# Patient Record
Sex: MALE | Race: ASIAN | NOT HISPANIC OR LATINO | URBAN - METROPOLITAN AREA
[De-identification: names, ages, dates, MRNs, and addresses within clinical notes are randomized per-mention and may not be internally consistent; named-entity substitution may affect disease eponyms.]

---

## 2018-03-13 ENCOUNTER — EMERGENCY (EMERGENCY)
Facility: HOSPITAL | Age: 38
LOS: 1 days | Discharge: HOME | End: 2018-03-13
Attending: EMERGENCY MEDICINE | Admitting: EMERGENCY MEDICINE
Payer: MEDICAID

## 2018-03-13 VITALS
SYSTOLIC BLOOD PRESSURE: 115 MMHG | DIASTOLIC BLOOD PRESSURE: 80 MMHG | RESPIRATION RATE: 18 BRPM | OXYGEN SATURATION: 100 % | HEART RATE: 64 BPM

## 2018-03-13 VITALS
DIASTOLIC BLOOD PRESSURE: 80 MMHG | HEART RATE: 68 BPM | TEMPERATURE: 99 F | SYSTOLIC BLOOD PRESSURE: 112 MMHG | OXYGEN SATURATION: 99 % | RESPIRATION RATE: 18 BRPM

## 2018-03-13 LAB
ALBUMIN SERPL ELPH-MCNC: 4.5 G/DL — SIGNIFICANT CHANGE UP (ref 3.3–5)
ALP SERPL-CCNC: 72 U/L — SIGNIFICANT CHANGE UP (ref 40–120)
ALT FLD-CCNC: 64 U/L — HIGH (ref 4–41)
AST SERPL-CCNC: 80 U/L — HIGH (ref 4–40)
BASOPHILS # BLD AUTO: 0.02 K/UL — SIGNIFICANT CHANGE UP (ref 0–0.2)
BASOPHILS NFR BLD AUTO: 0.8 % — SIGNIFICANT CHANGE UP (ref 0–2)
BASOPHILS NFR SPEC: 0 % — SIGNIFICANT CHANGE UP (ref 0–2)
BILIRUB SERPL-MCNC: 0.3 MG/DL — SIGNIFICANT CHANGE UP (ref 0.2–1.2)
BUN SERPL-MCNC: 8 MG/DL — SIGNIFICANT CHANGE UP (ref 7–23)
CALCIUM SERPL-MCNC: 8.8 MG/DL — SIGNIFICANT CHANGE UP (ref 8.4–10.5)
CARBAMAZEPINE SERPL-MCNC: 7.3 UG/ML — SIGNIFICANT CHANGE UP (ref 4–12)
CHLORIDE SERPL-SCNC: 100 MMOL/L — SIGNIFICANT CHANGE UP (ref 98–107)
CO2 SERPL-SCNC: 22 MMOL/L — SIGNIFICANT CHANGE UP (ref 22–31)
CREAT SERPL-MCNC: 0.88 MG/DL — SIGNIFICANT CHANGE UP (ref 0.5–1.3)
EOSINOPHIL # BLD AUTO: 0.07 K/UL — SIGNIFICANT CHANGE UP (ref 0–0.5)
EOSINOPHIL NFR BLD AUTO: 2.8 % — SIGNIFICANT CHANGE UP (ref 0–6)
EOSINOPHIL NFR FLD: 2.9 % — SIGNIFICANT CHANGE UP (ref 0–6)
GIANT PLATELETS BLD QL SMEAR: PRESENT — SIGNIFICANT CHANGE UP
GLUCOSE SERPL-MCNC: 89 MG/DL — SIGNIFICANT CHANGE UP (ref 70–99)
HCT VFR BLD CALC: 43.5 % — SIGNIFICANT CHANGE UP (ref 39–50)
HGB BLD-MCNC: 14.8 G/DL — SIGNIFICANT CHANGE UP (ref 13–17)
IMM GRANULOCYTES # BLD AUTO: 0.01 # — SIGNIFICANT CHANGE UP
IMM GRANULOCYTES NFR BLD AUTO: 0.4 % — SIGNIFICANT CHANGE UP (ref 0–1.5)
LYMPHOCYTES # BLD AUTO: 1.38 K/UL — SIGNIFICANT CHANGE UP (ref 1–3.3)
LYMPHOCYTES # BLD AUTO: 55.4 % — HIGH (ref 13–44)
LYMPHOCYTES NFR SPEC AUTO: 41 % — SIGNIFICANT CHANGE UP (ref 13–44)
MACROCYTES BLD QL: SIGNIFICANT CHANGE UP
MCHC RBC-ENTMCNC: 30.8 PG — SIGNIFICANT CHANGE UP (ref 27–34)
MCHC RBC-ENTMCNC: 34 % — SIGNIFICANT CHANGE UP (ref 32–36)
MCV RBC AUTO: 90.6 FL — SIGNIFICANT CHANGE UP (ref 80–100)
MICROCYTES BLD QL: SLIGHT — SIGNIFICANT CHANGE UP
MONOCYTES # BLD AUTO: 0.16 K/UL — SIGNIFICANT CHANGE UP (ref 0–0.9)
MONOCYTES NFR BLD AUTO: 6.4 % — SIGNIFICANT CHANGE UP (ref 2–14)
MONOCYTES NFR BLD: 4.8 % — SIGNIFICANT CHANGE UP (ref 2–9)
MYELOCYTES NFR BLD: 0.9 % — HIGH (ref 0–0)
NEUTROPHIL AB SER-ACNC: 29.5 % — LOW (ref 43–77)
NEUTROPHILS # BLD AUTO: 0.85 K/UL — LOW (ref 1.8–7.4)
NEUTROPHILS NFR BLD AUTO: 34.2 % — LOW (ref 43–77)
NRBC # FLD: 0 — SIGNIFICANT CHANGE UP
OVALOCYTES BLD QL SMEAR: SLIGHT — SIGNIFICANT CHANGE UP
PLATELET # BLD AUTO: 161 K/UL — SIGNIFICANT CHANGE UP (ref 150–400)
PLATELET COUNT - ESTIMATE: NORMAL — SIGNIFICANT CHANGE UP
PMV BLD: 11 FL — SIGNIFICANT CHANGE UP (ref 7–13)
POTASSIUM SERPL-MCNC: 3.8 MMOL/L — SIGNIFICANT CHANGE UP (ref 3.5–5.3)
POTASSIUM SERPL-SCNC: 3.8 MMOL/L — SIGNIFICANT CHANGE UP (ref 3.5–5.3)
PROT SERPL-MCNC: 8.6 G/DL — HIGH (ref 6–8.3)
RBC # BLD: 4.8 M/UL — SIGNIFICANT CHANGE UP (ref 4.2–5.8)
RBC # FLD: 11.7 % — SIGNIFICANT CHANGE UP (ref 10.3–14.5)
SODIUM SERPL-SCNC: 137 MMOL/L — SIGNIFICANT CHANGE UP (ref 135–145)
VARIANT LYMPHS # BLD: 20.9 % — SIGNIFICANT CHANGE UP
WBC # BLD: 2.49 K/UL — LOW (ref 3.8–10.5)
WBC # FLD AUTO: 2.49 K/UL — LOW (ref 3.8–10.5)

## 2018-03-13 PROCEDURE — 99285 EMERGENCY DEPT VISIT HI MDM: CPT

## 2018-03-13 PROCEDURE — 71046 X-RAY EXAM CHEST 2 VIEWS: CPT | Mod: 26

## 2018-03-13 RX ORDER — SODIUM CHLORIDE 9 MG/ML
1000 INJECTION INTRAMUSCULAR; INTRAVENOUS; SUBCUTANEOUS ONCE
Qty: 0 | Refills: 0 | Status: COMPLETED | OUTPATIENT
Start: 2018-03-13 | End: 2018-03-13

## 2018-03-13 RX ADMIN — SODIUM CHLORIDE 1000 MILLILITER(S): 9 INJECTION INTRAMUSCULAR; INTRAVENOUS; SUBCUTANEOUS at 14:04

## 2018-03-13 NOTE — PROVIDER CONTACT NOTE (OTHER) - ASSESSMENT
pt stable to return home.  [facility] called for transportation authorization.  auth#[id number].  mother at home to open the door.

## 2018-03-13 NOTE — ED PROVIDER NOTE - MEDICAL DECISION MAKING DETAILS
38 y/o M with history of seizures presents with 2 x seizure this AM with improving mental status. Awake and alert. Labs, monitor for further episodes, IVF, reassess 38 y/o M with history of seizures presents with 2 x seizure this AM with improving mental status. Awake and alert. neuro exam at baseline. Patient compliant with medications and seizure was typical of baseline seizures which he has every few months. Likely will not require admission. Labs, monitor for further episodes, IVF, reassess

## 2018-03-13 NOTE — ED PROVIDER NOTE - PROGRESS NOTE DETAILS
Franciscoh: Patient alert and awake. Following commands. Conversive. Back at baseline mental status. To be discharged. Jaki: Patient alert and awake. Following commands. Conversive. Back at baseline mental status. Mother now at home and requesting ambulance to take patient home. Advised to stop taking levaquin. To be discharged.

## 2018-03-13 NOTE — ED PROVIDER NOTE - ATTENDING CONTRIBUTION TO CARE
DR. GARCIA, ATTENDING MD-  I performed a face to face bedside interview with patient regarding history of present illness, review of symptoms and past medical history. I completed an independent physical exam.  I have discussed patient's plan of care with the resident.   Documentation as above in the note.    38 y/o male with h/o tbi, epilepsy on topiramate, carbamazepine bib mother after seizures x2 today.  Confused from baseline on waking.  Seizures broke without intervention per mother.  Recently started on flouroquinolone for cough by pcp this week.  Typically seizures q3 months.  Afebrile, vs wnl, nad, alert and oriented to person only, poor insp effort but otherwise ctabil, s1s2 rrr no m/r/g, abd soft non ttp no r/g, no cva tenderness b/l, no leg swelling b/l, no rash, right side of body chronically contracted.  Per mother, rapidly improving ms, not quite at baseline.  Seizure may be secondary to apparent recent resp infection vs new abx.  Obtain cbc, bmp, cxr, continue to monitor for return to baseline, antic dc with pcp f/u.

## 2018-03-13 NOTE — ED PROVIDER NOTE - PHYSICAL EXAMINATION
Neuro: Patient has paralysis of RUE and RLE from previous TBI. 5/5 strength on L side and following commands.
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
## 2018-03-13 NOTE — ED PROVIDER NOTE - OBJECTIVE STATEMENT
38 y/o M with history of TBI, seizures on topiramate 200 mg BID and carbamazepine 200 mg BID BIBEMS for seizures x 2 with last episode one hour ago. Patient's mental status improved but not completely back at baseline. Seizures were typical of previous episodes. Last seizure 5 months ago and patient usually has a seizure every 3-4 months. Mother reports compliance with medications. Had recent URI symptoms and was started on levaquin by his PMD which he has been taking.   History obtained using Kazakh  #[id number] with mother.  PMD Dr. Marito Ramos

## 2018-03-13 NOTE — ED ADULT NURSE NOTE - OBJECTIVE STATEMENT
Patient received to [place], mother at bedside, patient responsive to name, as per mom patient's mental status improved but not back to baseline, baseline bilateral extremity weakness ambulates with cane. Patient experienced seizures x 2 today. Compliant with medications topiramate and carbamazepine. Currently being treated for + flu as per mom. Hx TBI, seizures. Last fall 1 month ago, no head injuries or LOC. Left hand 22g IV placed. Patient received to Tr-KRISTY, mother at bedside, patient responsive to name, as per mom patient's mental status improved but not back to baseline, baseline bilateral extremity weakness ambulates with cane, skin intact. Patient experienced seizures x 2 today. Compliant with medications topiramate and carbamazepine. Currently being treated for + flu as per mom. Hx TBI, seizures. Last fall 1 month ago, no head injuries or LOC. Left hand 22g IV placed.

## 2018-03-15 LAB — TOPIRAMATE SERPL-MCNC: 11 MCG/ML — SIGNIFICANT CHANGE UP

## 2018-04-20 ENCOUNTER — EMERGENCY (EMERGENCY)
Facility: HOSPITAL | Age: 38
LOS: 1 days | Discharge: HOME | End: 2018-04-20
Attending: EMERGENCY MEDICINE | Admitting: EMERGENCY MEDICINE
Payer: MEDICAID

## 2018-04-20 VITALS
DIASTOLIC BLOOD PRESSURE: 79 MMHG | SYSTOLIC BLOOD PRESSURE: 109 MMHG | RESPIRATION RATE: 16 BRPM | OXYGEN SATURATION: 100 % | TEMPERATURE: 98 F | HEART RATE: 73 BPM

## 2018-04-20 VITALS
SYSTOLIC BLOOD PRESSURE: 118 MMHG | HEART RATE: 64 BPM | RESPIRATION RATE: 18 BRPM | DIASTOLIC BLOOD PRESSURE: 81 MMHG | OXYGEN SATURATION: 99 %

## 2018-04-20 LAB
ALBUMIN SERPL ELPH-MCNC: 4.3 G/DL — SIGNIFICANT CHANGE UP (ref 3.3–5)
ALP SERPL-CCNC: 68 U/L — SIGNIFICANT CHANGE UP (ref 40–120)
ALT FLD-CCNC: 36 U/L — SIGNIFICANT CHANGE UP (ref 4–41)
APPEARANCE UR: SIGNIFICANT CHANGE UP
AST SERPL-CCNC: 39 U/L — SIGNIFICANT CHANGE UP (ref 4–40)
BASOPHILS # BLD AUTO: 0.03 K/UL — SIGNIFICANT CHANGE UP (ref 0–0.2)
BASOPHILS NFR BLD AUTO: 0.7 % — SIGNIFICANT CHANGE UP (ref 0–2)
BILIRUB SERPL-MCNC: 0.2 MG/DL — SIGNIFICANT CHANGE UP (ref 0.2–1.2)
BILIRUB UR-MCNC: NEGATIVE — SIGNIFICANT CHANGE UP
BLOOD UR QL VISUAL: NEGATIVE — SIGNIFICANT CHANGE UP
BUN SERPL-MCNC: 8 MG/DL — SIGNIFICANT CHANGE UP (ref 7–23)
CALCIUM SERPL-MCNC: 8.7 MG/DL — SIGNIFICANT CHANGE UP (ref 8.4–10.5)
CHLORIDE SERPL-SCNC: 98 MMOL/L — SIGNIFICANT CHANGE UP (ref 98–107)
CK MB BLD-MCNC: 14.11 NG/ML — HIGH (ref 1–6.6)
CK MB BLD-MCNC: 7.5 — HIGH (ref 0–2.5)
CK SERPL-CCNC: 187 U/L — SIGNIFICANT CHANGE UP (ref 30–200)
CO2 SERPL-SCNC: 22 MMOL/L — SIGNIFICANT CHANGE UP (ref 22–31)
COLOR SPEC: SIGNIFICANT CHANGE UP
CREAT SERPL-MCNC: 0.76 MG/DL — SIGNIFICANT CHANGE UP (ref 0.5–1.3)
EOSINOPHIL # BLD AUTO: 0.05 K/UL — SIGNIFICANT CHANGE UP (ref 0–0.5)
EOSINOPHIL NFR BLD AUTO: 1.2 % — SIGNIFICANT CHANGE UP (ref 0–6)
GLUCOSE SERPL-MCNC: 103 MG/DL — HIGH (ref 70–99)
GLUCOSE UR-MCNC: NEGATIVE — SIGNIFICANT CHANGE UP
HCT VFR BLD CALC: 37.5 % — LOW (ref 39–50)
HGB BLD-MCNC: 12.9 G/DL — LOW (ref 13–17)
IMM GRANULOCYTES # BLD AUTO: 0.01 # — SIGNIFICANT CHANGE UP
IMM GRANULOCYTES NFR BLD AUTO: 0.2 % — SIGNIFICANT CHANGE UP (ref 0–1.5)
KETONES UR-MCNC: NEGATIVE — SIGNIFICANT CHANGE UP
LEUKOCYTE ESTERASE UR-ACNC: NEGATIVE — SIGNIFICANT CHANGE UP
LYMPHOCYTES # BLD AUTO: 2.04 K/UL — SIGNIFICANT CHANGE UP (ref 1–3.3)
LYMPHOCYTES # BLD AUTO: 48.5 % — HIGH (ref 13–44)
MCHC RBC-ENTMCNC: 31.9 PG — SIGNIFICANT CHANGE UP (ref 27–34)
MCHC RBC-ENTMCNC: 34.4 % — SIGNIFICANT CHANGE UP (ref 32–36)
MCV RBC AUTO: 92.6 FL — SIGNIFICANT CHANGE UP (ref 80–100)
MONOCYTES # BLD AUTO: 0.28 K/UL — SIGNIFICANT CHANGE UP (ref 0–0.9)
MONOCYTES NFR BLD AUTO: 6.7 % — SIGNIFICANT CHANGE UP (ref 2–14)
MUCOUS THREADS # UR AUTO: SIGNIFICANT CHANGE UP
NEUTROPHILS # BLD AUTO: 1.8 K/UL — SIGNIFICANT CHANGE UP (ref 1.8–7.4)
NEUTROPHILS NFR BLD AUTO: 42.7 % — LOW (ref 43–77)
NITRITE UR-MCNC: NEGATIVE — SIGNIFICANT CHANGE UP
NRBC # FLD: 0 — SIGNIFICANT CHANGE UP
PH UR: 7.5 — SIGNIFICANT CHANGE UP (ref 4.6–8)
PLATELET # BLD AUTO: 202 K/UL — SIGNIFICANT CHANGE UP (ref 150–400)
PMV BLD: 11.1 FL — SIGNIFICANT CHANGE UP (ref 7–13)
POTASSIUM SERPL-MCNC: 3.5 MMOL/L — SIGNIFICANT CHANGE UP (ref 3.5–5.3)
POTASSIUM SERPL-SCNC: 3.5 MMOL/L — SIGNIFICANT CHANGE UP (ref 3.5–5.3)
PROT SERPL-MCNC: 7.7 G/DL — SIGNIFICANT CHANGE UP (ref 6–8.3)
PROT UR-MCNC: NEGATIVE MG/DL — SIGNIFICANT CHANGE UP
RBC # BLD: 4.05 M/UL — LOW (ref 4.2–5.8)
RBC # FLD: 11.8 % — SIGNIFICANT CHANGE UP (ref 10.3–14.5)
SODIUM SERPL-SCNC: 134 MMOL/L — LOW (ref 135–145)
SP GR SPEC: 1.01 — SIGNIFICANT CHANGE UP (ref 1–1.04)
TROPONIN T SERPL-MCNC: < 0.06 NG/ML — SIGNIFICANT CHANGE UP (ref 0–0.06)
TROPONIN T SERPL-MCNC: < 0.06 NG/ML — SIGNIFICANT CHANGE UP (ref 0–0.06)
UROBILINOGEN FLD QL: NORMAL MG/DL — SIGNIFICANT CHANGE UP
WBC # BLD: 4.21 K/UL — SIGNIFICANT CHANGE UP (ref 3.8–10.5)
WBC # FLD AUTO: 4.21 K/UL — SIGNIFICANT CHANGE UP (ref 3.8–10.5)
WBC UR QL: SIGNIFICANT CHANGE UP (ref 0–?)

## 2018-04-20 PROCEDURE — 99285 EMERGENCY DEPT VISIT HI MDM: CPT

## 2018-04-20 PROCEDURE — 71045 X-RAY EXAM CHEST 1 VIEW: CPT | Mod: 26

## 2018-04-20 NOTE — ED ADULT NURSE REASSESSMENT NOTE - NS ED NURSE REASSESS COMMENT FT1
ER pt coming by EMS from home for left side CP with Hx. stroke with right side hemiparesis, skin intact, lungs clear, 98-99% RA on CM NSR resp. equal 18-20b/min  IV to left AC 20g angio cath. pt uses urinal UA sent. lab sent pending dispo.      Connie Seymour RN

## 2018-04-20 NOTE — ED PROVIDER NOTE - PLAN OF CARE
Take medications as prescribed. Follow up with your healthcare provider about this issue (these issues): chest pain. May benefit from stress test. Return if chest pain returns, especially if associated with fever, dizziness, sweating, nausea, vomiting, or shortness of breath.

## 2018-04-20 NOTE — ED PROVIDER NOTE - ATTENDING CONTRIBUTION TO CARE
I performed a face-to-face evaluation of the patient and performed a history and physical examination. I agree with the history and physical examination.    Isabell: 37 M, h/o TBI and sz., p/w CP and L arm pain. No infectious Sx. No VTE risks (walks w/ cane). Appears well. NAD. Afebrile. Vital signs unremarkable. Strong pulse. Respirations unlabored. No LE edema. HEART score low. PERC neg. Check EKG, trop, CXR. Likely Discharge home.

## 2018-04-20 NOTE — ED ADULT TRIAGE NOTE - CHIEF COMPLAINT QUOTE
Pt BIBA primarily Estonian speaking c/o Lt sided chest pain radiating to Lt arm starting yesterday, describes pain as squeezing, denies sob breathing even and unlabored. PMH: TBI(rt arm contracted), HTN Pt BIBA primarily Italian speaking coming from home, c/o Lt sided chest pain radiating to Lt arm starting yesterday, describes pain as squeezing, denies sob breathing even and unlabored. PMH: TBI(rt arm contracted), HTN

## 2018-04-20 NOTE — ED PROVIDER NOTE - OBJECTIVE STATEMENT
Chace  [id number]: 37 M h/o seizures, p/w L sided chest pain to L arm, Chace  [id number]: 37 M h/o seizures, TBI with contractures, p/w L sided chest pain to L arm, that started last night while sleeping. No associated SOB/diaphoresis/nausea/vomiting. No pleuritic pain. No cough. Receviecd 162 asa from ems. Pain improving. No recent falls. Non smoker. No h/o clots. Ambulates with walker.

## 2018-04-20 NOTE — ED ADULT NURSE NOTE - CHIEF COMPLAINT QUOTE
Pt BIBA primarily Telugu speaking c/o Lt sided chest pain radiating to Lt arm starting yesterday, describes pain as squeezing, denies sob breathing even and unlabored. PMH: TBI(rt arm contracted), HTN

## 2018-04-20 NOTE — ED PROVIDER NOTE - MEDICAL DECISION MAKING DETAILS
Isabell: 37 M, h/o TBI and sz., p/w CP and L arm pain. No infectious Sx. No VTE risks (walks w/ cane). Appears well. NAD. Afebrile. Vital signs unremarkable. Strong pulse. Respirations unlabored. No LE edema. HEART score low. PERC neg. Check EKG, trop, CXR. Likely Discharge home.

## 2018-04-20 NOTE — ED PROVIDER NOTE - CARE PLAN
Principal Discharge DX:	Chest pain Principal Discharge DX:	Chest pain  Assessment and plan of treatment:	Take medications as prescribed. Follow up with your healthcare provider about this issue (these issues): chest pain. May benefit from stress test. Return if chest pain returns, especially if associated with fever, dizziness, sweating, nausea, vomiting, or shortness of breath.

## 2018-11-01 ENCOUNTER — OUTPATIENT (OUTPATIENT)
Dept: OUTPATIENT SERVICES | Facility: HOSPITAL | Age: 38
LOS: 1 days | End: 2018-11-01
Payer: MEDICAID

## 2018-11-03 ENCOUNTER — INPATIENT (INPATIENT)
Facility: HOSPITAL | Age: 38
LOS: 4 days | Discharge: HOME HEALTH | End: 2018-11-08
Attending: HOSPITALIST | Admitting: HOSPITALIST
Payer: MEDICAID

## 2018-11-03 VITALS
RESPIRATION RATE: 18 BRPM | HEART RATE: 84 BPM | DIASTOLIC BLOOD PRESSURE: 61 MMHG | SYSTOLIC BLOOD PRESSURE: 145 MMHG | TEMPERATURE: 99 F | OXYGEN SATURATION: 100 %
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
## 2018-11-03 DIAGNOSIS — E87.1 HYPO-OSMOLALITY AND HYPONATREMIA: ICD-10-CM

## 2018-11-03 LAB
ALBUMIN SERPL ELPH-MCNC: 4.3 G/DL — SIGNIFICANT CHANGE UP (ref 3.3–5)
ALP SERPL-CCNC: 72 U/L — SIGNIFICANT CHANGE UP (ref 40–120)
ALT FLD-CCNC: 19 U/L — SIGNIFICANT CHANGE UP (ref 4–41)
APPEARANCE UR: SIGNIFICANT CHANGE UP
AST SERPL-CCNC: 37 U/L — SIGNIFICANT CHANGE UP (ref 4–40)
BACTERIA # UR AUTO: NEGATIVE — SIGNIFICANT CHANGE UP
BASOPHILS # BLD AUTO: 0.03 K/UL — SIGNIFICANT CHANGE UP (ref 0–0.2)
BASOPHILS NFR BLD AUTO: 0.5 % — SIGNIFICANT CHANGE UP (ref 0–2)
BILIRUB SERPL-MCNC: 0.4 MG/DL — SIGNIFICANT CHANGE UP (ref 0.2–1.2)
BILIRUB UR-MCNC: NEGATIVE — SIGNIFICANT CHANGE UP
BLOOD UR QL VISUAL: NEGATIVE — SIGNIFICANT CHANGE UP
BUN SERPL-MCNC: 7 MG/DL — SIGNIFICANT CHANGE UP (ref 7–23)
BUN SERPL-MCNC: 7 MG/DL — SIGNIFICANT CHANGE UP (ref 7–23)
BUN SERPL-MCNC: 9 MG/DL — SIGNIFICANT CHANGE UP (ref 7–23)
CALCIUM SERPL-MCNC: 8.1 MG/DL — LOW (ref 8.4–10.5)
CALCIUM SERPL-MCNC: 8.4 MG/DL — SIGNIFICANT CHANGE UP (ref 8.4–10.5)
CALCIUM SERPL-MCNC: 8.8 MG/DL — SIGNIFICANT CHANGE UP (ref 8.4–10.5)
CARBAMAZEPINE SERPL-MCNC: 8.8 UG/ML — SIGNIFICANT CHANGE UP (ref 4–12)
CHLORIDE SERPL-SCNC: 102 MMOL/L — SIGNIFICANT CHANGE UP (ref 98–107)
CHLORIDE SERPL-SCNC: 103 MMOL/L — SIGNIFICANT CHANGE UP (ref 98–107)
CHLORIDE SERPL-SCNC: 94 MMOL/L — LOW (ref 98–107)
CO2 SERPL-SCNC: 16 MMOL/L — LOW (ref 22–31)
CO2 SERPL-SCNC: 16 MMOL/L — LOW (ref 22–31)
CO2 SERPL-SCNC: 19 MMOL/L — LOW (ref 22–31)
COLOR SPEC: YELLOW — SIGNIFICANT CHANGE UP
CREAT SERPL-MCNC: 0.68 MG/DL — SIGNIFICANT CHANGE UP (ref 0.5–1.3)
CREAT SERPL-MCNC: 0.69 MG/DL — SIGNIFICANT CHANGE UP (ref 0.5–1.3)
CREAT SERPL-MCNC: 0.79 MG/DL — SIGNIFICANT CHANGE UP (ref 0.5–1.3)
EOSINOPHIL # BLD AUTO: 0.02 K/UL — SIGNIFICANT CHANGE UP (ref 0–0.5)
EOSINOPHIL NFR BLD AUTO: 0.3 % — SIGNIFICANT CHANGE UP (ref 0–6)
GLUCOSE SERPL-MCNC: 101 MG/DL — HIGH (ref 70–99)
GLUCOSE SERPL-MCNC: 118 MG/DL — HIGH (ref 70–99)
GLUCOSE SERPL-MCNC: 93 MG/DL — SIGNIFICANT CHANGE UP (ref 70–99)
GLUCOSE UR-MCNC: NEGATIVE — SIGNIFICANT CHANGE UP
HCT VFR BLD CALC: 38.1 % — LOW (ref 39–50)
HGB BLD-MCNC: 13.5 G/DL — SIGNIFICANT CHANGE UP (ref 13–17)
HYALINE CASTS # UR AUTO: NEGATIVE — SIGNIFICANT CHANGE UP
IMM GRANULOCYTES # BLD AUTO: 0.06 # — SIGNIFICANT CHANGE UP
IMM GRANULOCYTES NFR BLD AUTO: 1 % — SIGNIFICANT CHANGE UP (ref 0–1.5)
KETONES UR-MCNC: NEGATIVE — SIGNIFICANT CHANGE UP
LEUKOCYTE ESTERASE UR-ACNC: NEGATIVE — SIGNIFICANT CHANGE UP
LYMPHOCYTES # BLD AUTO: 1.28 K/UL — SIGNIFICANT CHANGE UP (ref 1–3.3)
LYMPHOCYTES # BLD AUTO: 22.4 % — SIGNIFICANT CHANGE UP (ref 13–44)
MCHC RBC-ENTMCNC: 32.5 PG — SIGNIFICANT CHANGE UP (ref 27–34)
MCHC RBC-ENTMCNC: 35.4 % — SIGNIFICANT CHANGE UP (ref 32–36)
MCV RBC AUTO: 91.6 FL — SIGNIFICANT CHANGE UP (ref 80–100)
MONOCYTES # BLD AUTO: 0.35 K/UL — SIGNIFICANT CHANGE UP (ref 0–0.9)
MONOCYTES NFR BLD AUTO: 6.1 % — SIGNIFICANT CHANGE UP (ref 2–14)
NEUTROPHILS # BLD AUTO: 3.98 K/UL — SIGNIFICANT CHANGE UP (ref 1.8–7.4)
NEUTROPHILS NFR BLD AUTO: 69.7 % — SIGNIFICANT CHANGE UP (ref 43–77)
NITRITE UR-MCNC: NEGATIVE — SIGNIFICANT CHANGE UP
NRBC # FLD: 0 — SIGNIFICANT CHANGE UP
PH UR: 7.5 — SIGNIFICANT CHANGE UP (ref 5–8)
PLATELET # BLD AUTO: 186 K/UL — SIGNIFICANT CHANGE UP (ref 150–400)
PMV BLD: 10.8 FL — SIGNIFICANT CHANGE UP (ref 7–13)
POTASSIUM SERPL-MCNC: 3 MMOL/L — LOW (ref 3.5–5.3)
POTASSIUM SERPL-MCNC: 3.5 MMOL/L — SIGNIFICANT CHANGE UP (ref 3.5–5.3)
POTASSIUM SERPL-MCNC: 4.8 MMOL/L — SIGNIFICANT CHANGE UP (ref 3.5–5.3)
POTASSIUM SERPL-SCNC: 3 MMOL/L — LOW (ref 3.5–5.3)
POTASSIUM SERPL-SCNC: 3.5 MMOL/L — SIGNIFICANT CHANGE UP (ref 3.5–5.3)
POTASSIUM SERPL-SCNC: 4.8 MMOL/L — SIGNIFICANT CHANGE UP (ref 3.5–5.3)
PROT SERPL-MCNC: 8.3 G/DL — SIGNIFICANT CHANGE UP (ref 6–8.3)
PROT UR-MCNC: SIGNIFICANT CHANGE UP
RBC # BLD: 4.16 M/UL — LOW (ref 4.2–5.8)
RBC # FLD: 11.2 % — SIGNIFICANT CHANGE UP (ref 10.3–14.5)
RBC CASTS # UR COMP ASSIST: SIGNIFICANT CHANGE UP (ref 0–?)
SODIUM SERPL-SCNC: 126 MMOL/L — LOW (ref 135–145)
SODIUM SERPL-SCNC: 129 MMOL/L — LOW (ref 135–145)
SODIUM SERPL-SCNC: 130 MMOL/L — LOW (ref 135–145)
SP GR SPEC: 1.02 — SIGNIFICANT CHANGE UP (ref 1–1.04)
SQUAMOUS # UR AUTO: SIGNIFICANT CHANGE UP
UROBILINOGEN FLD QL: NORMAL — SIGNIFICANT CHANGE UP
WBC # BLD: 5.72 K/UL — SIGNIFICANT CHANGE UP (ref 3.8–10.5)
WBC # FLD AUTO: 5.72 K/UL — SIGNIFICANT CHANGE UP (ref 3.8–10.5)
WBC UR QL: SIGNIFICANT CHANGE UP (ref 0–?)

## 2018-11-03 PROCEDURE — 70450 CT HEAD/BRAIN W/O DYE: CPT | Mod: 26

## 2018-11-03 PROCEDURE — 71046 X-RAY EXAM CHEST 2 VIEWS: CPT | Mod: 26

## 2018-11-03 RX ORDER — TETANUS TOXOID, REDUCED DIPHTHERIA TOXOID AND ACELLULAR PERTUSSIS VACCINE, ADSORBED 5; 2.5; 8; 8; 2.5 [IU]/.5ML; [IU]/.5ML; UG/.5ML; UG/.5ML; UG/.5ML
0.5 SUSPENSION INTRAMUSCULAR ONCE
Qty: 0 | Refills: 0 | Status: COMPLETED | OUTPATIENT
Start: 2018-11-03 | End: 2018-11-03

## 2018-11-03 RX ORDER — CARBAMAZEPINE 200 MG
400 TABLET ORAL DAILY
Qty: 0 | Refills: 0 | Status: DISCONTINUED | OUTPATIENT
Start: 2018-11-03 | End: 2018-11-04

## 2018-11-03 RX ORDER — CARBAMAZEPINE 200 MG
200 TABLET ORAL AT BEDTIME
Qty: 0 | Refills: 0 | Status: DISCONTINUED | OUTPATIENT
Start: 2018-11-03 | End: 2018-11-04

## 2018-11-03 RX ORDER — TOPIRAMATE 25 MG
200 TABLET ORAL EVERY 12 HOURS
Qty: 0 | Refills: 0 | Status: DISCONTINUED | OUTPATIENT
Start: 2018-11-03 | End: 2018-11-08

## 2018-11-03 RX ORDER — POTASSIUM CHLORIDE 20 MEQ
20 PACKET (EA) ORAL
Qty: 0 | Refills: 0 | Status: COMPLETED | OUTPATIENT
Start: 2018-11-03 | End: 2018-11-04

## 2018-11-03 RX ORDER — ENOXAPARIN SODIUM 100 MG/ML
40 INJECTION SUBCUTANEOUS EVERY 24 HOURS
Qty: 0 | Refills: 0 | Status: DISCONTINUED | OUTPATIENT
Start: 2018-11-03 | End: 2018-11-08

## 2018-11-03 RX ORDER — SODIUM CHLORIDE 9 MG/ML
1000 INJECTION INTRAMUSCULAR; INTRAVENOUS; SUBCUTANEOUS ONCE
Qty: 0 | Refills: 0 | Status: COMPLETED | OUTPATIENT
Start: 2018-11-03 | End: 2018-11-03

## 2018-11-03 RX ORDER — ATORVASTATIN CALCIUM 80 MG/1
20 TABLET, FILM COATED ORAL AT BEDTIME
Qty: 0 | Refills: 0 | Status: DISCONTINUED | OUTPATIENT
Start: 2018-11-03 | End: 2018-11-08

## 2018-11-03 RX ADMIN — TETANUS TOXOID, REDUCED DIPHTHERIA TOXOID AND ACELLULAR PERTUSSIS VACCINE, ADSORBED 0.5 MILLILITER(S): 5; 2.5; 8; 8; 2.5 SUSPENSION INTRAMUSCULAR at 14:21

## 2018-11-03 RX ADMIN — SODIUM CHLORIDE 1000 MILLILITER(S): 9 INJECTION INTRAMUSCULAR; INTRAVENOUS; SUBCUTANEOUS at 14:19

## 2018-11-03 RX ADMIN — ENOXAPARIN SODIUM 40 MILLIGRAM(S): 100 INJECTION SUBCUTANEOUS at 23:30

## 2018-11-03 NOTE — CONSULT NOTE ADULT - ATTENDING COMMENTS
Patient was seen and examined.  He offers little insight deferring to his mother.  The patient has distinct metabolic abnormalities hyponatremia and acidemia which could be attributed to carbamazepine and topiramate respectively. Patient was seen and examined.  He offers little insight deferring to his mother.  The patient has distinct metabolic abnormalities hyponatremia and acidemia which could be attributed to carbamazepine and topiramate respectively.  This being said when reviewing charts back to March of 2018 these metabolic derangements were not present on the same medications.    -Medical work up and treatment for hyponatremia/acidemia  - If no resolution/explaination in 24-48 hours consider cause to be iatrogenic and we will need to consider AED change (which we can consider performing on VEEG)

## 2018-11-03 NOTE — ED PROVIDER NOTE - OBJECTIVE STATEMENT
36 yo male c pmhx TBI as a child with right sided weakness, seizures BIBA for seizures at home.  Pt nonverbal, called mother to obtain history.  As per mother states pt is "not verbal much" at baseline however  work up this AM looking "unwell" and pt was c/o dizziness.  At around 8-9am mother states patient fell in the "washroom" hitting the right side of his head.  No LOC was reported.  Pt then had a seizure at around 10 am as per mother for about "5-6 min" and then a second seizure at 12 pm.   EMS was then called.   No fever, vomiting reported.
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
## 2018-11-03 NOTE — ED PROVIDER NOTE - MEDICAL DECISION MAKING DETAILS
36 yo male c pmhx TBI as a child with right sided weakness, seizures BIBA for seizures at home.   Pt nonverbal, as per mother pt felt dizzy-->fell with head injury-->2 episodes of seizures; will check labs, ua, ekg, cxr, ct head

## 2018-11-03 NOTE — H&P ADULT - PROBLEM SELECTOR PLAN 2
- Unclear etiology given lack of clinical history. Previous record with fall at home from seizure activities. (similar to this admission).  - Continue to monitor while inpatient.  - Fall risk ordered.   - CTH negative. If worsening neuro status, will repeat imaging.

## 2018-11-03 NOTE — H&P ADULT - NSHPLABSRESULTS_GEN_ALL_CORE
( @ 12:50)                      13.5  5.72 )-----------( 186                 38.1    Neutrophils = 3.98 (69.7%)  Lymphocytes = 1.28 (22.4%)  Eosinophils = 0.02 (0.3%)  Basophils = 0.03 (0.5%)  Monocytes = 0.35 (6.1%)  Bands = --%        129<L>  |  102  |  7   ----------------------------<  93  3.5   |  16<L>  |  0.69    Ca    8.1<L>      2018 17:00    TPro  8.3  /  Alb  4.3  /  TBili  0.4  /  DBili  x   /  AST  37  /  ALT  19  /  AlkPhos  72  11    Urinalysis Basic - ( 2018 13:37 )    Color: YELLOW / Appearance: HAZY / S.018 / pH: 7.5  Gluc: NEGATIVE / Ketone: NEGATIVE  / Bili: NEGATIVE / Urobili: NORMAL   Blood: NEGATIVE / Protein: TRACE / Nitrite: NEGATIVE   Leuk Esterase: NEGATIVE / RBC: 0-2 / WBC 0-2   Sq Epi: OCC / Non Sq Epi: x / Bacteria: NEGATIVE    CTH: negative for acute findings.

## 2018-11-03 NOTE — ED PROVIDER NOTE - ATTENDING CONTRIBUTION TO CARE
Dr. Abebe: I performed a face to face bedside interview with patient regarding history of present illness, review of symptoms and past medical history. I completed an independent physical exam.  I have discussed patient's plan of care with PA.   I agree with note as stated above, having amended the EMR as needed to reflect my findings.   This includes HISTORY OF PRESENT ILLNESS, HIV, PAST MEDICAL/SURGICAL/FAMILY/SOCIAL HISTORY, ALLERGIES AND HOME MEDICATIONS, REVIEW OF SYSTEMS, PHYSICAL EXAM, and any PROGRESS NOTES during the time I functioned as the attending physician for this patient.    Pt with PMH of seizures, on topiramate and carbamazepine, TBI, chronic R arm/leg paralysis BIBA with likely seizure and head  injury. Pt postictal currently and can't provide much hx. No family at beside to provide hx.    On exam with small abrasion to R forehead  PERRLA  EOMI  no midline C spine tenderness, neck supple  A&Ox1, following some commands  R arm paralysis    likely post-ictal state. will r/o ICH, underlying infection, electrolyte abnormality, subtherapeutic meds. Plan for labs, UA, CXR, CT head, FS, re-eval. Dr. Abebe: I performed a face to face bedside interview with patient regarding history of present illness, review of symptoms and past medical history. I completed an independent physical exam.  I have discussed patient's plan of care with PA.   I agree with note as stated above, having amended the EMR as needed to reflect my findings.   This includes HISTORY OF PRESENT ILLNESS, HIV, PAST MEDICAL/SURGICAL/FAMILY/SOCIAL HISTORY, ALLERGIES AND HOME MEDICATIONS, REVIEW OF SYSTEMS, PHYSICAL EXAM, and any PROGRESS NOTES during the time I functioned as the attending physician for this patient.    Pt with PMH of seizures, on topiramate and carbamazepine, TBI, chronic R arm/leg paralysis BIBA with likely seizure and head  injury. Pt postictal currently and can't provide much hx. No family at beside to provide hx.    On exam with small abrasion to R forehead  PERRLA  EOMI  no midline C spine tenderness, neck supple  A&Ox1, following some commands  R arm paralysis, R leg unable to move. 5/5 strength in L extremities.    likely post-ictal state. will r/o ICH, underlying infection, electrolyte abnormality, subtherapeutic meds. Plan for labs, UA, CXR, CT head, FS, re-eval.

## 2018-11-03 NOTE — ED PROVIDER NOTE - PROGRESS NOTE DETAILS
Mis: Pt is now A&Ox2, appears more awake. Will attempt to provide urine sample. Mother reports that is waiting for a ride to get to ED.  Also with hyponatremia, likely due to dehydration, will give IVFs. Mis: pt to get straight cathed for urine, will repeat BMP to check NA.  Awaiting arrival of family to confirm that mental status is at baseline for pt.  Pending CT read still. Mis: Mother reports that unable to get to ED for at least next 2 hours. Will get neuro involved in evaluation for possible med adjustment. Pt signed out to  pending CT head read, UA, repeat BMP, neuro c/s.

## 2018-11-03 NOTE — H&P ADULT - PROBLEM SELECTOR PLAN 1
Likely due to recent mild head trauma due to a fall;   -CT head unchanged  -Therapeutic Carbamazepine level   -Neuro consulted from ED. Will f/u recs.  -Frequent neuro status check

## 2018-11-03 NOTE — ED ADULT TRIAGE NOTE - CHIEF COMPLAINT QUOTE
BIBEMS from home for seizure 3 hours ago. Hx: TBI, seizures. Family gave pt his seizure meds while EMS was there. Family poor historians, told EMS that pt is selectively verbal. Pt states name and c/o dizziness.

## 2018-11-03 NOTE — ED PROVIDER NOTE - SKIN WOUND TYPE
right sided abrasion noted to right temporal region, no active bleeding, no swelling or hematoma noted.

## 2018-11-03 NOTE — CONSULT NOTE ADULT - SUBJECTIVE AND OBJECTIVE BOX
Neurology  Consult Note  11-03-18    Name:  TASIA WARE; 37y (1980)    Reason for Admission:   Hyponatremia, hypo-osmolarity, or hypo-osmolar hyponatremia    Chief Complaint:  Seizure activity.     HPI:  38 y/o male, total care dependent, with h/o TBI at the age of 4, epilepsy at the age of 13, multiple hospitalizations 2/2 seizures presenting with seizure and fall at home today. Patient baseline minimally verbal. History limited as patient is poorly verbal and no family present at bedside in ED. Per ED notes and previous record, at baseline the pt has 1 seizure every 3-4 months. Patient woke up this AM looking "unwell" and pt was c/o dizziness.  At around 8-9am patient fell in the "washroom" hitting the right side of his head. No LOC was noticed. Pt then had a seizure at around 10 am as per mother for about "5-6 min" and then a second seizure at 12 pm. EMS was called subsequently. Otherwise, patient does not have fever, chills, cp, sob at home.    ED course: VSSAF 98.3, HR 68, 124/84, 100%RA. Patient received IVF 1L and tetanus shot in the ED. CTH shows NAD with major chronic changes.     Review of Systems:  Unable to assess.    Allergies:  penicillin (Unknown)    PMHx:   Arthritis  High cholesterol  Head injury  Cerebral Seizure    PFHx:   Family history unknown    PSuHx:   No significant past surgical history    Medications:  MEDICATIONS  (STANDING):  enoxaparin Injectable 40 milliGRAM(s) SubCutaneous every 24 hours    MEDICATIONS  (PRN):    Vitals:  T(C): 36.8 (11-03-18 @ 20:28), Max: 37.1 (11-03-18 @ 12:04)  HR: 68 (11-03-18 @ 20:28) (66 - 84)  BP: 124/84 (11-03-18 @ 20:28) (122/80 - 145/61)  RR: 18 (11-03-18 @ 20:28) (16 - 18)  SpO2: 100% (11-03-18 @ 20:28) (100% - 100%)    Labs:                        13.5   5.72  )-----------( 186      ( 03 Nov 2018 12:50 )             38.1     11-03    129<L>  |  102  |  7   ----------------------------<  93  3.5   |  16<L>  |  0.69    Ca    8.1<L>      03 Nov 2018 17:00    TPro  8.3  /  Alb  4.3  /  TBili  0.4  /  DBili  x   /  AST  37  /  ALT  19  /  AlkPhos  72  11-03    CAPILLARY BLOOD GLUCOSE  LIVER FUNCTIONS - ( 03 Nov 2018 12:50 )  Alb: 4.3 g/dL / Pro: 8.3 g/dL / ALK PHOS: 72 u/L / ALT: 19 u/L / AST: 37 u/L / GGT: x           PHYSICAL EXAMINATION:  General: Well-developed, well nourished, in no acute distress.  Eyes: Conjunctiva and sclera clear.  Neurologic:  - Mental Status:  Oriented to person only. Unable to communicate in full sentences. Poor fund of knowledge.   - Cranial Nerves II-XII:    II:  Visual fields are full to confrontation; Pupils are equal, round, and reactive to light;   III, IV, VI:  Extraocular movements are intact without nystagmus.  V: Unable to assess.  VII:  Face is symmetric with normal eye closure and smile  VIII:  Unable to assess.  IX, X:  Unable to assess.  XI:  Unable to assess.  XII:  Tongue protudes in the midline.  - Motor:  Unable to assess d/t poor participation. Normal muscle bulk and tone throughout.  - Reflexes:  2+ and symmetric at the biceps, triceps, brachioradialis, knees, and ankles.  Plantar responses flexor.  - Sensory:  Unable to assess.  - Coordination:  Unable to assess.  - Gait:   Unable to assess.    Radiology:  < from: CT Head No Cont (11.03.18 @ 15:17) >  IMPRESSION:    No significant change as compared to prior CT dated May 26, 2014.    No acute intracranial hemorrhage, mass effect or CT evidence of an acute   territorial infarct.    RADHA RYAN M.D., RADIOLOGY RESIDENT  This document has been electronically signed.  ANEL OROURKE M.D., ATTENDING RADIOLOGIST  This document has been electronically signed. Nov  3 2018  4:49PM  < end of copied text >
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
## 2018-11-03 NOTE — H&P ADULT - PROBLEM SELECTOR PLAN 4
Unable to confirm full home medication list or perform medications reconciliation (unable to reach family, called father and mother's number listed in the chart, called pharmacy [place], nobody answered.)   -Primary team to reach out to PMD to verify home meds in AM
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
## 2018-11-03 NOTE — H&P ADULT - PROBLEM SELECTOR PLAN 3
- Mild, likely 2/2 SIADH vs. dehydration.  - Recheck lytes  - Improving with IVF. Will encourage po intake.

## 2018-11-03 NOTE — ED ADULT NURSE NOTE - NSIMPLEMENTINTERV_GEN_ALL_ED
Implemented All Fall with Harm Risk Interventions:  Louise to call system. Call bell, personal items and telephone within reach. Instruct patient to call for assistance. Room bathroom lighting operational. Non-slip footwear when patient is off stretcher. Physically safe environment: no spills, clutter or unnecessary equipment. Stretcher in lowest position, wheels locked, appropriate side rails in place. Provide visual cue, wrist band, yellow gown, etc. Monitor gait and stability. Monitor for mental status changes and reorient to person, place, and time. Review medications for side effects contributing to fall risk. Reinforce activity limits and safety measures with patient and family. Provide visual clues: red socks.

## 2018-11-03 NOTE — ED ADULT NURSE NOTE - OBJECTIVE STATEMENT
Pt to bed 13. Awake, alert x 2. Pt had witnessed seizure. Pt stated that he fell. Abrasion to top of right eye. Pt with right sided weakness. Right arm contracted. IVL placed. Bloods drawn. Seizure precautions in place.

## 2018-11-04 LAB
ALBUMIN SERPL ELPH-MCNC: 3.6 G/DL — SIGNIFICANT CHANGE UP (ref 3.3–5)
ALP SERPL-CCNC: 67 U/L — SIGNIFICANT CHANGE UP (ref 40–120)
ALT FLD-CCNC: 15 U/L — SIGNIFICANT CHANGE UP (ref 4–41)
AST SERPL-CCNC: 20 U/L — SIGNIFICANT CHANGE UP (ref 4–40)
BASOPHILS # BLD AUTO: 0.02 K/UL — SIGNIFICANT CHANGE UP (ref 0–0.2)
BASOPHILS NFR BLD AUTO: 0.4 % — SIGNIFICANT CHANGE UP (ref 0–2)
BILIRUB SERPL-MCNC: 0.5 MG/DL — SIGNIFICANT CHANGE UP (ref 0.2–1.2)
BUN SERPL-MCNC: 7 MG/DL — SIGNIFICANT CHANGE UP (ref 7–23)
CALCIUM SERPL-MCNC: 8.6 MG/DL — SIGNIFICANT CHANGE UP (ref 8.4–10.5)
CHLORIDE SERPL-SCNC: 98 MMOL/L — SIGNIFICANT CHANGE UP (ref 98–107)
CO2 SERPL-SCNC: 17 MMOL/L — LOW (ref 22–31)
CREAT ?TM UR-MCNC: 71 MG/DL — SIGNIFICANT CHANGE UP
CREAT SERPL-MCNC: 0.7 MG/DL — SIGNIFICANT CHANGE UP (ref 0.5–1.3)
EOSINOPHIL # BLD AUTO: 0.05 K/UL — SIGNIFICANT CHANGE UP (ref 0–0.5)
EOSINOPHIL NFR BLD AUTO: 1 % — SIGNIFICANT CHANGE UP (ref 0–6)
GLUCOSE SERPL-MCNC: 90 MG/DL — SIGNIFICANT CHANGE UP (ref 70–99)
HCT VFR BLD CALC: 33 % — LOW (ref 39–50)
HGB BLD-MCNC: 11.5 G/DL — LOW (ref 13–17)
IMM GRANULOCYTES # BLD AUTO: 0.01 # — SIGNIFICANT CHANGE UP
IMM GRANULOCYTES NFR BLD AUTO: 0.2 % — SIGNIFICANT CHANGE UP (ref 0–1.5)
LYMPHOCYTES # BLD AUTO: 1.71 K/UL — SIGNIFICANT CHANGE UP (ref 1–3.3)
LYMPHOCYTES # BLD AUTO: 34.3 % — SIGNIFICANT CHANGE UP (ref 13–44)
MAGNESIUM SERPL-MCNC: 1.8 MG/DL — SIGNIFICANT CHANGE UP (ref 1.6–2.6)
MCHC RBC-ENTMCNC: 31.4 PG — SIGNIFICANT CHANGE UP (ref 27–34)
MCHC RBC-ENTMCNC: 34.8 % — SIGNIFICANT CHANGE UP (ref 32–36)
MCV RBC AUTO: 90.2 FL — SIGNIFICANT CHANGE UP (ref 80–100)
MONOCYTES # BLD AUTO: 0.38 K/UL — SIGNIFICANT CHANGE UP (ref 0–0.9)
MONOCYTES NFR BLD AUTO: 7.6 % — SIGNIFICANT CHANGE UP (ref 2–14)
NEUTROPHILS # BLD AUTO: 2.82 K/UL — SIGNIFICANT CHANGE UP (ref 1.8–7.4)
NEUTROPHILS NFR BLD AUTO: 56.5 % — SIGNIFICANT CHANGE UP (ref 43–77)
NRBC # FLD: 0 — SIGNIFICANT CHANGE UP
OSMOLALITY SERPL: 265 MOSMO/KG — LOW (ref 275–295)
OSMOLALITY UR: 544 MOSMO/KG — SIGNIFICANT CHANGE UP (ref 50–1200)
PHOSPHATE SERPL-MCNC: 3.6 MG/DL — SIGNIFICANT CHANGE UP (ref 2.5–4.5)
PLATELET # BLD AUTO: 161 K/UL — SIGNIFICANT CHANGE UP (ref 150–400)
PMV BLD: 11.2 FL — SIGNIFICANT CHANGE UP (ref 7–13)
POTASSIUM SERPL-MCNC: 3.7 MMOL/L — SIGNIFICANT CHANGE UP (ref 3.5–5.3)
POTASSIUM SERPL-SCNC: 3.7 MMOL/L — SIGNIFICANT CHANGE UP (ref 3.5–5.3)
PROT SERPL-MCNC: 7 G/DL — SIGNIFICANT CHANGE UP (ref 6–8.3)
RBC # BLD: 3.66 M/UL — LOW (ref 4.2–5.8)
RBC # FLD: 11.1 % — SIGNIFICANT CHANGE UP (ref 10.3–14.5)
SODIUM SERPL-SCNC: 128 MMOL/L — LOW (ref 135–145)
SODIUM UR-SCNC: 119 MMOL/L — SIGNIFICANT CHANGE UP
WBC # BLD: 4.99 K/UL — SIGNIFICANT CHANGE UP (ref 3.8–10.5)
WBC # FLD AUTO: 4.99 K/UL — SIGNIFICANT CHANGE UP (ref 3.8–10.5)

## 2018-11-04 PROCEDURE — 99223 1ST HOSP IP/OBS HIGH 75: CPT | Mod: GC

## 2018-11-04 RX ORDER — ACETAMINOPHEN 500 MG
650 TABLET ORAL ONCE
Qty: 0 | Refills: 0 | Status: COMPLETED | OUTPATIENT
Start: 2018-11-04 | End: 2018-11-04

## 2018-11-04 RX ORDER — PANTOPRAZOLE SODIUM 20 MG/1
40 TABLET, DELAYED RELEASE ORAL
Qty: 0 | Refills: 0 | Status: DISCONTINUED | OUTPATIENT
Start: 2018-11-04 | End: 2018-11-08

## 2018-11-04 RX ORDER — CARBAMAZEPINE 200 MG
200 TABLET ORAL ONCE
Qty: 0 | Refills: 0 | Status: COMPLETED | OUTPATIENT
Start: 2018-11-04 | End: 2018-11-04

## 2018-11-04 RX ORDER — BACLOFEN 100 %
10 POWDER (GRAM) MISCELLANEOUS
Qty: 0 | Refills: 0 | Status: DISCONTINUED | OUTPATIENT
Start: 2018-11-04 | End: 2018-11-08

## 2018-11-04 RX ORDER — CARBAMAZEPINE 200 MG
200 TABLET ORAL
Qty: 0 | Refills: 0 | Status: DISCONTINUED | OUTPATIENT
Start: 2018-11-04 | End: 2018-11-06

## 2018-11-04 RX ORDER — ARIPIPRAZOLE 15 MG/1
5 TABLET ORAL DAILY
Qty: 0 | Refills: 0 | Status: DISCONTINUED | OUTPATIENT
Start: 2018-11-04 | End: 2018-11-08

## 2018-11-04 RX ORDER — CARBAMAZEPINE 200 MG
200 TABLET ORAL
Qty: 0 | Refills: 0 | Status: DISCONTINUED | OUTPATIENT
Start: 2018-11-04 | End: 2018-11-04

## 2018-11-04 RX ADMIN — Medication 200 MILLIGRAM(S): at 00:37

## 2018-11-04 RX ADMIN — Medication 200 MILLIGRAM(S): at 17:35

## 2018-11-04 RX ADMIN — Medication 200 MILLIGRAM(S): at 21:43

## 2018-11-04 RX ADMIN — ENOXAPARIN SODIUM 40 MILLIGRAM(S): 100 INJECTION SUBCUTANEOUS at 21:51

## 2018-11-04 RX ADMIN — Medication 20 MILLIEQUIVALENT(S): at 02:07

## 2018-11-04 RX ADMIN — Medication 200 MILLIGRAM(S): at 06:46

## 2018-11-04 RX ADMIN — Medication 20 MILLIEQUIVALENT(S): at 05:38

## 2018-11-04 RX ADMIN — ATORVASTATIN CALCIUM 20 MILLIGRAM(S): 80 TABLET, FILM COATED ORAL at 21:45

## 2018-11-04 RX ADMIN — Medication 20 MILLIEQUIVALENT(S): at 00:59

## 2018-11-04 RX ADMIN — Medication 10 MILLIGRAM(S): at 17:35

## 2018-11-04 RX ADMIN — PANTOPRAZOLE SODIUM 40 MILLIGRAM(S): 20 TABLET, DELAYED RELEASE ORAL at 21:45

## 2018-11-04 RX ADMIN — ARIPIPRAZOLE 5 MILLIGRAM(S): 15 TABLET ORAL at 17:35

## 2018-11-04 NOTE — PROGRESS NOTE ADULT - PROBLEM SELECTOR PLAN 1
Likely due to recent mild head trauma due to a fall;   -CT head unchanged  -Therapeutic Carbamazepine level   -Neuro consulted from ED. vEEG  -Frequent neuro status check

## 2018-11-04 NOTE — PROGRESS NOTE ADULT - PROBLEM SELECTOR PLAN 4
- Unable to confirm full home medication list or perform medications reconciliation (unable to reach family, called father and mother's number listed in the chart, called pharmacy [place], nobody answered.)   - Contacted [facility] pharmacy, able to complete medication reconciliation

## 2018-11-04 NOTE — PROGRESS NOTE ADULT - SUBJECTIVE AND OBJECTIVE BOX
***************************************************************  Dr. Luis M Stratton, PGY3  Internal Medicine   [facility] Pager: [phone]  [facility] Pager: [phone]  ***************************************************************    TASIA WARE  37y  MRN: [id number]    Subjective:    Patient is a 37y old  Male who presents with a chief complaint of Fall & seizure at home (2018 23:27)      HPI: Patient is minimally verbal. Able to endorse self and place.       MEDICATIONS  (STANDING):  acetaminophen   Tablet .. 650 milliGRAM(s) Oral once  atorvastatin 20 milliGRAM(s) Oral at bedtime  carBAMazepine XR Tablet 200 milliGRAM(s) Oral at bedtime  carBAMazepine XR Tablet 400 milliGRAM(s) Oral daily  enoxaparin Injectable 40 milliGRAM(s) SubCutaneous every 24 hours  topiramate 200 milliGRAM(s) Oral every 12 hours    MEDICATIONS  (PRN):        Objective:    Vitals: Vital Signs Last 24 Hrs  T(C): 36.5 (18 @ 05:45), Max: 36.8 (18 @ 16:02)  T(F): 97.7 (18 @ 05:45), Max: 98.3 (18 @ 20:28)  HR: 72 (18 @ 05:45) (66 - 72)  BP: 118/76 (18 @ 05:45) (118/76 - 137/79)  BP(mean): --  RR: 18 (18 @ 05:45) (16 - 18)  SpO2: 100% (18 @ 05:45) (100% - 100%)            I&O's Summary      PHYSICAL EXAM:  GENERAL: NAD  HEAD:  Atraumatic, Normocephalic  EYES: EOMI, PERRLA, conjunctiva and sclera clear  CHEST/LUNG: Clear to percussion bilaterally; No rales, rhonchi, wheezing, or rubs  HEART: Regular rate and rhythm; No murmurs, rubs, or gallops  ABDOMEN: Soft, Nontender, Nondistended; Bowel sounds present  SKIN: No rashes or lesions  NERVOUS SYSTEM:  Alert & Oriented X3, Good concentration; Motor Strength 5/5 B/L upper and lower extremities                                           LABS:      128<L>  |  98  |  7   ----------------------------<  90  3.7   |  17<L>  |  0.70      130<L>  |  103  |  7   ----------------------------<  118<H>  3.0<L>   |  19<L>  |  0.68      129<L>  |  102  |  7   ----------------------------<  93  3.5   |  16<L>  |  0.69    Ca    8.6      2018 05:21  Ca    8.4      2018 22:45  Ca    8.1<L>      2018 17:00  Phos  3.6       Mg     1.8         TPro  7.0  /  Alb  3.6  /  TBili  0.5  /  DBili  x   /  AST  20  /  ALT  15  /  AlkPhos  67    TPro  8.3  /  Alb  4.3  /  TBili  0.4  /  DBili  x   /  AST  37  /  ALT  19  /  AlkPhos  72                      Urinalysis Basic - ( 2018 13:37 )    Color: YELLOW / Appearance: HAZY / S.018 / pH: 7.5  Gluc: NEGATIVE / Ketone: NEGATIVE  / Bili: NEGATIVE / Urobili: NORMAL   Blood: NEGATIVE / Protein: TRACE / Nitrite: NEGATIVE   Leuk Esterase: NEGATIVE / RBC: 0-2 / WBC 0-2   Sq Epi: OCC / Non Sq Epi: x / Bacteria: NEGATIVE                              11.5   4.99  )-----------( 161      ( 2018 05:21 )             33.0                         13.5   5.72  )-----------( 186      ( 2018 12:50 )             38.1     CAPILLARY BLOOD GLUCOSE          RADIOLOGY & ADDITIONAL TESTS:    Imaging Personally Reviewed:  [ ] YES  [ ] NO      Consultants involved in case:   Consultant(s) Notes Reviewed:  [ ] YES  [ ] NO:   Care Discussed with Consultants/Other Providers [ ] YES  [ ] NO

## 2018-11-04 NOTE — CHART NOTE - NSCHARTNOTEFT_GEN_A_CORE
Spoke with the patient's mother Thuy Bryan over the phone. States the patient suffered a fall and a seizure later on in the day. Has not seen a neurologist in 10 years, and his AED has been managed by his PCP Dr. Ramos ([phone]) since then. The patient is able to tolerate a regular diet and has added ensure to his diet as well. No constitutional symptoms per the mother recently. The patient uses a wheelchair for mobility.     Luis M Stratton MD PGY3  [facility] Pager #: [phone]  [facility] Pager #: [phone]

## 2018-11-04 NOTE — PROGRESS NOTE ADULT - PROBLEM SELECTOR PLAN 3
- Mild, likely 2/2 SIADH vs. dehydration.  - patient has chronic hyponatremia   - Improving with IVF. Will encourage po intake.
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
## 2018-11-05 LAB
BACTERIA UR CULT: SIGNIFICANT CHANGE UP
BASOPHILS # BLD AUTO: 0.03 K/UL — SIGNIFICANT CHANGE UP (ref 0–0.2)
BASOPHILS NFR BLD AUTO: 0.5 % — SIGNIFICANT CHANGE UP (ref 0–2)
BUN SERPL-MCNC: 8 MG/DL — SIGNIFICANT CHANGE UP (ref 7–23)
CALCIUM SERPL-MCNC: 8.9 MG/DL — SIGNIFICANT CHANGE UP (ref 8.4–10.5)
CHLORIDE SERPL-SCNC: 96 MMOL/L — LOW (ref 98–107)
CO2 SERPL-SCNC: 16 MMOL/L — LOW (ref 22–31)
CREAT SERPL-MCNC: 0.68 MG/DL — SIGNIFICANT CHANGE UP (ref 0.5–1.3)
EOSINOPHIL # BLD AUTO: 0.03 K/UL — SIGNIFICANT CHANGE UP (ref 0–0.5)
EOSINOPHIL NFR BLD AUTO: 0.5 % — SIGNIFICANT CHANGE UP (ref 0–6)
GLUCOSE SERPL-MCNC: 82 MG/DL — SIGNIFICANT CHANGE UP (ref 70–99)
HCT VFR BLD CALC: 36.7 % — LOW (ref 39–50)
HGB BLD-MCNC: 12.9 G/DL — LOW (ref 13–17)
IMM GRANULOCYTES # BLD AUTO: 0.01 # — SIGNIFICANT CHANGE UP
IMM GRANULOCYTES NFR BLD AUTO: 0.2 % — SIGNIFICANT CHANGE UP (ref 0–1.5)
LYMPHOCYTES # BLD AUTO: 0.98 K/UL — LOW (ref 1–3.3)
LYMPHOCYTES # BLD AUTO: 16.7 % — SIGNIFICANT CHANGE UP (ref 13–44)
MAGNESIUM SERPL-MCNC: 1.9 MG/DL — SIGNIFICANT CHANGE UP (ref 1.6–2.6)
MCHC RBC-ENTMCNC: 32.4 PG — SIGNIFICANT CHANGE UP (ref 27–34)
MCHC RBC-ENTMCNC: 35.1 % — SIGNIFICANT CHANGE UP (ref 32–36)
MCV RBC AUTO: 92.2 FL — SIGNIFICANT CHANGE UP (ref 80–100)
MONOCYTES # BLD AUTO: 0.38 K/UL — SIGNIFICANT CHANGE UP (ref 0–0.9)
MONOCYTES NFR BLD AUTO: 6.5 % — SIGNIFICANT CHANGE UP (ref 2–14)
NEUTROPHILS # BLD AUTO: 4.44 K/UL — SIGNIFICANT CHANGE UP (ref 1.8–7.4)
NEUTROPHILS NFR BLD AUTO: 75.6 % — SIGNIFICANT CHANGE UP (ref 43–77)
NRBC # FLD: 0 — SIGNIFICANT CHANGE UP
PHOSPHATE SERPL-MCNC: 3.8 MG/DL — SIGNIFICANT CHANGE UP (ref 2.5–4.5)
PLATELET # BLD AUTO: 171 K/UL — SIGNIFICANT CHANGE UP (ref 150–400)
PMV BLD: 11 FL — SIGNIFICANT CHANGE UP (ref 7–13)
POTASSIUM SERPL-MCNC: 3.6 MMOL/L — SIGNIFICANT CHANGE UP (ref 3.5–5.3)
POTASSIUM SERPL-SCNC: 3.6 MMOL/L — SIGNIFICANT CHANGE UP (ref 3.5–5.3)
RBC # BLD: 3.98 M/UL — LOW (ref 4.2–5.8)
RBC # FLD: 10.9 % — SIGNIFICANT CHANGE UP (ref 10.3–14.5)
SODIUM SERPL-SCNC: 129 MMOL/L — LOW (ref 135–145)
SPECIMEN SOURCE: SIGNIFICANT CHANGE UP
WBC # BLD: 5.87 K/UL — SIGNIFICANT CHANGE UP (ref 3.8–10.5)
WBC # FLD AUTO: 5.87 K/UL — SIGNIFICANT CHANGE UP (ref 3.8–10.5)

## 2018-11-05 PROCEDURE — 99233 SBSQ HOSP IP/OBS HIGH 50: CPT | Mod: GC

## 2018-11-05 PROCEDURE — 95819 EEG AWAKE AND ASLEEP: CPT | Mod: 26

## 2018-11-05 RX ADMIN — ENOXAPARIN SODIUM 40 MILLIGRAM(S): 100 INJECTION SUBCUTANEOUS at 22:00

## 2018-11-05 RX ADMIN — Medication 10 MILLIGRAM(S): at 18:49

## 2018-11-05 RX ADMIN — ARIPIPRAZOLE 5 MILLIGRAM(S): 15 TABLET ORAL at 11:11

## 2018-11-05 RX ADMIN — Medication 10 MILLIGRAM(S): at 06:20

## 2018-11-05 RX ADMIN — Medication 200 MILLIGRAM(S): at 18:49

## 2018-11-05 RX ADMIN — ATORVASTATIN CALCIUM 20 MILLIGRAM(S): 80 TABLET, FILM COATED ORAL at 22:00

## 2018-11-05 RX ADMIN — Medication 200 MILLIGRAM(S): at 06:20

## 2018-11-05 RX ADMIN — Medication 200 MILLIGRAM(S): at 22:00

## 2018-11-05 RX ADMIN — PANTOPRAZOLE SODIUM 40 MILLIGRAM(S): 20 TABLET, DELAYED RELEASE ORAL at 06:20

## 2018-11-05 RX ADMIN — Medication 200 MILLIGRAM(S): at 14:29
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
## 2018-11-05 NOTE — PROGRESS NOTE ADULT - PROBLEM SELECTOR PLAN 4
- Unable to confirm full home medication list or perform medications reconciliation (unable to reach family, called father and mother's number listed in the chart, called pharmacy in [place], nobody answered.)   - Contacted [facility] pharmacy, able to complete medication reconciliation Contacted patient's pharmacy, [facility], able to complete medication reconciliation  - anti-epileptic medications managed by patient's PCP per family report, as pt has not been seeing a Neurologist

## 2018-11-05 NOTE — PROGRESS NOTE ADULT - SUBJECTIVE AND OBJECTIVE BOX
Dr. Lidia Rutledge, PGY1  [phone]    TASIA WARE  37y  MRN: [id number]    Subjective:  Patient is a 37y old  Male who presents with a chief complaint of Fall & seizure at home (2018 12:23)      Overnight:   No acute events. This morning appears to be in no acute distress, resting comfortably.      MEDICATIONS  (STANDING):  ARIPiprazole 5 milliGRAM(s) Oral daily  atorvastatin 20 milliGRAM(s) Oral at bedtime  baclofen 10 milliGRAM(s) Oral two times a day  carBAMazepine XR Tablet 200 milliGRAM(s) Oral <User Schedule>  enoxaparin Injectable 40 milliGRAM(s) SubCutaneous every 24 hours  pantoprazole    Tablet 40 milliGRAM(s) Oral before breakfast  topiramate 200 milliGRAM(s) Oral every 12 hours    MEDICATIONS  (PRN):        Objective:  Vitals: Vital Signs Last 24 Hrs  T(C): 37.1 (18 @ 06:10), Max: 37.1 (18 @ 06:10)  T(F): 98.8 (18 @ 06:10), Max: 98.8 (-18 @ 06:10)  HR: 77 (18 @ 06:10) (63 - 79)  BP: 108/67 (18 @ 06:10) (106/73 - 139/66)  BP(mean): --  RR: 18 (18 @ 06:10) (18 - 18)  SpO2: 100% (18 @ 06:10) (100% - 100%)                I&O's Summary    2018 07:01  -  2018 07:00  --------------------------------------------------------  IN: 280 mL / OUT: 1200 mL / NET: -920 mL          PHYSICAL EXAM:  GENERAL: NAD, well-groomed  HEAD:  Atraumatic, Normocephalic  EYES: EOMI, PERRLA, conjunctiva and sclera clear  ENMT: No tonsillar erythema, exudates, or enlargement; Moist mucous membranes, Good dentition, No lesions  NECK: Supple, No JVD, Normal thyroid, full ROM  CHEST/LUNG: Clear to auscultation bilaterally; No rales, rhonchi, wheezing, or rubs  HEART: Regular rate and rhythm; No murmurs, rubs, or gallops  ABDOMEN: Soft, Nontender, Nondistended; Bowel sounds present  EXTREMITIES:  2+ Peripheral Pulses, No clubbing, cyanosis, or edema  LYMPH: No lymphadenopathy noted  SKIN: No rashes or lesions  NERVOUS SYSTEM:  Alert; b/l upper extremity contractures                                             LABS:      129<L>  |  96<L>  |  8   ----------------------------<  82  3.6   |  16<L>  |  0.68      128<L>  |  98  |  7   ----------------------------<  90  3.7   |  17<L>  |  0.70      130<L>  |  103  |  7   ----------------------------<  118<H>  3.0<L>   |  19<L>  |  0.68    Ca    8.9      2018 05:30  Ca    8.6      2018 05:21  Ca    8.4      2018 22:45  Phos  3.8       Mg     1.9         TPro  7.0  /  Alb  3.6  /  TBili  0.5  /  DBili  x   /  AST  20  /  ALT  15  /  AlkPhos  67    TPro  8.3  /  Alb  4.3  /  TBili  0.4  /  DBili  x   /  AST  37  /  ALT  19  /  AlkPhos  72                      Urinalysis Basic - ( 2018 13:37 )    Color: YELLOW / Appearance: HAZY / S.018 / pH: 7.5  Gluc: NEGATIVE / Ketone: NEGATIVE  / Bili: NEGATIVE / Urobili: NORMAL   Blood: NEGATIVE / Protein: TRACE / Nitrite: NEGATIVE   Leuk Esterase: NEGATIVE / RBC: 0-2 / WBC 0-2   Sq Epi: OCC / Non Sq Epi: x / Bacteria: NEGATIVE                              12.9   5.87  )-----------( 171      ( 2018 05:30 )             36.7                         11.5   4.99  )-----------( 161      ( 2018 05:21 )             33.0                         13.5   5.72  )-----------( 186      ( 2018 12:50 )             38.1     CAPILLARY BLOOD GLUCOSE            RADIOLOGY & ADDITIONAL TESTS:    Imaging Personally Reviewed:  [ ] YES  [ ] NO      Consultants involved in case:     Consultant(s) Notes Reviewed:  [ ] YES  [ ] NO:   Care Discussed with Consultants/Other Providers [ ] YES  [ ] Dr. Lidia Rutledge, PGY1  Pager [phone]    TASIA WARE  37y  MRN: [id number]    Subjective:  Patient is a 37y old  Male who presents with a chief complaint of Fall & seizure at home (2018 12:23)      Overnight:   No acute events. This morning appears to be in no acute distress, resting comfortably.      MEDICATIONS  (STANDING):  ARIPiprazole 5 milliGRAM(s) Oral daily  atorvastatin 20 milliGRAM(s) Oral at bedtime  baclofen 10 milliGRAM(s) Oral two times a day  carBAMazepine XR Tablet 200 milliGRAM(s) Oral <User Schedule>  enoxaparin Injectable 40 milliGRAM(s) SubCutaneous every 24 hours  pantoprazole    Tablet 40 milliGRAM(s) Oral before breakfast  topiramate 200 milliGRAM(s) Oral every 12 hours    MEDICATIONS  (PRN):        Objective:  Vitals: Vital Signs Last 24 Hrs  T(C): 37.1 (18 @ 06:10), Max: 37.1 (18 @ 06:10)  T(F): 98.8 (18 @ 06:10), Max: 98.8 (18 @ 06:10)  HR: 77 (18 @ 06:10) (63 - 79)  BP: 108/67 (18 @ 06:10) (106/73 - 139/66)  BP(mean): --  RR: 18 (18 @ 06:10) (18 - 18)  SpO2: 100% (18 @ 06:10) (100% - 100%)                I&O's Summary    2018 07:01  -  2018 07:00  --------------------------------------------------------  IN: 280 mL / OUT: 1200 mL / NET: -920 mL          PHYSICAL EXAM:  GENERAL: NAD, well-groomed  HEAD:  Atraumatic, Normocephalic  EYES: EOMI, PERRLA, conjunctiva and sclera clear  ENMT: No tonsillar erythema, exudates, or enlargement; Moist mucous membranes, Good dentition, No lesions  NECK: Supple, No JVD, Normal thyroid, full ROM  CHEST/LUNG: Clear to auscultation bilaterally; No rales, rhonchi, wheezing, or rubs  HEART: Regular rate and rhythm; No murmurs, rubs, or gallops  ABDOMEN: Soft, Nontender, Nondistended; Bowel sounds present  EXTREMITIES:  2+ Peripheral Pulses, No clubbing, cyanosis, or edema  LYMPH: No lymphadenopathy noted  SKIN: No rashes or lesions  NERVOUS SYSTEM:  Alert; b/l upper extremity contractures                                             LABS:      129<L>  |  96<L>  |  8   ----------------------------<  82  3.6   |  16<L>  |  0.68      128<L>  |  98  |  7   ----------------------------<  90  3.7   |  17<L>  |  0.70      130<L>  |  103  |  7   ----------------------------<  118<H>  3.0<L>   |  19<L>  |  0.68    Ca    8.9      2018 05:30  Ca    8.6      2018 05:21  Ca    8.4      2018 22:45  Phos  3.8       Mg     1.9         TPro  7.0  /  Alb  3.6  /  TBili  0.5  /  DBili  x   /  AST  20  /  ALT  15  /  AlkPhos  67    TPro  8.3  /  Alb  4.3  /  TBili  0.4  /  DBili  x   /  AST  37  /  ALT  19  /  AlkPhos  72                      Urinalysis Basic - ( 2018 13:37 )    Color: YELLOW / Appearance: HAZY / S.018 / pH: 7.5  Gluc: NEGATIVE / Ketone: NEGATIVE  / Bili: NEGATIVE / Urobili: NORMAL   Blood: NEGATIVE / Protein: TRACE / Nitrite: NEGATIVE   Leuk Esterase: NEGATIVE / RBC: 0-2 / WBC 0-2   Sq Epi: OCC / Non Sq Epi: x / Bacteria: NEGATIVE                              12.9   5.87  )-----------( 171      ( 2018 05:30 )             36.7                         11.5   4.99  )-----------( 161      ( 2018 05:21 )             33.0                         13.5   5.72  )-----------( 186      ( 2018 12:50 )             38.1         RADIOLOGY & ADDITIONAL TESTS:  CT Head No Cont (18 @ 15:17)  IMPRESSION:  No significant change as compared to prior CT dated May 26, 2014.  No acute intracranial hemorrhage, mass effect or CT evidence of an acute   territorial infarct.    Imaging Personally Reviewed:  [ ] YES  [ ] NO      Consultants involved in case:   Neurology:  1. Toxic Metabolic w/u  2. vEEG 24hr.   3. Will follow.     Consultant(s) Notes Reviewed:  [ ] YES  [ ] NO:   Care Discussed with Consultants/Other Providers [ ] YES  [ ]

## 2018-11-05 NOTE — PROGRESS NOTE ADULT - PROBLEM SELECTOR PLAN 3
- Mild, likely 2/2 SIADH vs. dehydration.  - patient has chronic hyponatremia   - Improving with IVF. Will encourage po intake. - Mild, likely 2/2 SIADH vs. dehydration.  - patient has chronic hyponatremia   - fluid restriction to 1000mL/day due to SIADH (Uosm 544, Hue 119)

## 2018-11-05 NOTE — PROGRESS NOTE ADULT - PROBLEM SELECTOR PLAN 2
- Unclear etiology given lack of clinical history. Previous record with fall at home from seizure activities. (similar to this admission).  - Continue to monitor while inpatient.  - Fall risk ordered.   - CTH negative. If worsening neuro status, will repeat imaging. - Unclear etiology given lack of clinical history. Previous record with fall at home from seizure activities (similar to this admission)  - Continue to monitor while inpatient.  - Fall risk ordered.   - CTH negative. If worsening neuro status, will repeat imaging. - Unclear etiology given lack of clinical history. Previous record with fall at home from seizure activities (similar to this admission)  - Continue to monitor while inpatient.  - Fall risk ordered.   - CTH negative. If worsening neuro status, will repeat imaging.  - f/u PT consult s/p fall

## 2018-11-05 NOTE — PROGRESS NOTE ADULT - PROBLEM SELECTOR PLAN 1
Likely due to recent mild head trauma due to a fall;   -CT head unchanged  -Therapeutic Carbamazepine level   -Neuro consulted from ED. vEEG  -Frequent neuro status check Likely due to recent mild head trauma due to a fall;   - CT head unchanged  - c/w Carbamazepine 200mg TID, Therapeutic Carbamazepine level 8.8  - c/w Topiramate 200mg PO Q12h  - Neuro consulted from ED, to do 24hr video EEG  - Frequent neuro status checks Likely due to recent mild head trauma due to a fall;   - CT head unchanged  - c/w Carbamazepine 200mg TID, Therapeutic Carbamazepine level 8.8  - c/w Topiramate 200mg PO Q12h  - Neuro consulted from ED, plan for 24hr video EEG scheduled 11/5  - Frequent neuro status checks

## 2018-11-06 LAB
AMMONIA BLD-MCNC: 59 UMOL/L — HIGH (ref 11–55)
BUN SERPL-MCNC: 13 MG/DL — SIGNIFICANT CHANGE UP (ref 7–23)
CALCIUM SERPL-MCNC: 9 MG/DL — SIGNIFICANT CHANGE UP (ref 8.4–10.5)
CHLORIDE SERPL-SCNC: 96 MMOL/L — LOW (ref 98–107)
CO2 SERPL-SCNC: 17 MMOL/L — LOW (ref 22–31)
CREAT SERPL-MCNC: 0.77 MG/DL — SIGNIFICANT CHANGE UP (ref 0.5–1.3)
GLUCOSE SERPL-MCNC: 85 MG/DL — SIGNIFICANT CHANGE UP (ref 70–99)
LACTATE SERPL-SCNC: 0.7 MMOL/L — SIGNIFICANT CHANGE UP (ref 0.5–2)
LACTATE SERPL-SCNC: 0.9 MMOL/L — SIGNIFICANT CHANGE UP (ref 0.5–2)
MAGNESIUM SERPL-MCNC: 1.9 MG/DL — SIGNIFICANT CHANGE UP (ref 1.6–2.6)
PHOSPHATE SERPL-MCNC: 3.9 MG/DL — SIGNIFICANT CHANGE UP (ref 2.5–4.5)
POTASSIUM SERPL-MCNC: 3.3 MMOL/L — LOW (ref 3.5–5.3)
POTASSIUM SERPL-SCNC: 3.3 MMOL/L — LOW (ref 3.5–5.3)
SODIUM SERPL-SCNC: 131 MMOL/L — LOW (ref 135–145)
TSH SERPL-MCNC: 0.62 UIU/ML — SIGNIFICANT CHANGE UP (ref 0.27–4.2)

## 2018-11-06 PROCEDURE — 99233 SBSQ HOSP IP/OBS HIGH 50: CPT

## 2018-11-06 PROCEDURE — 95951: CPT | Mod: 26

## 2018-11-06 PROCEDURE — 99232 SBSQ HOSP IP/OBS MODERATE 35: CPT

## 2018-11-06 RX ORDER — LACOSAMIDE 50 MG/1
200 TABLET ORAL ONCE
Qty: 0 | Refills: 0 | Status: DISCONTINUED | OUTPATIENT
Start: 2018-11-06 | End: 2018-11-06

## 2018-11-06 RX ORDER — POTASSIUM CHLORIDE 20 MEQ
20 PACKET (EA) ORAL ONCE
Qty: 0 | Refills: 0 | Status: COMPLETED | OUTPATIENT
Start: 2018-11-06 | End: 2018-11-06

## 2018-11-06 RX ORDER — LACOSAMIDE 50 MG/1
150 TABLET ORAL
Qty: 0 | Refills: 0 | Status: DISCONTINUED | OUTPATIENT
Start: 2018-11-06 | End: 2018-11-08

## 2018-11-06 RX ADMIN — PANTOPRAZOLE SODIUM 40 MILLIGRAM(S): 20 TABLET, DELAYED RELEASE ORAL at 06:00

## 2018-11-06 RX ADMIN — ARIPIPRAZOLE 5 MILLIGRAM(S): 15 TABLET ORAL at 11:38

## 2018-11-06 RX ADMIN — Medication 20 MILLIEQUIVALENT(S): at 19:10

## 2018-11-06 RX ADMIN — Medication 200 MILLIGRAM(S): at 05:59

## 2018-11-06 RX ADMIN — ENOXAPARIN SODIUM 40 MILLIGRAM(S): 100 INJECTION SUBCUTANEOUS at 21:58

## 2018-11-06 RX ADMIN — LACOSAMIDE 150 MILLIGRAM(S): 50 TABLET ORAL at 22:01

## 2018-11-06 RX ADMIN — Medication 10 MILLIGRAM(S): at 19:10

## 2018-11-06 RX ADMIN — Medication 10 MILLIGRAM(S): at 06:00

## 2018-11-06 RX ADMIN — ATORVASTATIN CALCIUM 20 MILLIGRAM(S): 80 TABLET, FILM COATED ORAL at 21:58

## 2018-11-06 RX ADMIN — LACOSAMIDE 140 MILLIGRAM(S): 50 TABLET ORAL at 11:36

## 2018-11-06 RX ADMIN — Medication 200 MILLIGRAM(S): at 19:10

## 2018-11-06 NOTE — DIETITIAN INITIAL EVALUATION ADULT. - NS AS NUTRI INTERV MEALS SNACK
1. Continue Regular diet. Fluid Restriction per medical team discretion. 2. Continue Ensure Enlive 240mls 2x daily (700kcal, 40g protein). 3. Consider multivitamin daily for micronutrient coverage. 4. Please Encourage po intake, assist with meals and menu selections, provide alternatives PRN. 5. Monitor weights, labs, BM's, skin integrity, p.o. intake.

## 2018-11-06 NOTE — DIETITIAN INITIAL EVALUATION ADULT. - PERTINENT MEDS FT
ARIPiprazole  atorvastatin  baclofen  enoxaparin Injectable  lacosamide  pantoprazole    Tablet  potassium chloride   Powder  topiramate

## 2018-11-06 NOTE — PHYSICAL THERAPY INITIAL EVALUATION ADULT - CRITERIA FOR SKILLED THERAPEUTIC INTERVENTIONS
impairments found/therapy frequency/rehab potential/anticipated discharge recommendation/predicted duration of therapy intervention

## 2018-11-06 NOTE — DIETITIAN INITIAL EVALUATION ADULT. - PHYSICAL APPEARANCE
Upon nutrition focused physical exam moderate triceps fat loss and moderate muscle mass depletion (shoulder, clavicle, quadriceps) evident.
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
## 2018-11-06 NOTE — PHYSICAL THERAPY INITIAL EVALUATION ADULT - ADDITIONAL COMMENTS
Pt. reports family takes care of pt. needs.    Patient left supine in bed as received, NAD, call bell in reach, all lines/devices intact, RN aware.

## 2018-11-06 NOTE — PROGRESS NOTE ADULT - PROBLEM SELECTOR PLAN 4
Contacted patient's pharmacy, [facility], able to complete medication reconciliation  - anti-epileptic medications managed by patient's PCP per family report, as pt has not been seeing a Neurologist

## 2018-11-06 NOTE — PROGRESS NOTE ADULT - PROBLEM SELECTOR PLAN 2
- Unclear etiology given lack of clinical history. Previous record with fall at home from seizure activities (similar to this admission)  - Continue to monitor while inpatient.  - Fall risk ordered.   - CTH negative. If worsening neuro status, will repeat imaging.  - f/u PT consult s/p fall - Unclear etiology given lack of clinical history. Previous record with fall at home from seizure activities (similar to this admission)  - Continue to monitor while inpatient.  - Fall risk ordered.   - CTH negative. If worsening neuro status, will repeat imaging.  - f/u PT recs

## 2018-11-06 NOTE — PHYSICAL THERAPY INITIAL EVALUATION ADULT - GENERAL OBSERVATIONS, REHAB EVAL
Consult received, chart reviewed. Patient received supine in bed, NAD, +EEG active. Patient agreed to EVALUATION from Physical Therapist.

## 2018-11-06 NOTE — DIETITIAN INITIAL EVALUATION ADULT. - ENERGY NEEDS
Weight: 54.8kg  Height: 167.64cm   BMI: 19.5kg/m^2  IBW: 64.5kg+/-10%  No pressure ulcers/DTI noted per flowsheets. No edema present.
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
## 2018-11-06 NOTE — PROGRESS NOTE ADULT - PROBLEM SELECTOR PLAN 3
- Mild, likely 2/2 SIADH vs. dehydration.  - patient has chronic hyponatremia   - fluid restriction to 1000mL/day due to SIADH (Uosm 544, Hue 119)  - discontinue Carbamazepine - Mild, likely 2/2 SIADH vs. dehydration.  - patient has chronic hyponatremia   - fluid restriction to 1000mL/day due to SIADH (Uosm 544, Hue 119)  - discontinue Carbamazepine    Also w/ stable decrease in HCO3. Unclear etiology at this time. No current evidence of renal dysfunction or GI losses. Possible SE of topiramate. ?RTA
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
## 2018-11-06 NOTE — DIETITIAN INITIAL EVALUATION ADULT. - PERTINENT LABORATORY DATA
11-06 Na 131 mmol/L<L> Glu 85 mg/dL K+ 3.3 mmol/L<L> Cr 0.77 mg/dL BUN 13 mg/dL Phos 3.9 mg/dL Alb n/a   PAB n/a   Hgb n/a   Hct n/a   HgA1C n/a    Glucose, Serum: 85 mg/dL

## 2018-11-06 NOTE — DIETITIAN INITIAL EVALUATION ADULT. - SOURCE
Patient noted to be non-verbal. No family/caregiver at bedside. Information obtained from extensive chart review. Spoke to nursing student at bedside./other (specify)

## 2018-11-06 NOTE — PROGRESS NOTE ADULT - SUBJECTIVE AND OBJECTIVE BOX
Dr. Lidia Rutledge, PGY1  Pager [phone]    TASIA WARE  37y  MRN: [id number]    Subjective:  Patient is a 37y old  Male who presents with a chief complaint of Fall & seizure at home (06 Nov 2018 09:06)      Overnight:       MEDICATIONS  (STANDING):  ARIPiprazole 5 milliGRAM(s) Oral daily  atorvastatin 20 milliGRAM(s) Oral at bedtime  baclofen 10 milliGRAM(s) Oral two times a day  carBAMazepine XR Tablet 200 milliGRAM(s) Oral <User Schedule>  enoxaparin Injectable 40 milliGRAM(s) SubCutaneous every 24 hours  pantoprazole    Tablet 40 milliGRAM(s) Oral before breakfast  topiramate 200 milliGRAM(s) Oral every 12 hours    MEDICATIONS  (PRN):        Objective:  Vitals: Vital Signs Last 24 Hrs  T(C): 37.2 (11-06-18 @ 06:20), Max: 37.6 (11-05-18 @ 21:30)  T(F): 98.9 (11-06-18 @ 06:20), Max: 99.6 (11-05-18 @ 21:30)  HR: 84 (11-06-18 @ 06:20) (84 - 87)  BP: 114/72 (11-06-18 @ 06:20) (109/71 - 120/78)  BP(mean): --  RR: 18 (11-06-18 @ 06:20) (17 - 18)  SpO2: 97% (11-06-18 @ 06:20) (96% - 100%)                I&O's Summary    05 Nov 2018 07:01  -  06 Nov 2018 07:00  --------------------------------------------------------  IN: 120 mL / OUT: 300 mL / NET: -180 mL          PHYSICAL EXAM:  GENERAL: NAD, well-groomed, well-developed  HEAD:  Atraumatic, Normocephalic  EYES: EOMI, PERRLA, conjunctiva and sclera clear  ENMT: No tonsillar erythema, exudates, or enlargement; Moist mucous membranes, Good dentition, No lesions  NECK: Supple, No JVD, Normal thyroid, full ROM  CHEST/LUNG: Clear to auscultation bilaterally; No rales, rhonchi, wheezing, or rubs  HEART: Regular rate and rhythm; No murmurs, rubs, or gallops  ABDOMEN: Soft, Nontender, Nondistended; Bowel sounds present  EXTREMITIES:  2+ Peripheral Pulses, No clubbing, cyanosis, or edema  LYMPH: No lymphadenopathy noted  SKIN: No rashes or lesions  NERVOUS SYSTEM:  Alert & Oriented X3, Good concentration; Motor Strength 5/5 B/L upper and lower extremities; DTRs 2+ intact and symmetric                                             LABS:  11-06    131<L>  |  96<L>  |  13  ----------------------------<  85  3.3<L>   |  17<L>  |  0.77  11-05    129<L>  |  96<L>  |  8   ----------------------------<  82  3.6   |  16<L>  |  0.68  11-04    128<L>  |  98  |  7   ----------------------------<  90  3.7   |  17<L>  |  0.70    Ca    9.0      06 Nov 2018 05:10  Ca    8.9      05 Nov 2018 05:30  Ca    8.6      04 Nov 2018 05:21  Phos  3.9     11-06  Mg     1.9     11-06    TPro  7.0  /  Alb  3.6  /  TBili  0.5  /  DBili  x   /  AST  20  /  ALT  15  /  AlkPhos  67  11-04  TPro  8.3  /  Alb  4.3  /  TBili  0.4  /  DBili  x   /  AST  37  /  ALT  19  /  AlkPhos  72  11-03                                          12.9   5.87  )-----------( 171      ( 05 Nov 2018 05:30 )             36.7                         11.5   4.99  )-----------( 161      ( 04 Nov 2018 05:21 )             33.0                         13.5   5.72  )-----------( 186      ( 03 Nov 2018 12:50 )             38.1           RADIOLOGY & ADDITIONAL TESTS:  EEG Classification / Summary:  Abnormal EEG Study, awake   -Left frontotemporal slowing  -Moderate diffuse slowing  Clinical Impression:  Moderate multifocal cerebral dysfunction, persistently worse in the left fronotemporal region.  There were no epileptiform abnormalities recorded.    f/u results of Video EEG      Imaging Personally Reviewed:  [ ] YES  [ ] NO      Consultants involved in case:   NEUROLOGY:  Suggest  1. start Vimpat - give 200mg IV x 1 then 150 mg q12 po; DC carbamazepine. Continue topiramate.   2. continue vEEG during AED transition.  3. OK for discharge if stable on Vimpat x 24h       Consultant(s) Notes Reviewed:  [ ] YES  [ ] NO:   Care Discussed with Consultants/Other Providers [ ] YES  [ ] Dr. Lidia Rutledge, PGY1  Pager [phone]    TASIA WARE  37y  MRN: [id number]    Subjective:  Patient is a 37y old  Male who presents with a chief complaint of Fall & seizure at home (06 Nov 2018 09:06)      Overnight:   No acute events. Patient non-verbal at baseline but appears to be resting comfortably. No acute signs of distress.      MEDICATIONS  (STANDING):  ARIPiprazole 5 milliGRAM(s) Oral daily  atorvastatin 20 milliGRAM(s) Oral at bedtime  baclofen 10 milliGRAM(s) Oral two times a day  carBAMazepine XR Tablet 200 milliGRAM(s) Oral <User Schedule>  enoxaparin Injectable 40 milliGRAM(s) SubCutaneous every 24 hours  pantoprazole    Tablet 40 milliGRAM(s) Oral before breakfast  topiramate 200 milliGRAM(s) Oral every 12 hours    MEDICATIONS  (PRN):        Objective:  Vitals: Vital Signs Last 24 Hrs  T(C): 37.2 (11-06-18 @ 06:20), Max: 37.6 (11-05-18 @ 21:30)  T(F): 98.9 (11-06-18 @ 06:20), Max: 99.6 (11-05-18 @ 21:30)  HR: 84 (11-06-18 @ 06:20) (84 - 87)  BP: 114/72 (11-06-18 @ 06:20) (109/71 - 120/78)  BP(mean): --  RR: 18 (11-06-18 @ 06:20) (17 - 18)  SpO2: 97% (11-06-18 @ 06:20) (96% - 100%)                I&O's Summary    05 Nov 2018 07:01  -  06 Nov 2018 07:00  --------------------------------------------------------  IN: 120 mL / OUT: 300 mL / NET: -180 mL        PHYSICAL EXAM:  GENERAL: NAD, fairly groomed  HEAD:  Atraumatic, Normocephalic  EYES: EOMI, PERRLA, conjunctiva and sclera clear  ENMT: No tonsillar erythema, exudates, or enlargement; Moist mucous membranes, Good dentition, No lesions  NECK: Supple, No JVD, Normal thyroid, full ROM  CHEST/LUNG: Clear to auscultation bilaterally; No rales, rhonchi, wheezing, or rubs  HEART: Regular rate and rhythm; No murmurs, rubs, or gallops  ABDOMEN: Soft, Nontender, Nondistended; Bowel sounds present  EXTREMITIES:  2+ Peripheral Pulses, No clubbing, cyanosis, or edema  LYMPH: No lymphadenopathy noted  SKIN: No rashes or lesions  NERVOUS SYSTEM:  Alert; b/l upper extremity contractures                                               LABS:  11-06    131<L>  |  96<L>  |  13  ----------------------------<  85  3.3<L>   |  17<L>  |  0.77  11-05    129<L>  |  96<L>  |  8   ----------------------------<  82  3.6   |  16<L>  |  0.68  11-04    128<L>  |  98  |  7   ----------------------------<  90  3.7   |  17<L>  |  0.70    Ca    9.0      06 Nov 2018 05:10  Ca    8.9      05 Nov 2018 05:30  Ca    8.6      04 Nov 2018 05:21  Phos  3.9     11-06  Mg     1.9     11-06    TPro  7.0  /  Alb  3.6  /  TBili  0.5  /  DBili  x   /  AST  20  /  ALT  15  /  AlkPhos  67  11-04  TPro  8.3  /  Alb  4.3  /  TBili  0.4  /  DBili  x   /  AST  37  /  ALT  19  /  AlkPhos  72  11-03                                          12.9   5.87  )-----------( 171      ( 05 Nov 2018 05:30 )             36.7                         11.5   4.99  )-----------( 161      ( 04 Nov 2018 05:21 )             33.0                         13.5   5.72  )-----------( 186      ( 03 Nov 2018 12:50 )             38.1           RADIOLOGY & ADDITIONAL TESTS:  EEG Classification / Summary:  Abnormal EEG Study, awake   -Left frontotemporal slowing  -Moderate diffuse slowing  Clinical Impression:  Moderate multifocal cerebral dysfunction, persistently worse in the left fronotemporal region.  There were no epileptiform abnormalities recorded.    f/u results of Video EEG      Imaging Personally Reviewed:  [ ] YES  [ ] NO      Consultants involved in case:   NEUROLOGY:  Suggest  1. start Vimpat - give 200mg IV x 1 then 150 mg q12 po; DC carbamazepine. Continue topiramate.   2. continue vEEG during AED transition.  3. OK for discharge if stable on Vimpat x 24h       Consultant(s) Notes Reviewed:  [x] YES  [ ] NO:   Care Discussed with Consultants/Other Providers [ ] YES  [ ]

## 2018-11-07 ENCOUNTER — TRANSCRIPTION ENCOUNTER (OUTPATIENT)
Age: 38
End: 2018-11-07

## 2018-11-07 DIAGNOSIS — E87.8 OTHER DISORDERS OF ELECTROLYTE AND FLUID BALANCE, NOT ELSEWHERE CLASSIFIED: ICD-10-CM

## 2018-11-07 LAB
BUN SERPL-MCNC: 15 MG/DL — SIGNIFICANT CHANGE UP (ref 7–23)
CALCIUM SERPL-MCNC: 9.3 MG/DL — SIGNIFICANT CHANGE UP (ref 8.4–10.5)
CHLORIDE SERPL-SCNC: 98 MMOL/L — SIGNIFICANT CHANGE UP (ref 98–107)
CO2 SERPL-SCNC: 18 MMOL/L — LOW (ref 22–31)
CREAT SERPL-MCNC: 0.78 MG/DL — SIGNIFICANT CHANGE UP (ref 0.5–1.3)
GLUCOSE SERPL-MCNC: 85 MG/DL — SIGNIFICANT CHANGE UP (ref 70–99)
MAGNESIUM SERPL-MCNC: 2 MG/DL — SIGNIFICANT CHANGE UP (ref 1.6–2.6)
PHOSPHATE SERPL-MCNC: 4 MG/DL — SIGNIFICANT CHANGE UP (ref 2.5–4.5)
POTASSIUM SERPL-MCNC: 3.3 MMOL/L — LOW (ref 3.5–5.3)
POTASSIUM SERPL-SCNC: 3.3 MMOL/L — LOW (ref 3.5–5.3)
SODIUM SERPL-SCNC: 134 MMOL/L — LOW (ref 135–145)

## 2018-11-07 PROCEDURE — 99232 SBSQ HOSP IP/OBS MODERATE 35: CPT | Mod: GC

## 2018-11-07 PROCEDURE — 95951: CPT | Mod: 26

## 2018-11-07 RX ORDER — CARBAMAZEPINE 200 MG
1 TABLET ORAL
Qty: 0 | Refills: 0 | COMMUNITY

## 2018-11-07 RX ORDER — POTASSIUM CHLORIDE 20 MEQ
40 PACKET (EA) ORAL ONCE
Qty: 0 | Refills: 0 | Status: COMPLETED | OUTPATIENT
Start: 2018-11-07 | End: 2018-11-07

## 2018-11-07 RX ORDER — LACOSAMIDE 50 MG/1
1 TABLET ORAL
Qty: 60 | Refills: 0 | OUTPATIENT
Start: 2018-11-07 | End: 2018-12-06

## 2018-11-07 RX ADMIN — Medication 40 MILLIEQUIVALENT(S): at 07:49

## 2018-11-07 RX ADMIN — LACOSAMIDE 150 MILLIGRAM(S): 50 TABLET ORAL at 17:32

## 2018-11-07 RX ADMIN — Medication 200 MILLIGRAM(S): at 05:53

## 2018-11-07 RX ADMIN — Medication 10 MILLIGRAM(S): at 17:32

## 2018-11-07 RX ADMIN — ENOXAPARIN SODIUM 40 MILLIGRAM(S): 100 INJECTION SUBCUTANEOUS at 23:52

## 2018-11-07 RX ADMIN — Medication 10 MILLIGRAM(S): at 05:52

## 2018-11-07 RX ADMIN — PANTOPRAZOLE SODIUM 40 MILLIGRAM(S): 20 TABLET, DELAYED RELEASE ORAL at 05:53

## 2018-11-07 RX ADMIN — ARIPIPRAZOLE 5 MILLIGRAM(S): 15 TABLET ORAL at 12:48

## 2018-11-07 RX ADMIN — ATORVASTATIN CALCIUM 20 MILLIGRAM(S): 80 TABLET, FILM COATED ORAL at 21:38

## 2018-11-07 RX ADMIN — LACOSAMIDE 150 MILLIGRAM(S): 50 TABLET ORAL at 05:53

## 2018-11-07 RX ADMIN — Medication 200 MILLIGRAM(S): at 17:32

## 2018-11-07 NOTE — DISCHARGE NOTE ADULT - ADDITIONAL INSTRUCTIONS
Please follow up with your primary care physician within 1-2 weeks of discharge. It is absolutely essential that you begin to see a neurologist to manage your anti-epileptic medications, and you may follow up with Dr. Uribe Please follow up with your primary care physician within 1-2 weeks of discharge.   It is absolutely essential that you begin to see a neurologist to manage your anti-epileptic medications. Please follow up with neurologist Dr. Walsh within 2 weeks of discharge.

## 2018-11-07 NOTE — DISCHARGE NOTE ADULT - MEDICATION SUMMARY - MEDICATIONS TO STOP TAKING
I will STOP taking the medications listed below when I get home from the hospital:    carBAMazepine 200 mg oral capsule, extended release  -- 1 cap(s) by mouth 3 times a day

## 2018-11-07 NOTE — PROGRESS NOTE ADULT - PROBLEM SELECTOR PLAN 4
Contacted patient's pharmacy, [facility], able to complete medication reconciliation  - anti-epileptic medications managed by patient's PCP per family report, as pt has not been seeing a Neurologist Stable decrease in HCO3 (CO2 18, 17, 16) with Unclear etiology at this time.   - No current evidence of renal dysfunction or GI losses.   - Possible SE of topiramate.   - possible ?RTA

## 2018-11-07 NOTE — DISCHARGE NOTE ADULT - CARE PLAN
Principal Discharge DX:	Fall at home  Goal:	Prevention of further falls  Assessment and plan of treatment:	You were admitted to the hospital after falling at home.  Secondary Diagnosis:	Seizure  Secondary Diagnosis:	Hyponatremia Principal Discharge DX:	Fall at home  Goal:	Prevention of further falls  Assessment and plan of treatment:	You were admitted to the hospital after falling at home. A CT scan of your head showed no acute problems. Physical therapy evaluated you and recommended that you continue with home physical therapy. If you fall again please seek urgent medical attention.  Secondary Diagnosis:	Seizure  Goal:	Return to baseline  Secondary Diagnosis:	Hyponatremia  Goal:	Normalization of ions/nutrients Principal Discharge DX:	Fall at home  Goal:	Prevention of further falls  Assessment and plan of treatment:	You were admitted to the hospital after falling at home. A CT scan of your head showed no acute problems. Physical therapy evaluated you and recommended that you continue with home physical therapy. If you fall again please seek urgent medical attention. Please follow up with your primary care physician within 1-2 weeks of discharge to discuss your recent hospitalization.  Secondary Diagnosis:	Seizure  Goal:	Return to baseline  Assessment and plan of treatment:	You have a history of seizures for which an examination called a video EEG was performed. You did not have any seizures while in the hospital. The hospital neurology team decided to continue your home medication Topiramate, but changed your other medication from Carbamazepine to Vimpat (Lacosamide). It is essential that you see a neurologist to manage your anti-epileptic medications in the future. You may follow up with neurologist Dr. Uribe who was seeing you in the hospital; please make an appointment with Dr. Uribe within 2 weeks of discharge.  Secondary Diagnosis:	Hyponatremia  Goal:	Normalization of ions/nutrients  Assessment and plan of treatment:	When you were admitted to the hospital, we found that your sodium levels were low. While in the hospital your fluid intake was restricted which caused your sodium level to normalize. This may have been due to your previous medication Carbamazepine which has been discontinued while you were here. Please follow up with your primary care physician within 1-2 weeks of discharge to discuss your recent hospitalization. If you experience high fevers, vomiting that will not stop, severe pain, chest pain, or sudden shortness of breath please seek urgent medical attention.

## 2018-11-07 NOTE — DISCHARGE NOTE ADULT - CARE PROVIDER_API CALL
Joe Walsh (MD), Clinical Neurophysiology; EEGEpilepsy; Neurology  [address] [address]  Phone: [phone]  Fax: [phone]

## 2018-11-07 NOTE — DISCHARGE NOTE ADULT - OTHER SIGNIFICANT FINDINGS
11/7/18 video EEG:  EEG Classification / Summary:  Abnormal EEG Study, awake   -Left frontotemporal slowing  -Moderate diffuse slowing, Intermittent 1.5hz high amplitude frontal delta   -Left breach effect  -----------------------------------------------------------------------------------------------------  Clinical Impression:  Moderate multifocal cerebral dysfunction, persistently worse in the left frontoemporal region.  Left frontal skull defect  There were no epileptiform abnormalities recorded, though persistent F7 artifacts

## 2018-11-07 NOTE — PROGRESS NOTE ADULT - PROBLEM SELECTOR PLAN 2
- Unclear etiology given lack of clinical history. Previous record with fall at home from seizure activities (similar to this admission)  - Continue to monitor while inpatient.  - Fall risk ordered.   - CTH negative. If worsening neuro status, will repeat imaging.  - f/u PT recs - Unclear etiology given lack of clinical history. Previous record with fall at home from seizure activities (similar to this admission)  - Continue to monitor while inpatient.  - Fall risk ordered.   - CTH negative. If worsening neuro status, will repeat imaging.  - PT saw pt but unable to participate due to vEEG and neuro status - Unclear etiology given lack of clinical history. Previous record with fall at home from seizure activities (similar to this admission)  - Continue to monitor while inpatient.  - Fall risk ordered.   - CTH negative. If worsening neuro status, will repeat imaging.  - PT saw pt and recommending home PT as patient is wheelchair bound at baseline - Unclear etiology given lack of clinical history. Previous record with fall at home from seizure activities (similar to this admission)  - Continue to monitor while inpatient.  - Fall risk ordered.   - CTH negative. If worsening neuro status, will repeat imaging.  - PT following. Will f/u official recs. However, pt wheelchair bound at baseline

## 2018-11-07 NOTE — DISCHARGE NOTE ADULT - PLAN OF CARE
Prevention of further falls You were admitted to the hospital after falling at home. You were admitted to the hospital after falling at home. A CT scan of your head showed no acute problems. Physical therapy evaluated you and recommended that you continue with home physical therapy. If you fall again please seek urgent medical attention. Return to baseline Normalization of ions/nutrients You were admitted to the hospital after falling at home. A CT scan of your head showed no acute problems. Physical therapy evaluated you and recommended that you continue with home physical therapy. If you fall again please seek urgent medical attention. Please follow up with your primary care physician within 1-2 weeks of discharge to discuss your recent hospitalization. You have a history of seizures for which an examination called a video EEG was performed. You did not have any seizures while in the hospital. The hospital neurology team decided to continue your home medication Topiramate, but changed your other medication from Carbamazepine to Vimpat (Lacosamide). It is essential that you see a neurologist to manage your anti-epileptic medications in the future. You may follow up with neurologist Dr. Uribe who was seeing you in the hospital; please make an appointment with Dr. Uribe within 2 weeks of discharge. When you were admitted to the hospital, we found that your sodium levels were low. While in the hospital your fluid intake was restricted which caused your sodium level to normalize. This may have been due to your previous medication Carbamazepine which has been discontinued while you were here. Please follow up with your primary care physician within 1-2 weeks of discharge to discuss your recent hospitalization. If you experience high fevers, vomiting that will not stop, severe pain, chest pain, or sudden shortness of breath please seek urgent medical attention.

## 2018-11-07 NOTE — DISCHARGE NOTE ADULT - HOSPITAL COURSE
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 Patient baseline minimally verbal. Tried to call primary care his mother and nobody answered. Per ED notes and previous record, at baseline the pt has 1 seizure every 3-4 months. Patient woke up this AM looking "unwell" and pt was c/o dizziness.  At around 8-9am patient fell in the "washroom" hitting the right side of his head. No LOC was noticed. Pt then had a seizure at around 10 am as per mother for about "5-6 min" and then a second seizure at 12 pm. EMS was called subsequently. Otherwise, patient does not have fever, chills, cp, sob at home.    While in the hospital patient had no episodes of seizures. Neurology was following and changed patient's AED medications from Carbamazepine to Lacosamide (Vimpat), observing a video EEG for the first 24hr on the new AED regimen. Patient had not been seeing a neurologist but rather his PCP was managing his AED medications, so was advised strongly to see a neurologist after discharge.     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 The hyponatremia resolved almost entirely throughout admission from Na 126 at admission to 134 at discharge. 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 Patient baseline minimally verbal. Tried to call primary care his mother and nobody answered. Per ED notes and previous record, at baseline the pt has 1 seizure every 3-4 months. Patient woke up this AM looking "unwell" and pt was c/o dizziness.  At around 8-9am patient fell in the "washroom" hitting the right side of his head. No LOC was noticed. Pt then had a seizure at around 10 am as per mother for about "5-6 min" and then a second seizure at 12 pm. EMS was called subsequently. Otherwise, patient does not have fever, chills, cp, sob at home.    While in the hospital patient had no episodes of seizures. Neurology was following and changed patient's AED medications from Carbamazepine to Lacosamide (Vimpat), observing a video EEG for the first 24hr on the new AED regimen. Patient had not been seeing a neurologist but rather his PCP was managing his AED medications, so was advised strongly to see a neurologist after discharge.     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 The hyponatremia resolved almost entirely throughout admission from Na 126 at admission to 134 at discharge. 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 Patient baseline minimally verbal. Tried to call primary care his mother and nobody answered. Per ED notes and previous record, at baseline the pt has 1 seizure every 3-4 months. Patient woke up this AM looking "unwell" and pt was c/o dizziness.  At around 8-9am patient fell in the "washroom" hitting the right side of his head. No LOC was noticed. Pt then had a seizure at around 10 am as per mother for about "5-6 min" and then a second seizure at 12 pm. EMS was called subsequently. Otherwise, patient does not have fever, chills, cp, sob at home.    While in the hospital patient had no episodes of seizures. Neurology was following and changed patient's AED medications from Carbamazepine to Lacosamide (Vimpat), observing a video EEG for the first 24hr on the new AED regimen. Patient had not been seeing a neurologist but rather his PCP was managing his AED medications, so was advised strongly to see a neurologist after discharge.     Patient given Vimpat at discharge which is a controlled substance, iSTOP reference #[id number]  On admission patient was found to be hyponatremic, likely due to SIADH in the context of Carbamazepine use, and was started on fluid restriction to 1000mL/day. The hyponatremia resolved almost entirely throughout admission from Na 126 at admission to 134 at discharge with stopping the carbamazepine.

## 2018-11-07 NOTE — PROGRESS NOTE ADULT - PROBLEM SELECTOR PLAN 5
- Lovenox SQ dvt ppx  - Regular diet ordered. Contacted patient's pharmacy, [facility], able to complete medication reconciliation  - anti-epileptic medications managed by patient's PCP per family report, as pt has not been seeing a Neurologist

## 2018-11-07 NOTE — DISCHARGE NOTE ADULT - CONDITION (STATED IN TERMS THAT PERMIT A SPECIFIC MEASURABLE COMPARISON WITH CONDITION ON ADMISSION):
On today the day of discharge patient is hemodynamically and medically stable for discharge to home.

## 2018-11-07 NOTE — DISCHARGE NOTE ADULT - MEDICATION SUMMARY - MEDICATIONS TO TAKE
I will START or STAY ON the medications listed below when I get home from the hospital:    topiramate 200 mg oral tablet  -- 1 tab(s) by mouth every 12 hours  -- Indication: For Seizure    lacosamide 150 mg oral tablet  -- 1 tab(s) by mouth 2 times a day MDD:300  -- Indication: For Seizure    atorvastatin 20 mg oral tablet  -- 1 tab(s) by mouth once a day (at bedtime)  -- Indication: For Hyperlipidemia    Abilify 5 mg oral tablet  -- 1 tab(s) by mouth once a day  -- Indication: For Anxiety    baclofen 10 mg oral tablet  -- 1 tab(s) by mouth 2 times a day  -- Indication: For muscle relaxant    omeprazole 20 mg oral delayed release capsule  -- 1 cap(s) by mouth once a day  -- Indication: For reflux

## 2018-11-07 NOTE — DISCHARGE NOTE ADULT - PATIENT PORTAL LINK FT
You can access the [facility], offered by [facility], by registering with the following website: [email or web address]

## 2018-11-07 NOTE — DISCHARGE NOTE ADULT - CONDITIONS AT DISCHARGE
This Patient is stable , improved and discharged ; total assistance for all ADL; Right side paralysis ; Skin is intact, and all Instructions are sent with the patient [ no Family at bedside]

## 2018-11-07 NOTE — EEG REPORT - NS EEG TEXT BOX
TASIA WARE [id number]     Study Date: 		11-05-18    ROUTINE EEG    Technical Information:			  		  Placement and Labeling of Electrodes:  The EEG was performed utilizing 20 channels referential EEG connections (coronal over temporal over parasagittal montage) using all standard 10-20 electrode placements with EKG.  Recording was at a sampling rate of 256 samples per second per channel.  Time synchronized digital video recording was done simultaneously with EEG recording.  A low light infrared camera was used for low light recording.  Josr and seizure detection algorithms were utilized.    CSA Technical Component:  Quantitative EEG analysis using a separate Compressed Spectral Array (CSA) software package was conducted in real-time and run at bedside after set up by the technician, digitally displaying the power of electrographic frequencies included in the 1-30Hz band using a graded color map.  This data was reviewed and interpreted independently, and is reported in a separate section below.    --------------------------------------------------------------------------------------------------  History:  CC/ FRANKLIN Patient is a 37y old  Male who presents with a chief complaint of Fall & seizure at home (05 Nov 2018 09:34)    MEDICATIONS  (STANDING):  ARIPiprazole 5 milliGRAM(s) Oral daily  atorvastatin 20 milliGRAM(s) Oral at bedtime  baclofen 10 milliGRAM(s) Oral two times a day  carBAMazepine XR Tablet 200 milliGRAM(s) Oral <User Schedule>  enoxaparin Injectable 40 milliGRAM(s) SubCutaneous every 24 hours  pantoprazole    Tablet 40 milliGRAM(s) Oral before breakfast  topiramate 200 milliGRAM(s) Oral every 12 hours    --------------------------------------------------------------------------------------------------  Study Interpretation:    FINDINGS:  The background was continuous, spontaneously variable and reactive.  During wakefulness, the posteriorly dominant rhythm consisted of symmetric, 7.5Hz activity, with an amplitude to 30 uV, that attenuated to eye opening.      Background Slowing:  Generalized slowing: irregular theta was present.  Focal slowing: left frontotemporal irregular delta was present.    Sleep Background:  Stage II sleep transients were not recorded.    Epileptiform Activity:   No epileptiform discharges were present.    Events:  No clinical events were recorded.  No seizures were recorded.    Activation Procedures:   -Hyperventilation was not performed.    -Photic stimulation was not performed.    Artifacts:  Intermittent myogenic and movement artifacts were noted.    ECG:  The heart rate on single channel ECG was predominantly between 70-80BPM.  -----------------------------------------------------------------------------------------------------    EEG Classification / Summary:  Abnormal EEG Study, awake   -Left frontotemporal slowing  -Moderate diffuse slowing  -----------------------------------------------------------------------------------------------------    Clinical Impression:  Moderate multifocal cerebral dysfunction, persistently worse in the left fronotemporal region.  There were no epileptiform abnormalities recorded.      -------------------------------------------------------------------------------------------------------  Jaspreet Valadez M.D.   of Neurology, [facility]ile[facility] [place]
TASIA WARE [id number]     VEEG Study Date: 		11-06 to 11-7-18        --------------------------------------------------------------------------------------------------  History:  CC/ HPI Patient is a 37y old  Male who presents with a chief complaint of Fall & seizure at home (05 Nov 2018 09:34)    MEDICATIONS  (STANDING):  ARIPiprazole 5 milliGRAM(s) Oral daily  atorvastatin 20 milliGRAM(s) Oral at bedtime  baclofen 10 milliGRAM(s) Oral two times a day  carBAMazepine XR Tablet 200 milliGRAM(s) Oral <User Schedule>  enoxaparin Injectable 40 milliGRAM(s) SubCutaneous every 24 hours  pantoprazole    Tablet 40 milliGRAM(s) Oral before breakfast  topiramate 200 milliGRAM(s) Oral every 12 hours    --------------------------------------------------------------------------------------------------  Study Interpretation:    FINDINGS:  The background was continuous, spontaneously variable and reactive.  During wakefulness, the posteriorly dominant rhythm consisted of symmetric, 7.5Hz activity, with an amplitude to 30 uV, that attenuated to eye opening.  Accentuation of fast activity at C3    Background Slowing:  Generalized slowing: irregular theta was present.  Focal slowing: left frontotemporal irregular delta was present.  Intermittent 1.5hz high amplitude frontal delta     Sleep Background:  Stage II sleep transients were recorded better formed over the right.    Epileptiform Activity:   No epileptiform discharges were present.    Events:  No clinical events were recorded.  No seizures were recorded.    Activation Procedures:   -Hyperventilation was not performed.    -Photic stimulation was not performed.    Artifacts:  Intermittent myogenic and movement artifacts were noted.  Persistent F7 artifacts    ECG:  The heart rate on single channel ECG was predominantly between 70-80BPM.  -----------------------------------------------------------------------------------------------------    EEG Classification / Summary:  Abnormal EEG Study, awake   -Left frontotemporal slowing  -Moderate diffuse slowing, Intermittent 1.5hz high amplitude frontal delta   -Left breach effect  -----------------------------------------------------------------------------------------------------    Clinical Impression:  Moderate multifocal cerebral dysfunction, persistently worse in the left frontoemporal region.  Left frontal skull defect  There were no epileptiform abnormalities recorded, though persistent F7 artifacts    -------------------------------------------------------------------------------------------------------  Jaspreet Valadez M.D.   of Neurology, [facility] Epilepsy [place]
TASIA WARE [id number]     VEEG Study Date: 		11-05 to 11-6-18        --------------------------------------------------------------------------------------------------  History:  CC/ HPI Patient is a 37y old  Male who presents with a chief complaint of Fall & seizure at home (05 Nov 2018 09:34)    MEDICATIONS  (STANDING):  ARIPiprazole 5 milliGRAM(s) Oral daily  atorvastatin 20 milliGRAM(s) Oral at bedtime  baclofen 10 milliGRAM(s) Oral two times a day  carBAMazepine XR Tablet 200 milliGRAM(s) Oral <User Schedule>  enoxaparin Injectable 40 milliGRAM(s) SubCutaneous every 24 hours  pantoprazole    Tablet 40 milliGRAM(s) Oral before breakfast  topiramate 200 milliGRAM(s) Oral every 12 hours    --------------------------------------------------------------------------------------------------  Study Interpretation:    FINDINGS:  The background was continuous, spontaneously variable and reactive.  During wakefulness, the posteriorly dominant rhythm consisted of symmetric, 7.5Hz activity, with an amplitude to 30 uV, that attenuated to eye opening.  Accentuation of fast activity at C3    Background Slowing:  Generalized slowing: irregular theta was present.  Focal slowing: left frontotemporal irregular delta was present.    Sleep Background:  Stage II sleep transients were recorded better formed over the right.    Epileptiform Activity:   No epileptiform discharges were present.    Events:  No clinical events were recorded.  No seizures were recorded.    Activation Procedures:   -Hyperventilation was not performed.    -Photic stimulation was not performed.    Artifacts:  Intermittent myogenic and movement artifacts were noted.  F7 issues    ECG:  The heart rate on single channel ECG was predominantly between 70-80BPM.  -----------------------------------------------------------------------------------------------------    EEG Classification / Summary:  Abnormal EEG Study, awake   -Left frontotemporal slowing  -Moderate diffuse slowing  -Left breach effect  -----------------------------------------------------------------------------------------------------    Clinical Impression:  Moderate multifocal cerebral dysfunction, persistently worse in the left frontoemporal region.  Left frontal skull defect  There were no epileptiform abnormalities recorded.      -------------------------------------------------------------------------------------------------------  Jaspreet Valadez M.D.   of Neurology, [facility]ilepsy [place]

## 2018-11-07 NOTE — PROGRESS NOTE ADULT - ASSESSMENT
36 y/o male, total care dependent, with h/o TBI at the age of 4, epilepsy at the age of 13, multiple hospitalizations 2/2 seizures presenting with seizure and fall at home, with CTH unchanged from previous study, now changing anti-seizure medications and following results of video EEG

## 2018-11-07 NOTE — PROGRESS NOTE ADULT - SUBJECTIVE AND OBJECTIVE BOX
Dr. Lidia Rutledge, PGY1  Pager [phone]    TASIA WARE  37y  MRN: [id number]    Subjective:  Patient is a 37y old  Male who presents with a chief complaint of Fall & seizure at home (06 Nov 2018 09:13)      Overnight:       MEDICATIONS  (STANDING):  ARIPiprazole 5 milliGRAM(s) Oral daily  atorvastatin 20 milliGRAM(s) Oral at bedtime  baclofen 10 milliGRAM(s) Oral two times a day  enoxaparin Injectable 40 milliGRAM(s) SubCutaneous every 24 hours  lacosamide 150 milliGRAM(s) Oral two times a day  pantoprazole    Tablet 40 milliGRAM(s) Oral before breakfast  topiramate 200 milliGRAM(s) Oral every 12 hours    MEDICATIONS  (PRN):        Objective:  Vitals: Vital Signs Last 24 Hrs  T(C): 36.7 (11-07-18 @ 05:58), Max: 37.4 (11-06-18 @ 21:22)  T(F): 98 (11-07-18 @ 05:58), Max: 99.3 (11-06-18 @ 21:22)  HR: 76 (11-07-18 @ 05:58) (76 - 85)  BP: 118/80 (11-07-18 @ 05:58) (107/76 - 122/78)  BP(mean): --  RR: 18 (11-07-18 @ 05:58) (18 - 26)  SpO2: 100% (11-07-18 @ 05:58) (97% - 100%)                I&O's Summary      PHYSICAL EXAM:  GENERAL: NAD, fairly groomed  HEAD:  Atraumatic, Normocephalic  EYES: EOMI, PERRLA, conjunctiva and sclera clear  ENMT: No tonsillar erythema, exudates, or enlargement; Moist mucous membranes, Good dentition, No lesions  NECK: Supple, No JVD, Normal thyroid, full ROM  CHEST/LUNG: Clear to auscultation bilaterally; No rales, rhonchi, wheezing, or rubs  HEART: Regular rate and rhythm; No murmurs, rubs, or gallops  ABDOMEN: Soft, Nontender, Nondistended; Bowel sounds present  EXTREMITIES:  2+ Peripheral Pulses, No clubbing, cyanosis, or edema  LYMPH: No lymphadenopathy noted  SKIN: No rashes or lesions  NERVOUS SYSTEM:  Alert; b/l upper extremity contractures                                              LABS:  11-07    134<L>  |  98  |  15  ----------------------------<  85  3.3<L>   |  18<L>  |  0.78  11-06    131<L>  |  96<L>  |  13  ----------------------------<  85  3.3<L>   |  17<L>  |  0.77  11-05    129<L>  |  96<L>  |  8   ----------------------------<  82  3.6   |  16<L>  |  0.68    Ca    9.3      07 Nov 2018 05:20  Ca    9.0      06 Nov 2018 05:10  Ca    8.9      05 Nov 2018 05:30  Phos  4.0     11-07  Mg     2.0     11-07                                        12.9   5.87  )-----------( 171      ( 05 Nov 2018 05:30 )             36.7       RADIOLOGY & ADDITIONAL TESTS:  EEG Classification / Summary:  Abnormal EEG Study, awake   -Left frontotemporal slowing  -Moderate diffuse slowing  Clinical Impression:  Moderate multifocal cerebral dysfunction, persistently worse in the left fronotemporal region.  There were no epileptiform abnormalities recorded.    f/u results of Video EEG    Imaging Personally Reviewed:  [ ] YES  [ ] NO      Consultants involved in case:   PHYSICAL THERAPY:  Dispo recs TBD pending completion of mobility assessment    NUTRITION:  1. Continue Regular diet. Fluid Restriction per medical team discretion. 2. Continue Ensure Enlive 240mls 2x daily (700kcal, 40g protein). 3. Consider multivitamin daily for micronutrient coverage. 4. Please Encourage po intake, assist with meals and menu selections, provide alternatives PRN. 5. Monitor weights, labs, BM's, skin integrity, p.o. intake.    NEUROLOGY:  1. start Vimpat - give 200mg IV x 1 then 150 mg q12 po; DC carbamazepine. Continue topiramate.   2. continue vEEG during AED transition.  3. OK for discharge if stable on Vimpat x 24h     Consultant(s) Notes Reviewed:  [ ] YES  [ ] NO:   Care Discussed with Consultants/Other Providers [ ] YES  [ ] Dr. Lidia Rutledge, PGY1  Pager [phone]    TASIA WARE  37y  MRN: [id number]    Subjective:  Patient is a 37y old  Male who presents with a chief complaint of Fall & seizure at home (06 Nov 2018 09:13)      Overnight:   No acute events. This morning patient resting comfortably in bed.      MEDICATIONS  (STANDING):  ARIPiprazole 5 milliGRAM(s) Oral daily  atorvastatin 20 milliGRAM(s) Oral at bedtime  baclofen 10 milliGRAM(s) Oral two times a day  enoxaparin Injectable 40 milliGRAM(s) SubCutaneous every 24 hours  lacosamide 150 milliGRAM(s) Oral two times a day  pantoprazole    Tablet 40 milliGRAM(s) Oral before breakfast  topiramate 200 milliGRAM(s) Oral every 12 hours    MEDICATIONS  (PRN):        Objective:  Vitals: Vital Signs Last 24 Hrs  T(C): 36.7 (11-07-18 @ 05:58), Max: 37.4 (11-06-18 @ 21:22)  T(F): 98 (11-07-18 @ 05:58), Max: 99.3 (11-06-18 @ 21:22)  HR: 76 (11-07-18 @ 05:58) (76 - 85)  BP: 118/80 (11-07-18 @ 05:58) (107/76 - 122/78)  BP(mean): --  RR: 18 (11-07-18 @ 05:58) (18 - 26)  SpO2: 100% (11-07-18 @ 05:58) (97% - 100%)                I&O's Summary      PHYSICAL EXAM:  GENERAL: NAD, fairly groomed  HEAD:  Atraumatic, Normocephalic  EYES: EOMI, PERRLA, conjunctiva and sclera clear  ENMT: No tonsillar erythema, exudates, or enlargement; Moist mucous membranes, Good dentition, No lesions  NECK: Supple, No JVD, Normal thyroid, full ROM  CHEST/LUNG: Clear to auscultation bilaterally; No rales, rhonchi, wheezing, or rubs  HEART: Regular rate and rhythm; No murmurs, rubs, or gallops  ABDOMEN: Soft, Nontender, Nondistended; Bowel sounds present  EXTREMITIES:  2+ Peripheral Pulses, No clubbing, cyanosis, or edema  LYMPH: No lymphadenopathy noted  SKIN: No rashes or lesions  NERVOUS SYSTEM:  Alert; b/l upper extremity contractures                                              LABS:  11-07    134<L>  |  98  |  15  ----------------------------<  85  3.3<L>   |  18<L>  |  0.78  11-06    131<L>  |  96<L>  |  13  ----------------------------<  85  3.3<L>   |  17<L>  |  0.77  11-05    129<L>  |  96<L>  |  8   ----------------------------<  82  3.6   |  16<L>  |  0.68    Ca    9.3      07 Nov 2018 05:20  Ca    9.0      06 Nov 2018 05:10  Ca    8.9      05 Nov 2018 05:30  Phos  4.0     11-07  Mg     2.0     11-07                                        12.9   5.87  )-----------( 171      ( 05 Nov 2018 05:30 )             36.7       RADIOLOGY & ADDITIONAL TESTS:  EEG Classification / Summary:  Abnormal EEG Study, awake   -Left frontotemporal slowing  -Moderate diffuse slowing  Clinical Impression:  Moderate multifocal cerebral dysfunction, persistently worse in the left fronotemporal region.  There were no epileptiform abnormalities recorded.    f/u results of Video EEG    Imaging Personally Reviewed:  [ ] YES  [ ] NO      Consultants involved in case:   PHYSICAL THERAPY:  Dispo recs TBD pending completion of mobility assessment    NUTRITION:  1. Continue Regular diet. Fluid Restriction per medical team discretion. 2. Continue Ensure Enlive 240mls 2x daily (700kcal, 40g protein). 3. Consider multivitamin daily for micronutrient coverage. 4. Please Encourage po intake, assist with meals and menu selections, provide alternatives PRN. 5. Monitor weights, labs, BM's, skin integrity, p.o. intake.    NEUROLOGY:  1. start Vimpat - give 200mg IV x 1 then 150 mg q12 po; DC carbamazepine. Continue topiramate.   2. continue vEEG during AED transition.  3. OK for discharge if stable on Vimpat x 24h     Consultant(s) Notes Reviewed:  [ ] YES  [ ] NO:   Care Discussed with Consultants/Other Providers [ ] YES  [ ]

## 2018-11-07 NOTE — PROGRESS NOTE ADULT - PROBLEM SELECTOR PLAN 1
Likely due to recent mild head trauma due to a fall;   - CT head unchanged  - d/c Carbamazepine 200mg TID  - c/w Topiramate 200mg PO Q12h  - Neuro consulted, recommend start Vimpat 200mg IV x 1 then 150 mg q12 po; DC carbamazepine. Continue topiramate, continue vEEG during AED transition  - 24hr video EEG started afternoon 11/5, f/u results  - Frequent neuro status checks Likely due to recent mild head trauma due to a fall;   - CT head unchanged  - s/p Carbamazepine 200mg TID  - c/w Topiramate 200mg PO Q12h  - per Neuro recs, c/w Vimpat 150 mg q12 po, continue vEEG during AED transition  - 24hr video EEG started afternoon 11/5, f/u results  - Frequent neuro status checks Likely due to recent mild head trauma due to a fall. Hyponatremia may have also decreased his threshold   - CT head unchanged  - s/p Carbamazepine 200mg TID, now dcd on 11/6  - c/w Topiramate 200mg PO Q12h  - per Neuro recs, c/w Vimpat 150 mg q12 po, continue vEEG during AED transition  - 24hr video EEG started afternoon 11/5, f/u results  - Frequent neuro status checks

## 2018-11-07 NOTE — PROGRESS NOTE ADULT - PROBLEM SELECTOR PLAN 3
- Mild, likely 2/2 SIADH vs. dehydration.  - patient has chronic hyponatremia   - fluid restriction to 1000mL/day due to SIADH (Uosm 544, Hue 119)  - discontinue Carbamazepine    Also w/ stable decrease in HCO3. Unclear etiology at this time. No current evidence of renal dysfunction or GI losses. Possible SE of topiramate. ?RTA - Mild, likely 2/2 SIADH vs. dehydration.  - patient has chronic hyponatremia   - fluid restriction to 1000mL/day due to SIADH (Uosm 544, Hue 119)  - discontinue Carbamazepine - Mild, likely 2/2 SIADH (may be 2/2 Carbamazepime SE) vs. dehydration.  - patient has chronic hyponatremia   - fluid restriction to 1000mL/day due to SIADH (Uosm 544, Hue 119)  - discontinued Carbamazepine - Mild, likely 2/2 SIADH (may be 2/2 Carbamazepime SE) vs. dehydration.  - fluid restriction to 1000mL/day due to SIADH (Uosm 544, Hue 119)  - discontinued Carbamazepine

## 2018-11-08 VITALS
DIASTOLIC BLOOD PRESSURE: 83 MMHG | HEART RATE: 79 BPM | RESPIRATION RATE: 18 BRPM | TEMPERATURE: 99 F | OXYGEN SATURATION: 99 % | SYSTOLIC BLOOD PRESSURE: 118 MMHG

## 2018-11-08 PROCEDURE — 99239 HOSP IP/OBS DSCHRG MGMT >30: CPT

## 2018-11-08 RX ADMIN — Medication 10 MILLIGRAM(S): at 06:18

## 2018-11-08 RX ADMIN — Medication 200 MILLIGRAM(S): at 06:18

## 2018-11-08 RX ADMIN — LACOSAMIDE 150 MILLIGRAM(S): 50 TABLET ORAL at 06:18

## 2018-11-08 RX ADMIN — ARIPIPRAZOLE 5 MILLIGRAM(S): 15 TABLET ORAL at 12:24

## 2018-11-08 RX ADMIN — PANTOPRAZOLE SODIUM 40 MILLIGRAM(S): 20 TABLET, DELAYED RELEASE ORAL at 06:18

## 2018-11-08 NOTE — PROGRESS NOTE ADULT - PROBLEM SELECTOR PLAN 2
- Unclear etiology given lack of clinical history. Previous record with fall at home from seizure activities (similar to this admission)  - Continue to monitor while inpatient.  - Fall risk ordered.   - CTH negative. If worsening neuro status, will repeat imaging.  - PT following. Will f/u official recs. However, pt wheelchair bound at baseline - Unclear etiology given lack of clinical history. Previous record with fall at home from seizure activities (similar to this admission)  - Continue to monitor while inpatient.  - Fall risk ordered.   - CTH negative. If worsening neuro status, will repeat imaging.  - PT saw pt. However, pt wheelchair bound at baseline

## 2018-11-08 NOTE — PROGRESS NOTE ADULT - ASSESSMENT
38 y/o male, total care dependent, with h/o TBI at the age of 4, epilepsy at the age of 13, multiple hospitalizations 2/2 seizures presenting with seizure and fall at home, with CTH unchanged from previous study. Changed anti-seizure medication from Carbamazepine to Vimpat (Lacosamide) with no seizure activity after 24hr on video EEG. Awaiting prior authorization for Vimpat from patient's insurance prior to discharge.

## 2018-11-08 NOTE — PROGRESS NOTE ADULT - SUBJECTIVE AND OBJECTIVE BOX
Dr. Lidia Rutledge, PGY1  Pager [phone]    TASIA WARE  37y  MRN: [id number]    Subjective:  Patient is a 37y old  Male who presents with a chief complaint of Fall & seizure at home (07 Nov 2018 11:38)      Overnight:   No acute events. Patient in no acute distress this morning, non-verbal at baseline.      MEDICATIONS  (STANDING):  ARIPiprazole 5 milliGRAM(s) Oral daily  atorvastatin 20 milliGRAM(s) Oral at bedtime  baclofen 10 milliGRAM(s) Oral two times a day  enoxaparin Injectable 40 milliGRAM(s) SubCutaneous every 24 hours  lacosamide 150 milliGRAM(s) Oral two times a day  pantoprazole    Tablet 40 milliGRAM(s) Oral before breakfast  topiramate 200 milliGRAM(s) Oral every 12 hours    MEDICATIONS  (PRN):        Objective:  Vitals: Vital Signs Last 24 Hrs  T(C): 36.9 (11-08-18 @ 06:21), Max: 36.9 (11-07-18 @ 13:58)  T(F): 98.5 (11-08-18 @ 06:21), Max: 98.5 (11-07-18 @ 13:58)  HR: 79 (11-08-18 @ 06:21) (79 - 88)  BP: 119/78 (11-08-18 @ 06:21) (116/77 - 122/90)  BP(mean): --  RR: 18 (11-08-18 @ 06:21) (17 - 18)  SpO2: 99% (11-08-18 @ 06:21) (99% - 100%)                I&O's Summary    08 Nov 2018 07:01  -  08 Nov 2018 10:30  --------------------------------------------------------  IN: 0 mL / OUT: 1 mL / NET: -1 mL      PHYSICAL EXAM:  GENERAL: NAD, fairly groomed  HEAD:  Atraumatic, Normocephalic  EYES: EOMI, PERRLA, conjunctiva and sclera clear  ENMT: No tonsillar erythema, exudates, or enlargement; Moist mucous membranes, Good dentition, No lesions  NECK: Supple, No JVD, Normal thyroid, full ROM  CHEST/LUNG: Clear to auscultation bilaterally; No rales, rhonchi, wheezing, or rubs  HEART: Regular rate and rhythm; No murmurs, rubs, or gallops  ABDOMEN: Soft, Nontender, Nondistended; Bowel sounds present  EXTREMITIES:  2+ Peripheral Pulses, No clubbing, cyanosis, or edema  LYMPH: No lymphadenopathy noted  SKIN: No rashes or lesions  NERVOUS SYSTEM:  Alert; b/l upper extremity contractures      LABS:  11-07    134<L>  |  98  |  15  ----------------------------<  85  3.3<L>   |  18<L>  |  0.78  11-06    131<L>  |  96<L>  |  13  ----------------------------<  85  3.3<L>   |  17<L>  |  0.77    Ca    9.3      07 Nov 2018 05:20  Ca    9.0      06 Nov 2018 05:10  Phos  4.0     11-07  Mg     2.0     11-07        RADIOLOGY & ADDITIONAL TESTS:  EEG 11/7:  EEG Classification / Summary:  Abnormal EEG Study, awake   -Left frontotemporal slowing  -Moderate diffuse slowing, Intermittent 1.5hz high amplitude frontal delta   -Left breach effect  -----------------------------------------------------------------------------------------------------  Clinical Impression:  Moderate multifocal cerebral dysfunction, persistently worse in the left frontoemporal region.  Left frontal skull defect  There were no epileptiform abnormalities recorded, though persistent F7 artifacts    Imaging Personally Reviewed:  [ ] YES  [ ] NO      Consultants involved in case:   Neurology:  1. start Vimpat - give 200mg IV x 1 then 150 mg q12 po; DC carbamazepine. Continue topiramate.   2. continue vEEG during AED transition.  3. OK for discharge if stable on Vimpat x 24h    Consultant(s) Notes Reviewed:  [x] YES  [ ] NO:   Care Discussed with Consultants/Other Providers [ ] YES  [ ]

## 2018-11-08 NOTE — PROGRESS NOTE ADULT - PROBLEM SELECTOR PLAN 5
Contacted patient's pharmacy, [facility], to complete medication reconciliation  - anti-epileptic medications managed by patient's PCP per family report, as pt has not been seeing a Neurologist  - will refer to neurology at discharge for management of AED's

## 2018-11-08 NOTE — PROGRESS NOTE ADULT - REASON FOR ADMISSION
Fall & seizure at home

## 2018-11-08 NOTE — PROGRESS NOTE ADULT - ATTENDING COMMENTS
- continue to monitor neuro exam   - VEEG report today personally reviewed and showing moderate multifocal cerebral dysfunction, persistently worse in the left frontoemporal region, w/o  epileptiform abnormalities recorded.    -spoke w/ Dr. Walsh from neuro at bedside this am. Current plan is to continue w/ current dose of topiramate. However, will  dc carbamazepine and start Vimpat as stated above. C/w 24 hr vEEG per neuro during this transition.   -Chronic hyponatremia c/w SIADH based on urine studies. Also possible SE of carbamazepine. Continue w/ fluid restriction for now. Carbamazepine switched today to vimpat which may help.   -D/c planning if patient remains stable on this regimen.
- continue to monitor neuro exam   -C/w current dose of topiramate and Vimpat. Vimpat auth now complete.   -D/c planning for today. Further details are outlined in discharge document. Spent 34 min in discharge planning and coordination.
- monitor neuro exam   - VEEG today. Will f/u results  -c/w current antiepileptics   -continue to appreciate neuro recs   -Chronic hyponatremia c/w SIADH based on urine studies. Also possible SE of carbamazepine. Continue w/ fluid restriction for now.
- continue to monitor neuro exam   - VEEG report today personally reviewed and again showing moderate multifocal cerebral dysfunction, persistently worse in the left frontoemporal region with no epileptiform abnormalities recorded.   -C/w current dose of topiramate. Carbamazepine dcd yesterday and Vimpat was started. C/w current dose of vimpat.  -will f/u w/ neuro today for any additional recs prior to discharge   -hyponatremia improving off carbamazepine and w/ fluid restriction.   -D/c planning for today. D/w SW/CM. Plan of care and f/u was d/w pts family. Further details are outlined in discharge document. Spent 34 min in discharge planning and coordination.

## 2018-11-08 NOTE — PROGRESS NOTE ADULT - PROBLEM SELECTOR PLAN 1
Likely due to recent mild head trauma due to a fall. Hyponatremia may have also decreased his threshold for seizure  - CT head unchanged  - s/p Carbamazepine 200mg TID, discontinued on 11/6  - c/w Topiramate 200mg PO Q12h  - per Neuro recs, c/w Vimpat 150 mg q12 po  - 24hr video EEG started 11/5, continued through AED transition with no epileptiform activity  - Frequent neuro status checks

## 2018-11-08 NOTE — PROGRESS NOTE ADULT - PROBLEM SELECTOR PLAN 4
Stable decrease in HCO3 (CO2 18, 17, 16) with unclear etiology at this time.   - No current evidence of renal dysfunction or GI losses.   - Possible SE of topiramate.   - possible ?RTA unclear etiology at this time. Improved on 11/7 labs   - No current evidence of renal dysfunction or GI losses.   - Possible SE of topiramate.   - possible ?RTA

## 2018-11-08 NOTE — PROGRESS NOTE ADULT - PROBLEM SELECTOR PLAN 3
- Mild, likely 2/2 SIADH (may be 2/2 Carbamazepime SE) vs. dehydration.  - fluid restriction to 1000mL/day due to SIADH (Uosm 544, Hue 119)  - discontinued Carbamazepine

## 2018-11-09 ENCOUNTER — INPATIENT (INPATIENT)
Facility: HOSPITAL | Age: 38
LOS: 5 days | Discharge: NURSING, REHAB OR LONG-TERM CARE | End: 2018-11-15
Attending: INTERNAL MEDICINE | Admitting: INTERNAL MEDICINE
Payer: MEDICAID

## 2018-11-09 VITALS
SYSTOLIC BLOOD PRESSURE: 126 MMHG | RESPIRATION RATE: 18 BRPM | DIASTOLIC BLOOD PRESSURE: 88 MMHG | HEART RATE: 102 BPM | OXYGEN SATURATION: 100 % | TEMPERATURE: 99 F
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
## 2018-11-09 DIAGNOSIS — I67.89 OTHER CEREBROVASCULAR DISEASE: ICD-10-CM

## 2018-11-09 DIAGNOSIS — Z29.9 ENCOUNTER FOR PROPHYLACTIC MEASURES, UNSPECIFIED: ICD-10-CM

## 2018-11-09 DIAGNOSIS — E78.00 PURE HYPERCHOLESTEROLEMIA, UNSPECIFIED: ICD-10-CM

## 2018-11-09 DIAGNOSIS — K59.00 CONSTIPATION, UNSPECIFIED: ICD-10-CM

## 2018-11-09 DIAGNOSIS — R42 DIZZINESS AND GIDDINESS: ICD-10-CM

## 2018-11-09 LAB
ALBUMIN SERPL ELPH-MCNC: 4.8 G/DL — SIGNIFICANT CHANGE UP (ref 3.3–5)
ALP SERPL-CCNC: 102 U/L — SIGNIFICANT CHANGE UP (ref 40–120)
ALT FLD-CCNC: 26 U/L — SIGNIFICANT CHANGE UP (ref 4–41)
APPEARANCE UR: CLEAR — SIGNIFICANT CHANGE UP
AST SERPL-CCNC: 34 U/L — SIGNIFICANT CHANGE UP (ref 4–40)
BASOPHILS # BLD AUTO: 0.05 K/UL — SIGNIFICANT CHANGE UP (ref 0–0.2)
BASOPHILS NFR BLD AUTO: 0.8 % — SIGNIFICANT CHANGE UP (ref 0–2)
BILIRUB SERPL-MCNC: 0.4 MG/DL — SIGNIFICANT CHANGE UP (ref 0.2–1.2)
BILIRUB UR-MCNC: SIGNIFICANT CHANGE UP
BLOOD UR QL VISUAL: NEGATIVE — SIGNIFICANT CHANGE UP
BUN SERPL-MCNC: 18 MG/DL — SIGNIFICANT CHANGE UP (ref 7–23)
CALCIUM SERPL-MCNC: 9.9 MG/DL — SIGNIFICANT CHANGE UP (ref 8.4–10.5)
CHLORIDE SERPL-SCNC: 100 MMOL/L — SIGNIFICANT CHANGE UP (ref 98–107)
CO2 SERPL-SCNC: 23 MMOL/L — SIGNIFICANT CHANGE UP (ref 22–31)
COLOR SPEC: SIGNIFICANT CHANGE UP
CREAT SERPL-MCNC: 0.87 MG/DL — SIGNIFICANT CHANGE UP (ref 0.5–1.3)
EOSINOPHIL # BLD AUTO: 0.25 K/UL — SIGNIFICANT CHANGE UP (ref 0–0.5)
EOSINOPHIL NFR BLD AUTO: 3.9 % — SIGNIFICANT CHANGE UP (ref 0–6)
GLUCOSE SERPL-MCNC: 101 MG/DL — HIGH (ref 70–99)
GLUCOSE UR-MCNC: NEGATIVE — SIGNIFICANT CHANGE UP
HCT VFR BLD CALC: 48.4 % — SIGNIFICANT CHANGE UP (ref 39–50)
HGB BLD-MCNC: 16.6 G/DL — SIGNIFICANT CHANGE UP (ref 13–17)
IMM GRANULOCYTES # BLD AUTO: 0.01 # — SIGNIFICANT CHANGE UP
IMM GRANULOCYTES NFR BLD AUTO: 0.2 % — SIGNIFICANT CHANGE UP (ref 0–1.5)
KETONES UR-MCNC: 15 — SIGNIFICANT CHANGE UP
LEUKOCYTE ESTERASE UR-ACNC: NEGATIVE — SIGNIFICANT CHANGE UP
LYMPHOCYTES # BLD AUTO: 1.83 K/UL — SIGNIFICANT CHANGE UP (ref 1–3.3)
LYMPHOCYTES # BLD AUTO: 28.9 % — SIGNIFICANT CHANGE UP (ref 13–44)
MCHC RBC-ENTMCNC: 31.2 PG — SIGNIFICANT CHANGE UP (ref 27–34)
MCHC RBC-ENTMCNC: 34.3 % — SIGNIFICANT CHANGE UP (ref 32–36)
MCV RBC AUTO: 91 FL — SIGNIFICANT CHANGE UP (ref 80–100)
MONOCYTES # BLD AUTO: 0.46 K/UL — SIGNIFICANT CHANGE UP (ref 0–0.9)
MONOCYTES NFR BLD AUTO: 7.3 % — SIGNIFICANT CHANGE UP (ref 2–14)
NEUTROPHILS # BLD AUTO: 3.73 K/UL — SIGNIFICANT CHANGE UP (ref 1.8–7.4)
NEUTROPHILS NFR BLD AUTO: 58.9 % — SIGNIFICANT CHANGE UP (ref 43–77)
NITRITE UR-MCNC: NEGATIVE — SIGNIFICANT CHANGE UP
NRBC # FLD: 0 — SIGNIFICANT CHANGE UP
PH UR: 6 — SIGNIFICANT CHANGE UP (ref 5–8)
PLATELET # BLD AUTO: 261 K/UL — SIGNIFICANT CHANGE UP (ref 150–400)
PMV BLD: 10.7 FL — SIGNIFICANT CHANGE UP (ref 7–13)
POTASSIUM SERPL-MCNC: 3.3 MMOL/L — LOW (ref 3.5–5.3)
POTASSIUM SERPL-SCNC: 3.3 MMOL/L — LOW (ref 3.5–5.3)
PROT SERPL-MCNC: 9.7 G/DL — HIGH (ref 6–8.3)
PROT UR-MCNC: 30 — SIGNIFICANT CHANGE UP
RBC # BLD: 5.32 M/UL — SIGNIFICANT CHANGE UP (ref 4.2–5.8)
RBC # FLD: 11.2 % — SIGNIFICANT CHANGE UP (ref 10.3–14.5)
SODIUM SERPL-SCNC: 140 MMOL/L — SIGNIFICANT CHANGE UP (ref 135–145)
SP GR SPEC: > 1.03 — SIGNIFICANT CHANGE UP (ref 1–1.04)
UROBILINOGEN FLD QL: 0.2 — SIGNIFICANT CHANGE UP
WBC # BLD: 6.33 K/UL — SIGNIFICANT CHANGE UP (ref 3.8–10.5)
WBC # FLD AUTO: 6.33 K/UL — SIGNIFICANT CHANGE UP (ref 3.8–10.5)

## 2018-11-09 PROCEDURE — 70450 CT HEAD/BRAIN W/O DYE: CPT | Mod: 26

## 2018-11-09 PROCEDURE — 74018 RADEX ABDOMEN 1 VIEW: CPT | Mod: 26

## 2018-11-09 PROCEDURE — 99223 1ST HOSP IP/OBS HIGH 75: CPT | Mod: GC

## 2018-11-09 RX ORDER — ARIPIPRAZOLE 15 MG/1
5 TABLET ORAL DAILY
Qty: 0 | Refills: 0 | Status: DISCONTINUED | OUTPATIENT
Start: 2018-11-09 | End: 2018-11-15

## 2018-11-09 RX ORDER — LACOSAMIDE 50 MG/1
150 TABLET ORAL
Qty: 0 | Refills: 0 | Status: DISCONTINUED | OUTPATIENT
Start: 2018-11-09 | End: 2018-11-15

## 2018-11-09 RX ORDER — BACLOFEN 100 %
10 POWDER (GRAM) MISCELLANEOUS
Qty: 0 | Refills: 0 | Status: DISCONTINUED | OUTPATIENT
Start: 2018-11-09 | End: 2018-11-15

## 2018-11-09 RX ORDER — POLYETHYLENE GLYCOL 3350 17 G/17G
17 POWDER, FOR SOLUTION ORAL
Qty: 0 | Refills: 0 | Status: DISCONTINUED | OUTPATIENT
Start: 2018-11-09 | End: 2018-11-10

## 2018-11-09 RX ORDER — LACOSAMIDE 50 MG/1
100 TABLET ORAL
Qty: 0 | Refills: 0 | Status: DISCONTINUED | OUTPATIENT
Start: 2018-11-10 | End: 2018-11-15

## 2018-11-09 RX ORDER — TOPIRAMATE 25 MG
200 TABLET ORAL EVERY 12 HOURS
Qty: 0 | Refills: 0 | Status: DISCONTINUED | OUTPATIENT
Start: 2018-11-10 | End: 2018-11-15

## 2018-11-09 RX ORDER — TOPIRAMATE 25 MG
200 TABLET ORAL ONCE
Qty: 0 | Refills: 0 | Status: COMPLETED | OUTPATIENT
Start: 2018-11-09 | End: 2018-11-09

## 2018-11-09 RX ORDER — ACETAMINOPHEN 500 MG
650 TABLET ORAL ONCE
Qty: 0 | Refills: 0 | Status: COMPLETED | OUTPATIENT
Start: 2018-11-09 | End: 2018-11-09

## 2018-11-09 RX ORDER — PANTOPRAZOLE SODIUM 20 MG/1
40 TABLET, DELAYED RELEASE ORAL
Qty: 0 | Refills: 0 | Status: DISCONTINUED | OUTPATIENT
Start: 2018-11-09 | End: 2018-11-15

## 2018-11-09 RX ORDER — ATORVASTATIN CALCIUM 80 MG/1
20 TABLET, FILM COATED ORAL AT BEDTIME
Qty: 0 | Refills: 0 | Status: DISCONTINUED | OUTPATIENT
Start: 2018-11-09 | End: 2018-11-15

## 2018-11-09 RX ORDER — LACOSAMIDE 50 MG/1
150 TABLET ORAL
Qty: 0 | Refills: 0 | Status: DISCONTINUED | OUTPATIENT
Start: 2018-11-09 | End: 2018-11-09

## 2018-11-09 RX ADMIN — LACOSAMIDE 150 MILLIGRAM(S): 50 TABLET ORAL at 22:06

## 2018-11-09 RX ADMIN — Medication 650 MILLIGRAM(S): at 15:06

## 2018-11-09 RX ADMIN — ATORVASTATIN CALCIUM 20 MILLIGRAM(S): 80 TABLET, FILM COATED ORAL at 22:06

## 2018-11-09 RX ADMIN — Medication 10 MILLIGRAM(S): at 22:07

## 2018-11-09 RX ADMIN — LACOSAMIDE 150 MILLIGRAM(S): 50 TABLET ORAL at 13:48

## 2018-11-09 RX ADMIN — ARIPIPRAZOLE 5 MILLIGRAM(S): 15 TABLET ORAL at 22:07

## 2018-11-09 RX ADMIN — Medication 200 MILLIGRAM(S): at 13:48

## 2018-11-09 NOTE — H&P ADULT - PROBLEM SELECTOR PLAN 6
IMPROVE score: 0  DVT ppx: SCDs   Diet: regular halal  PT consulted   Dr.Benziger Short   Internal Medicine   PGY-2   [phone] (long range pager)  [phone] (short range)

## 2018-11-09 NOTE — ED PROVIDER NOTE - ATTENDING CONTRIBUTION TO CARE
Attending Statement: I have personally seen and examined this patient. I have fully participated in the care of this patient. I have reviewed all pertinent clinical information, including history physical exam, plan and the Resident's note and agree except as noted  38yo M hx of TBI, epilepsy BIBEMS co seizure. pt states he woke up, went to brush his teeth, had a seizure, hit his head. He was discharged from [facility] a day ago sp seizure. Pt was discharged on vimpat and tompamax. No family w pt. Limited hx. Neg ROS. no complaints at this time.   Vital signs noted. sitting up. EOMI. mmm. no facial droop no resp distress. soft nt abdomen. contracted extremities, dec rom of lower ext. AO3 able to provide hx.   plan reviewed old chart. ct head, labs, ekg, urine, seizure precautions. neurology. re assess

## 2018-11-09 NOTE — CONSULT NOTE ADULT - SUBJECTIVE AND OBJECTIVE BOX
HPI:     Patient is a 37M with a history of TBI as a child and epilepsy who is total dependent in his ADLs who was recently hospitalized due to seizures and presents to the ED again today due to a recurrent seizure. At baseline, patient is minimally verbal. There is no family present at bedside today. He was recently taken off carbamazepine and switched to vimpat and topamax during his last hospitalization and was discharged two days ago. He had no seizures during his last hospitalization. He reportedly missed his medications today and then had a seizure which resulted in him hitting his head.       Objective:   Vital Signs Last 24 Hrs  T(C): 37.1 (09 Nov 2018 12:23), Max: 37.1 (09 Nov 2018 12:23)  T(F): 98.8 (09 Nov 2018 12:23), Max: 98.8 (09 Nov 2018 12:23)  HR: 93 (09 Nov 2018 14:07) (93 - 102)  BP: 131/62 (09 Nov 2018 14:07) (126/88 - 131/62)  BP(mean): --  RR: 16 (09 Nov 2018 14:07) (16 - 18)  SpO2: 100% (09 Nov 2018 14:07) (100% - 100%)    General: no acute distress  Neurologic:  - Mental Status:  severe expressive aphasia. follows commands  - Cranial Nerves II-XII:    II:  Visual fields are full to confrontation; Pupils are equal, round, and reactive to light;   III, IV, VI:  Extraocular movements are intact without nystagmus.  VII:  Face is symmetric with normal eye closure and smile  XII:  Tongue protudes in the midline.  - Motor:  right flexion contractures  Further neurological exam limited                          16.6   6.33  )-----------( 261      ( 09 Nov 2018 15:00 )             48.4     Radiology    < from: CT Head No Cont (11.09.18 @ 16:01) >  IMPRESSION:    No acute intracranial pathology is noted. Left frontal lobe   encephalomalacia is again visualized. If symptoms persist, consider short   interval follow-up head CT or brain MRI, if there are no MRI   contraindications.      < end of copied text > HPI:     Patient is a 37M with a history of TBI as a child and epilepsy who is total dependent in his ADLs who was recently hospitalized due to seizures and presents to the ED again today due to a fall. At baseline, patient is minimally verbal. There is no family present at bedside today. He was recently taken off carbamazepine and switched to vimpat and topamax during his last hospitalization and was discharged two days ago. He had no seizures during his last hospitalization. He reportedly missed his medications today and then had a fall which resulted in his hitting his head.      Objective:   Vital Signs Last 24 Hrs  T(C): 37.1 (09 Nov 2018 12:23), Max: 37.1 (09 Nov 2018 12:23)  T(F): 98.8 (09 Nov 2018 12:23), Max: 98.8 (09 Nov 2018 12:23)  HR: 93 (09 Nov 2018 14:07) (93 - 102)  BP: 131/62 (09 Nov 2018 14:07) (126/88 - 131/62)  BP(mean): --  RR: 16 (09 Nov 2018 14:07) (16 - 18)  SpO2: 100% (09 Nov 2018 14:07) (100% - 100%)    General: no acute distress  Neurologic:  - Mental Status:  severe expressive aphasia. follows commands  - Cranial Nerves II-XII:    II:  Visual fields are full to confrontation; Pupils are equal, round, and reactive to light;   III, IV, VI:  Extraocular movements are intact without nystagmus.  VII:  Face is symmetric with normal eye closure and smile  XII:  Tongue protudes in the midline.  - Motor:  right flexion contractures  Further neurological exam limited                          16.6   6.33  )-----------( 261      ( 09 Nov 2018 15:00 )             48.4     Radiology    < from: CT Head No Cont (11.09.18 @ 16:01) >  IMPRESSION:    No acute intracranial pathology is noted. Left frontal lobe   encephalomalacia is again visualized. If symptoms persist, consider short   interval follow-up head CT or brain MRI, if there are no MRI   contraindications.      < end of copied text >

## 2018-11-09 NOTE — ED PROVIDER NOTE - PHYSICAL EXAMINATION
General: well appearing, interactive, well nourished, no apparent distress  HEENT: EOMI, PERRLA, normal mucosa, normal oropharynx, no lesions on the lips or on oral mucosa, normal external ear  head: No septal hematoma, TM x2 clear w/o e/o hemotypanum or CSF rhinorrhea, no rivas sign, raccoon eyes or csf rhinorrhea, no e/o entrapment, no diplopia on EOM, PERRL, EOMI. No facial tenderness over zygoma, mandible or maxilla. TMJ intact. Midface stable. Nose midline without significant deviation. No ML C-spine TTP   Neck: supple, no lymphadenopathy, full range of motion, no nuchal rigidity  CV: RRR, normal S1 and S2 with no murmur, capillary refill less than two seconds  Resp: lungs CTA b/l, good aeration bilaterally, symmetric chest wall   Abd: non-distended, soft, non-tender, normoactive bowel sounds  : no CVA tenderness  MSK: r side UE decerebrate posturing  Neuro: cranial nerves intact  Skin: no rashes, skin intact

## 2018-11-09 NOTE — ED PROVIDER NOTE - NS ED ROS FT
GENERAL: No fever or chills, //             EYES: no change in vision, //             HEENT: no trouble swallowing or speaking, //             CARDIAC: no chest pain, //              PULMONARY: no cough or SOB, //             GI: no abdominal pain, no nausea or no vomiting, no diarrhea or constipation, //             : No changes in urination,  //            SKIN: no rashes,  //            NEURO: headache,  //             MSK: No joint pain otherwise as HPI or negative. ~Forrest Alicia DO PGY1

## 2018-11-09 NOTE — H&P ADULT - PROBLEM SELECTOR PLAN 4
as per father, last BM 2 weeks ago, unclear if true as pt discharged from hospital yesterday.  -obtain abdomen cxr looking for stool burden; start bowel regime if significant   -stool count

## 2018-11-09 NOTE — H&P ADULT - NSHPLABSRESULTS_GEN_ALL_CORE
.  Labs reviewed personally.                          16.6   6.33  )-----------( 261      ( 09 Nov 2018 15:00 )             48.4     Hgb Trend: 16.6<--, 12.9<--, 11.5<--, 13.5<--  11-09    140  |  100  |  18  ----------------------------<  101<H>  3.3<L>   |  23  |  0.87    Ca    9.9      09 Nov 2018 15:00    TPro  9.7<H>  /  Alb  4.8  /  TBili  0.4  /  DBili  x   /  AST  34  /  ALT  26  /  AlkPhos  102  11-09    Creatinine Trend: 0.87<--, 0.78<--, 0.77<--, 0.68<--, 0.70<--, 0.68<--        Imaging reviewed personally.  < from: CT Head No Cont (11.09.18 @ 16:01) >    Description: A noncontrast head CT was performed. Axial images were performed from the skull base to the vertex with coronal/sagittal reconstructions.  Comparison is made to a head CT from 11/03/2018.  Cystic encephalomalacia in the left frontal lobe is again noted, which communicates with the left lateral ventricle, unchanged compared to the prior study.  There is no evidence for acute infarct, calvarial fracture, acute hemorrhage, mass effect, or hydrocephalus. Asymmetric cerebellar greater than cerebral atrophy is noted, unchanged.  Old left craniotomy changes are again noted. The visualized portions of the paranasal sinuses and mastoid air cells are clear.  IMPRESSION:  No acute intracranial pathology is noted. Left frontal lobe encephalomalacia is again visualized. If symptoms persist, consider short interval follow-up head CT or brain MRI, if there are no MRI   contraindications.  < end of copied text >

## 2018-11-09 NOTE — ED PROVIDER NOTE - PROGRESS NOTE DETAILS
neuro consulted.  - Forrest Alicia D.O. PGY1 collateral obtained from family after second phone call placed. per sister the patient has been falling since being DC from hospital and has complained of weakness and lightheadedness. family desires rehab for pt. neuro rec decreasing med dosage. social work contacted, unable to place pt in rehab directly from ED. admit.  - Forrest Alicia D.O. PGY1

## 2018-11-09 NOTE — H&P ADULT - PROBLEM SELECTOR PLAN 2
possible vimpat toxicity as main side effect is dizziness, neurology following and recommended decrease to 100mg in AM and 150mg in PM   -check orthostatics CT head negative for trauma, no signs of trauma, no obvious sings of fractures or MSK pain. Unclear etiology given lack of clinical history. Previous record with fall at home from seizure activities (similar to this admission)  - Continue to monitor while inpatient.  - Fall risk ordered.   - CTH negative. If worsening neuro status, will repeat imaging.  -PT consulted CT head negative for trauma, no signs of trauma, no obvious sings of fractures or MSK pain. Unclear etiology given lack of clinical history. Previous record with fall at home from seizure activities (similar to this admission). Unclear how much pt was walking previously as he has contractures of the upper extremity and in previous notes noted to be wheelchair bound.  - Continue to monitor while inpatient.  - Fall risk ordered.   - CTH negative. If worsening neuro status, will repeat imaging.  -PT consulted

## 2018-11-09 NOTE — ED PROVIDER NOTE - MEDICAL DECISION MAKING DETAILS
35 yo m hx epilepsy, presents after seizure. non-toxic appearing in ED, AAOx3. labs. meds. reassess.

## 2018-11-09 NOTE — CONSULT NOTE ADULT - ASSESSMENT
Patient is a 37M with a history of TBI as a child and epilepsy who is total dependent in his ADLs who was recently hospitalized due to seizures and presents to the ED again today due to a recurrent seizure. At baseline, patient is minimally verbal. There is no family present at bedside today. He was recently taken off carbamazepine and switched to vimpat and topamax during his last hospitalization and was discharged two days ago. He had no seizures during his last hospitalization. He reportedly missed his medications today and then had a seizure which resulted in him hitting his head.     CTH was obtained which showed no acute abnormalities, but did show left frontal lobe encephalomalacia that is chronic. His EEG from last hospitalization showed slowing in the left frontal area consistent with this. Patient is appears to be at neurological baseline currently.     Recommendations:  Increase Vimpat to 200 mg BID (was taking 150 mg BID at home)  Continue topiramate 200 mg BID  Give doses of both his AEDs now in the ED (Vimpat 200 mg and topiramate 200 mg)  Patient can follow-up with Dr. Walsh from epilepsy/neurology as outpatient at [address] [place] [address] (call [phone]) for appointment Patient is a 37M with a history of TBI as a child and epilepsy who is total dependent in his ADLs who was recently hospitalized due to seizures and presents to the ED again today due to a fall. At baseline, patient is minimally verbal. There is no family present at bedside today. He was recently taken off carbamazepine and switched to vimpat and topamax during his last hospitalization and was discharged two days ago. He had no seizures during his last hospitalization. He reportedly missed his medications today and then had a fall which resulted in him hitting his head.     CTH was obtained which showed no acute abnormalities, but did show left frontal lobe encephalomalacia that is chronic. His EEG from last hospitalization showed slowing in the left frontal area consistent with this. Patient is appears to be at neurological baseline currently.     Impression: patient may be having toxicity/dizziness from the recent increase in vimpat; will reduce dose slightly and have patient follow-up with neurology as outpatient    Recommendations:  Reduce Vimpat to 100 mg in the AM and 150 in the PM   Continue topiramate 200 mg BID  Patient can follow-up with Dr. Walsh from epilepsy/neurology as outpatient at [address] [place] [address] (call [phone]) for appointment

## 2018-11-09 NOTE — H&P ADULT - HISTORY OF PRESENT ILLNESS
36 y/o male, total care dependent, with h/o TBI at the age of 4, epilepsy at the age of 13, multiple hospitalizations 2/2 seizures presenting with dizziness and fall.     As per father, patient has been unable to walk for nearly two weeks. He was attempting to get up yesterday from chair and father states he felt dizzy and fell. Father states that patient "walking and talking" at baseline. However chart notes state he is predominantly wheel chair bound. Father also claims patient has not had a BM in two weeks. Denies any fevers, chills or night sweats. As per father, AED was changed recently to vimpat and topamax. 36 y/o male, total care dependent, with h/o TBI at the age of 4, epilepsy at the age of 13, multiple hospitalizations 2/2 seizures presenting with reported dizziness and fall.     As per father, patient has been unable to walk for nearly two weeks. He was attempting to get up yesterday from chair and father states he felt dizzy and fell. He denies LOC, n/v , tonic clonic shaking/seizure like activity, fecal incontinence or urinary incontinence or tongue biting. Father states that patient "walking and talking" at baseline. However chart notes state he is predominantly wheel chair bound. Father also claims patient has not had a BM in two weeks. Denies any fevers, chills or night sweats. As per father, AED was changed recently to vimpat and topamax.

## 2018-11-09 NOTE — ED ADULT NURSE NOTE - OBJECTIVE STATEMENT
Patient presented to ED with EMS for evaluation of seizure activity.  Patient blood work to be drawn by provider as he is a difficult stick.  IV line in place.  Patient evaluated by provider plan is for PO home seizure meds and CT scan pending.  Patient is awake and alert answering questions.  Right arm contracted and scally skin with scabs noted to head.  Will continue to monitor patient closely.  Amarjit MALONEY

## 2018-11-09 NOTE — H&P ADULT - PROBLEM SELECTOR PLAN 3
CT head negative for trauma, no signs of trauma, no obvious sings of fractures or MSK pain. Unclear etiology given lack of clinical history. Previous record with fall at home from seizure activities (similar to this admission)  - Continue to monitor while inpatient.  - Fall risk ordered.   - CTH negative. If worsening neuro status, will repeat imaging.  -PT consulted Unclear if seizure occurred, conflicting information from father. concern for Vimpat toxicity given claim of dizziness and fall to floor w/ head trauma.  neurology following: recommend   Reduce Vimpat to 100 mg in the AM and 150 in the PM   Continue topiramate 200 mg BID  Patient can follow-up with Dr. Walsh from epilepsy/neurology as outpatient at [address] [place] [address] (call [phone]) for appointment Unclear if seizure occurred, conflicting information from father.   neurology following:  Recs appreciated. Recommend   Reduce Vimpat to 100 mg in the AM and 150 in the PM   Continue topiramate 200 mg BID  Patient can follow-up with Dr. Walsh from epilepsy/neurology as outpatient at [address] [place] [address] (call [phone]) for appointment

## 2018-11-09 NOTE — ED PROVIDER NOTE - OBJECTIVE STATEMENT
36 yo m hx TBI at age 3, epilepsy age 13, BIBEMS for seizures. pt states he awoke this morning, went to brush his teeth, and suffered a seizure and hit his head. pt dc from [facility] two days prior for seizures, EEG performed. meds changed to vimpat and topamax. did not take medication today, took them yesterday. reports ha. denies n/v, f/c, cp, sob, other injuries.

## 2018-11-09 NOTE — H&P ADULT - ASSESSMENT
38 y/o male, total care dependent, with h/o TBI at the age of 4, epilepsy at the age of 13, multiple hospitalizations 2/2 seizures presenting with dizziness and fall. 38 y/o male, total care dependent, with h/o TBI at the age of 4, epilepsy at the age of 13, multiple hospitalizations 2/2 seizures presenting with reported dizziness and fall.

## 2018-11-09 NOTE — H&P ADULT - PROBLEM SELECTOR PLAN 1
Unclear if seizure occurred, conflicting information from father. concern for Vimpat toxicity given claim of dizziness and fall to floor w/ head trauma.  neurology following: recommend   Reduce Vimpat to 100 mg in the AM and 150 in the PM   Continue topiramate 200 mg BID  Patient can follow-up with Dr. Walsh from epilepsy/neurology as outpatient at [address] [place] [address] (call [phone]) for appointment possible vimpat toxicity as main side effect is dizziness, neurology following and recommended decrease to 100mg in AM and 150mg in PM   -check orthostatics Unclear hx as patient unable to verbalize true dizziness which was reported by family. Possible recent change to vimpat playing a role.   - neurology following and recommended decrease to 100mg in AM and 150mg in PM   -check orthostatics  -monitor neuro status

## 2018-11-09 NOTE — H&P ADULT - NSHPPHYSICALEXAM_GEN_ALL_CORE
T(C): 36.8 (11-09-18 @ 17:06), Max: 37.1 (11-09-18 @ 12:23)  HR: 79 (11-09-18 @ 17:06) (79 - 102)  BP: 102/70 (11-09-18 @ 17:06) (102/70 - 131/62)  RR: 18 (11-09-18 @ 17:06) (16 - 18)  SpO2: 100% (11-09-18 @ 17:06) (100% - 100%)  Wt(kg): -- T(C): 36.8 (11-09-18 @ 17:06), Max: 37.1 (11-09-18 @ 12:23)  HR: 79 (11-09-18 @ 17:06) (79 - 102)  BP: 102/70 (11-09-18 @ 17:06) (102/70 - 131/62)  RR: 18 (11-09-18 @ 17:06) (16 - 18)  SpO2: 100% (11-09-18 @ 17:06) (100% - 100%)  Wt(kg): --    PHYSICAL EXAM:  GENERAL: NAD,  HEAD:  Atraumatic, Normocephalic  EYES: EOMI, PERRLA, conjunctiva and sclera clear  ENMT: No tonsillar erythema, exudates, or enlargement; Moist mucous membranes, Good dentition, No lesions  NECK: Supple, No JVD, Normal thyroid, full ROM  CHEST/LUNG: Clear to auscultation bilaterally; No rales, rhonchi, wheezing, or rubs  HEART: Regular rate and rhythm; No murmurs, rubs, or gallops  ABDOMEN: Soft, Nontender, Nondistended; Bowel sounds present  EXTREMITIES:  2+ Peripheral Pulses, No clubbing, cyanosis, or edema  LYMPH: No lymphadenopathy noted  SKIN: No rashes or lesions  NERVOUS SYSTEM:  Alert; b/l upper extremity contractures T(C): 36.8 (11-09-18 @ 17:06), Max: 37.1 (11-09-18 @ 12:23)  HR: 79 (11-09-18 @ 17:06) (79 - 102)  BP: 102/70 (11-09-18 @ 17:06) (102/70 - 131/62)  RR: 18 (11-09-18 @ 17:06) (16 - 18)  SpO2: 100% (11-09-18 @ 17:06) (100% - 100%)  Wt(kg): --    PHYSICAL EXAM:  GENERAL: NAD,  HEAD:  Atraumatic, Normocephalic  EYES: EOMI, PERRLA, conjunctiva and sclera clear  ENMT: No tonsillar erythema, exudates, or enlargement; Moist mucous membranes, Good dentition, No lesions  NECK: Supple, No JVD, Normal thyroid, full ROM  CHEST/LUNG: Clear to auscultation bilaterally; No rales, rhonchi, wheezing, or rubs  HEART: Regular rate and rhythm; No murmurs, rubs, or gallops  ABDOMEN: Soft, Nontender, Nondistended; Bowel sounds present  EXTREMITIES:  2+ Peripheral Pulses, No clubbing, cyanosis, or edema  LYMPH: No lymphadenopathy noted  SKIN: No rashes or lesions  NERVOUS SYSTEM:  Alert; b/l upper extremity contractures  MSK: No joint swelling/ erythema

## 2018-11-09 NOTE — ED ADULT TRIAGE NOTE - CHIEF COMPLAINT QUOTE
TBI patient normally non verbal was DC'd yesterday had another reported seizure today was found post ictal by EMS. pt currently at baseline which is altered

## 2018-11-10 LAB
BASOPHILS # BLD AUTO: 0.06 K/UL — SIGNIFICANT CHANGE UP (ref 0–0.2)
BASOPHILS NFR BLD AUTO: 0.9 % — SIGNIFICANT CHANGE UP (ref 0–2)
BUN SERPL-MCNC: 19 MG/DL — SIGNIFICANT CHANGE UP (ref 7–23)
CALCIUM SERPL-MCNC: 9.4 MG/DL — SIGNIFICANT CHANGE UP (ref 8.4–10.5)
CHLORIDE SERPL-SCNC: 100 MMOL/L — SIGNIFICANT CHANGE UP (ref 98–107)
CO2 SERPL-SCNC: 18 MMOL/L — LOW (ref 22–31)
CREAT SERPL-MCNC: 0.71 MG/DL — SIGNIFICANT CHANGE UP (ref 0.5–1.3)
EOSINOPHIL # BLD AUTO: 0.31 K/UL — SIGNIFICANT CHANGE UP (ref 0–0.5)
EOSINOPHIL NFR BLD AUTO: 4.7 % — SIGNIFICANT CHANGE UP (ref 0–6)
GLUCOSE SERPL-MCNC: 111 MG/DL — HIGH (ref 70–99)
HCT VFR BLD CALC: 43.3 % — SIGNIFICANT CHANGE UP (ref 39–50)
HGB BLD-MCNC: 14.8 G/DL — SIGNIFICANT CHANGE UP (ref 13–17)
IMM GRANULOCYTES # BLD AUTO: 0.02 # — SIGNIFICANT CHANGE UP
IMM GRANULOCYTES NFR BLD AUTO: 0.3 % — SIGNIFICANT CHANGE UP (ref 0–1.5)
LYMPHOCYTES # BLD AUTO: 1.69 K/UL — SIGNIFICANT CHANGE UP (ref 1–3.3)
LYMPHOCYTES # BLD AUTO: 25.9 % — SIGNIFICANT CHANGE UP (ref 13–44)
MAGNESIUM SERPL-MCNC: 2.1 MG/DL — SIGNIFICANT CHANGE UP (ref 1.6–2.6)
MCHC RBC-ENTMCNC: 31 PG — SIGNIFICANT CHANGE UP (ref 27–34)
MCHC RBC-ENTMCNC: 34.2 % — SIGNIFICANT CHANGE UP (ref 32–36)
MCV RBC AUTO: 90.8 FL — SIGNIFICANT CHANGE UP (ref 80–100)
MONOCYTES # BLD AUTO: 0.43 K/UL — SIGNIFICANT CHANGE UP (ref 0–0.9)
MONOCYTES NFR BLD AUTO: 6.6 % — SIGNIFICANT CHANGE UP (ref 2–14)
NEUTROPHILS # BLD AUTO: 4.02 K/UL — SIGNIFICANT CHANGE UP (ref 1.8–7.4)
NEUTROPHILS NFR BLD AUTO: 61.6 % — SIGNIFICANT CHANGE UP (ref 43–77)
NRBC # FLD: 0 — SIGNIFICANT CHANGE UP
PHOSPHATE SERPL-MCNC: 4.7 MG/DL — HIGH (ref 2.5–4.5)
PLATELET # BLD AUTO: 240 K/UL — SIGNIFICANT CHANGE UP (ref 150–400)
PMV BLD: 10.9 FL — SIGNIFICANT CHANGE UP (ref 7–13)
POTASSIUM SERPL-MCNC: 3.3 MMOL/L — LOW (ref 3.5–5.3)
POTASSIUM SERPL-SCNC: 3.3 MMOL/L — LOW (ref 3.5–5.3)
RBC # BLD: 4.77 M/UL — SIGNIFICANT CHANGE UP (ref 4.2–5.8)
RBC # FLD: 11.2 % — SIGNIFICANT CHANGE UP (ref 10.3–14.5)
SODIUM SERPL-SCNC: 137 MMOL/L — SIGNIFICANT CHANGE UP (ref 135–145)
WBC # BLD: 6.53 K/UL — SIGNIFICANT CHANGE UP (ref 3.8–10.5)
WBC # FLD AUTO: 6.53 K/UL — SIGNIFICANT CHANGE UP (ref 3.8–10.5)

## 2018-11-10 PROCEDURE — 99232 SBSQ HOSP IP/OBS MODERATE 35: CPT | Mod: GC

## 2018-11-10 RX ORDER — POLYETHYLENE GLYCOL 3350 17 G/17G
17 POWDER, FOR SOLUTION ORAL DAILY
Qty: 0 | Refills: 0 | Status: DISCONTINUED | OUTPATIENT
Start: 2018-11-10 | End: 2018-11-11

## 2018-11-10 RX ORDER — POTASSIUM CHLORIDE 20 MEQ
20 PACKET (EA) ORAL ONCE
Qty: 0 | Refills: 0 | Status: COMPLETED | OUTPATIENT
Start: 2018-11-10 | End: 2018-11-10

## 2018-11-10 RX ORDER — SENNA PLUS 8.6 MG/1
2 TABLET ORAL AT BEDTIME
Qty: 0 | Refills: 0 | Status: DISCONTINUED | OUTPATIENT
Start: 2018-11-10 | End: 2018-11-11

## 2018-11-10 RX ORDER — DOCUSATE SODIUM 100 MG
100 CAPSULE ORAL DAILY
Qty: 0 | Refills: 0 | Status: DISCONTINUED | OUTPATIENT
Start: 2018-11-10 | End: 2018-11-11

## 2018-11-10 RX ORDER — POTASSIUM CHLORIDE 20 MEQ
20 PACKET (EA) ORAL ONCE
Qty: 0 | Refills: 0 | Status: DISCONTINUED | OUTPATIENT
Start: 2018-11-10 | End: 2018-11-10

## 2018-11-10 RX ADMIN — Medication 200 MILLIGRAM(S): at 02:32

## 2018-11-10 RX ADMIN — Medication 20 MILLIEQUIVALENT(S): at 16:38

## 2018-11-10 RX ADMIN — SENNA PLUS 2 TABLET(S): 8.6 TABLET ORAL at 22:13

## 2018-11-10 RX ADMIN — POLYETHYLENE GLYCOL 3350 17 GRAM(S): 17 POWDER, FOR SOLUTION ORAL at 01:42

## 2018-11-10 RX ADMIN — PANTOPRAZOLE SODIUM 40 MILLIGRAM(S): 20 TABLET, DELAYED RELEASE ORAL at 06:31

## 2018-11-10 RX ADMIN — POLYETHYLENE GLYCOL 3350 17 GRAM(S): 17 POWDER, FOR SOLUTION ORAL at 13:26

## 2018-11-10 RX ADMIN — ARIPIPRAZOLE 5 MILLIGRAM(S): 15 TABLET ORAL at 13:20

## 2018-11-10 RX ADMIN — Medication 10 MILLIGRAM(S): at 06:31

## 2018-11-10 RX ADMIN — Medication 100 MILLIGRAM(S): at 13:27

## 2018-11-10 RX ADMIN — LACOSAMIDE 150 MILLIGRAM(S): 50 TABLET ORAL at 22:09

## 2018-11-10 RX ADMIN — ATORVASTATIN CALCIUM 20 MILLIGRAM(S): 80 TABLET, FILM COATED ORAL at 22:05

## 2018-11-10 RX ADMIN — LACOSAMIDE 100 MILLIGRAM(S): 50 TABLET ORAL at 06:31

## 2018-11-10 RX ADMIN — Medication 10 MILLIGRAM(S): at 18:56

## 2018-11-10 RX ADMIN — Medication 200 MILLIGRAM(S): at 13:20

## 2018-11-10 NOTE — PHYSICAL THERAPY INITIAL EVALUATION ADULT - PATIENT PROFILE REVIEW, REHAB EVAL
PT orders received--> . Consult with RN -->pt OK to participate in PT evaluation./yes PT orders received-->no formal activity order. Consult with REECE SIDHU-->pt OK to participate in PT evaluation./yes

## 2018-11-10 NOTE — PHYSICAL THERAPY INITIAL EVALUATION ADULT - ADDITIONAL COMMENTS
Conflicting history, information needs to be verified. Patient minimally verbal during PT evaluation and unable to provide accurate history. When asked if patient walks, he shook his head no. Per H&P: patient is wheelchair bound, however per pt's father, patient "walks and talks". Per chart note from Dr. Lidia Rutledge MD: Per patient's sister, "pt is ambulatory at baseline and walks independently with a cane at home."    Patient was left semi-supine in bed as found, all lines/tubes intact and call bell within reach, RN aware

## 2018-11-10 NOTE — PROGRESS NOTE ADULT - SUBJECTIVE AND OBJECTIVE BOX
Dr. Lidia Rutledge, PGY1  Pager [phone]    TASIA WARE  37y  MRN: [id number]    Subjective:  Patient is a 37y old  Male who presents with a chief complaint of dizziness (09 Nov 2018 18:09)      Overnight:   No acute events      MEDICATIONS  (STANDING):  ARIPiprazole 5 milliGRAM(s) Oral daily  atorvastatin 20 milliGRAM(s) Oral at bedtime  baclofen 10 milliGRAM(s) Oral two times a day  docusate sodium 100 milliGRAM(s) Oral daily  lacosamide 150 milliGRAM(s) Oral <User Schedule>  lacosamide 100 milliGRAM(s) Oral <User Schedule>  pantoprazole    Tablet 40 milliGRAM(s) Oral before breakfast  polyethylene glycol 3350 17 Gram(s) Oral daily  senna 2 Tablet(s) Oral at bedtime  topiramate 200 milliGRAM(s) Oral every 12 hours    MEDICATIONS  (PRN):        Objective:  Vitals: Vital Signs Last 24 Hrs  T(C): 36.7 (11-10-18 @ 06:23), Max: 37.1 (11-09-18 @ 12:23)  T(F): 98 (11-10-18 @ 06:23), Max: 98.8 (11-09-18 @ 12:23)  HR: 81 (11-10-18 @ 06:23) (79 - 102)  BP: 100/76 (11-10-18 @ 06:23) (100/76 - 181/85)  BP(mean): --  RR: 14 (11-10-18 @ 06:23) (14 - 18)  SpO2: 99% (11-10-18 @ 06:23) (99% - 100%)                I&O's Summary      PHYSICAL EXAM:  GENERAL: NAD, poorly groomed  HEAD:  Atraumatic, Normocephalic  EYES: EOMI, PERRLA, conjunctiva and sclera clear  ENMT: No tonsillar erythema, exudates, or enlargement; Moist mucous membranes, Good dentition, No lesions  NECK: Supple, No JVD, Normal thyroid, full ROM  CHEST/LUNG: Clear to auscultation bilaterally; No rales, rhonchi, wheezing, or rubs  HEART: Regular rate and rhythm; No murmurs, rubs, or gallops  ABDOMEN: Soft, Nontender, Nondistended; Bowel sounds present  EXTREMITIES:  2+ Peripheral Pulses, No clubbing, cyanosis, or edema  LYMPH: No lymphadenopathy noted  SKIN: No rashes or lesions  NERVOUS SYSTEM:  Alert; b/l upper extremity contractures                                        LABS:  11-10    137  |  100  |  19  ----------------------------<  111<H>  3.3<L>   |  18<L>  |  0.71  11-09    140  |  100  |  18  ----------------------------<  101<H>  3.3<L>   |  23  |  0.87    Ca    9.4      10 Nov 2018 05:22  Ca    9.9      09 Nov 2018 15:00  Phos  4.7     11-10  Mg     2.1     11-10    TPro  9.7<H>  /  Alb  4.8  /  TBili  0.4  /  DBili  x   /  AST  34  /  ALT  26  /  AlkPhos  102  11-09                 Urinalysis Basic - ( 09 Nov 2018 18:31 )  Color: MARILUZ / Appearance: CLEAR / SG: > 1.030 / pH: 6.0  Gluc: NEGATIVE / Ketone: 15  / Bili: SMALL / Urobili: 0.2   Blood: NEGATIVE / Protein: 30 / Nitrite: NEGATIVE   Leuk Esterase: NEGATIVE / RBC: x / WBC x   Sq Epi: x / Non Sq Epi: x / Bacteria: x                          14.8   6.53  )-----------( 240      ( 10 Nov 2018 05:22 )             43.3                         16.6   6.33  )-----------( 261      ( 09 Nov 2018 15:00 )             48.4     CAPILLARY BLOOD GLUCOSE  POCT Blood Glucose.: 89 mg/dL (09 Nov 2018 12:25)        RADIOLOGY & ADDITIONAL TESTS:  CT Head No Cont (11.09.18 @ 16:01)  IMPRESSION:  No acute intracranial pathology is noted. Left frontal lobe   encephalomalacia is again visualized. If symptoms persist, consider short   interval follow-up head CT or brain MRI, if there are no MRI   contraindications.    Imaging Personally Reviewed:  [x ] YES  [ ] NO      Consultants involved in case:   NEUROLOGY:  patient may be having toxicity/dizziness from the recent increase in vimpat; will reduce dose slightly and have patient follow-up with neurology as outpatient  Reduce Vimpat to 100 mg in the AM and 150 in the PM   Continue topiramate 200 mg BID  Patient can follow-up with Dr. Walsh from epilepsy/neurology as outpatient at [address] [place] [address] (call [phone]) for appointment    Consultant(s) Notes Reviewed:  [ x] YES  [ ] NO:   Care Discussed with Consultants/Other Providers [ ] YES  [ ] Dr. Lidia Rutledge, PGY1  Pager [phone]    TASIA WARE  37y  MRN: [id number]    Subjective:  Patient is a 37y old  Male who presents with a chief complaint of dizziness (09 Nov 2018 18:09)      Overnight:   No acute events. This morning patient resting comfortably, no acute distress.      MEDICATIONS  (STANDING):  ARIPiprazole 5 milliGRAM(s) Oral daily  atorvastatin 20 milliGRAM(s) Oral at bedtime  baclofen 10 milliGRAM(s) Oral two times a day  docusate sodium 100 milliGRAM(s) Oral daily  lacosamide 150 milliGRAM(s) Oral <User Schedule>  lacosamide 100 milliGRAM(s) Oral <User Schedule>  pantoprazole    Tablet 40 milliGRAM(s) Oral before breakfast  polyethylene glycol 3350 17 Gram(s) Oral daily  senna 2 Tablet(s) Oral at bedtime  topiramate 200 milliGRAM(s) Oral every 12 hours    MEDICATIONS  (PRN):        Objective:  Vitals: Vital Signs Last 24 Hrs  T(C): 36.7 (11-10-18 @ 06:23), Max: 37.1 (11-09-18 @ 12:23)  T(F): 98 (11-10-18 @ 06:23), Max: 98.8 (11-09-18 @ 12:23)  HR: 81 (11-10-18 @ 06:23) (79 - 102)  BP: 100/76 (11-10-18 @ 06:23) (100/76 - 181/85)  BP(mean): --  RR: 14 (11-10-18 @ 06:23) (14 - 18)  SpO2: 99% (11-10-18 @ 06:23) (99% - 100%)                I&O's Summary      PHYSICAL EXAM:  GENERAL: NAD, poorly groomed  HEAD:  Atraumatic, Normocephalic  EYES: EOMI, PERRLA, conjunctiva and sclera clear  ENMT: No tonsillar erythema, exudates, or enlargement; Moist mucous membranes, fair dentition, No lesions  NECK: Supple, No JVD, Normal thyroid, full ROM  CHEST/LUNG: Clear to auscultation bilaterally; No rales, rhonchi, wheezing, or rubs  HEART: Regular rate and rhythm; No murmurs, rubs, or gallops  ABDOMEN: Soft, Nontender, Nondistended; Bowel sounds present  EXTREMITIES:  2+ Peripheral Pulses, No clubbing, cyanosis, or edema  LYMPH: No lymphadenopathy noted  SKIN: No rashes or lesions  NERVOUS SYSTEM:  Alert; b/l upper extremity contractures                                        LABS:  11-10    137  |  100  |  19  ----------------------------<  111<H>  3.3<L>   |  18<L>  |  0.71  11-09    140  |  100  |  18  ----------------------------<  101<H>  3.3<L>   |  23  |  0.87    Ca    9.4      10 Nov 2018 05:22  Ca    9.9      09 Nov 2018 15:00  Phos  4.7     11-10  Mg     2.1     11-10    TPro  9.7<H>  /  Alb  4.8  /  TBili  0.4  /  DBili  x   /  AST  34  /  ALT  26  /  AlkPhos  102  11-09                 Urinalysis Basic - ( 09 Nov 2018 18:31 )  Color: MARILUZ / Appearance: CLEAR / SG: > 1.030 / pH: 6.0  Gluc: NEGATIVE / Ketone: 15  / Bili: SMALL / Urobili: 0.2   Blood: NEGATIVE / Protein: 30 / Nitrite: NEGATIVE   Leuk Esterase: NEGATIVE / RBC: x / WBC x   Sq Epi: x / Non Sq Epi: x / Bacteria: x                          14.8   6.53  )-----------( 240      ( 10 Nov 2018 05:22 )             43.3                         16.6   6.33  )-----------( 261      ( 09 Nov 2018 15:00 )             48.4     CAPILLARY BLOOD GLUCOSE  POCT Blood Glucose.: 89 mg/dL (09 Nov 2018 12:25)        RADIOLOGY & ADDITIONAL TESTS:  CT Head No Cont (11.09.18 @ 16:01)  IMPRESSION:  No acute intracranial pathology is noted. Left frontal lobe   encephalomalacia is again visualized. If symptoms persist, consider short   interval follow-up head CT or brain MRI, if there are no MRI   contraindications.    Imaging Personally Reviewed:  [x ] YES  [ ] NO      Consultants involved in case:   NEUROLOGY:  patient may be having toxicity/dizziness from the recent increase in vimpat; will reduce dose slightly and have patient follow-up with neurology as outpatient  Reduce Vimpat to 100 mg in the AM and 150 in the PM   Continue topiramate 200 mg BID  Patient can follow-up with Dr. Walsh from epilepsy/neurology as outpatient at [address] [place] [address] (call [phone]) for appointment    Consultant(s) Notes Reviewed:  [ x] YES  [ ] NO:   Care Discussed with Consultants/Other Providers [ ] YES  [ ]

## 2018-11-10 NOTE — PHYSICAL THERAPY INITIAL EVALUATION ADULT - PERTINENT HX OF CURRENT PROBLEM, REHAB EVAL
Patient is a 37 year old male admitted to [facility] on 11/9 s/p fall at home. 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

## 2018-11-10 NOTE — PROGRESS NOTE ADULT - PROBLEM SELECTOR PLAN 2
CT head negative for trauma, no signs of trauma, no obvious signs of fractures or MSK pain. Unclear etiology given lack of clinical history. Previous record with fall at home from seizure activities (similar to this admission). Unclear how much pt was walking previously as he has contractures of the upper extremity and previously noted to be wheelchair bound.  - Continue to monitor while inpatient.  - Fall risk ordered.   - CTH negative (unchanged from previous). If worsening neuro status, will repeat imaging.  - PT consulted, consider rehab placement CT head negative for trauma, no signs of trauma, no obvious signs of fractures or MSK pain. Unclear etiology given lack of clinical history. Previous record with fall at home from seizure activities (similar to this admission). Unclear how much pt was walking previously as he has contractures of the upper extremity and previously noted to be wheelchair bound.  - Continue to monitor while inpatient.  - Fall risk ordered.   - CTH negative (unchanged from previous). If worsening neuro status, will repeat imaging.  - PT consulted, consider rehab placement  - collateral from patient's sister indicates that patient was ambulatory with a cane at baseline as recently as 10 days ago

## 2018-11-10 NOTE — PROGRESS NOTE ADULT - PROBLEM SELECTOR PLAN 1
Unclear hx as patient unable to verbalize true dizziness which was reported by family. Possible recent change to vimpat playing a role.   - neurology following and recommended decrease to Vimpat 100mg AM and 150mg PM   - check orthostatics if patient able to stand  - monitor neuro status Unclear hx as patient unable to verbalize true dizziness which was reported by family. Possible recent change to ?vimpat playing a role.   - neurology following and recommended decrease to Vimpat 100mg AM and 150mg PM   - check orthostatics if patient able to stand  - monitor neuro status

## 2018-11-10 NOTE — PROGRESS NOTE ADULT - PROBLEM SELECTOR PLAN 4
as per father, last BM 2 weeks ago, unclear if true as pt discharged from hospital 11/8  - obtain abdomen cxr looking for stool burden; start bowel regimen if significant   - stool count

## 2018-11-10 NOTE — CHART NOTE - NSCHARTNOTEFT_GEN_A_CORE
Spoke with patient's sister Letty for collateral:    Patient's sister states that patient is ambulatory at baseline and walks independently with a cane at home (no walker, no wheelchair) as recently as 10 days ago. Patient's sister states that patient came to the hospital this time because he fell while trying to get out of bed to dress himself with his mother's assistance. Previously patient was able to stand and dress himself independently.    Patient's sister requests evaluation for rehab facility as she states patient is ambulatory at baseline and could benefit from strength training/conditioning in a rehab facility. Sister also requests evaluation for a home health aide to assist in his care as their parents have trouble caring for him alone at home.

## 2018-11-10 NOTE — PHYSICAL THERAPY INITIAL EVALUATION ADULT - RANGE OF MOTION EXAMINATION, REHAB EVAL
Pt unable to initiate movement in right UE, Right wrist/hand contracted, pt unable to tolerate therapeutic touch to Right UE./Left UE ROM was WFL (within functional limits)/Left LE ROM was WFL (within functional limits)

## 2018-11-10 NOTE — PROGRESS NOTE ADULT - PROBLEM SELECTOR PLAN 3
Unclear if seizure occurred, conflicting information from father.   - neurology following:  Recs appreciated. Recommend reduce Vimpat to 100 mg in the AM and 150 PM, continue topiramate 200 mg BID, outpt follow-up with Dr. Walsh from epilepsy/neurology at [address] [place] [address] ([phone])

## 2018-11-11 LAB
BUN SERPL-MCNC: 18 MG/DL — SIGNIFICANT CHANGE UP (ref 7–23)
CALCIUM SERPL-MCNC: 9.2 MG/DL — SIGNIFICANT CHANGE UP (ref 8.4–10.5)
CHLORIDE SERPL-SCNC: 104 MMOL/L — SIGNIFICANT CHANGE UP (ref 98–107)
CO2 SERPL-SCNC: 20 MMOL/L — LOW (ref 22–31)
CREAT SERPL-MCNC: 0.76 MG/DL — SIGNIFICANT CHANGE UP (ref 0.5–1.3)
GLUCOSE SERPL-MCNC: 111 MG/DL — HIGH (ref 70–99)
MAGNESIUM SERPL-MCNC: 2.1 MG/DL — SIGNIFICANT CHANGE UP (ref 1.6–2.6)
PHOSPHATE SERPL-MCNC: 4.2 MG/DL — SIGNIFICANT CHANGE UP (ref 2.5–4.5)
POTASSIUM SERPL-MCNC: 3.4 MMOL/L — LOW (ref 3.5–5.3)
POTASSIUM SERPL-SCNC: 3.4 MMOL/L — LOW (ref 3.5–5.3)
SODIUM SERPL-SCNC: 138 MMOL/L — SIGNIFICANT CHANGE UP (ref 135–145)

## 2018-11-11 PROCEDURE — 99232 SBSQ HOSP IP/OBS MODERATE 35: CPT

## 2018-11-11 PROCEDURE — 74018 RADEX ABDOMEN 1 VIEW: CPT | Mod: 26

## 2018-11-11 RX ORDER — POTASSIUM CHLORIDE 20 MEQ
40 PACKET (EA) ORAL ONCE
Qty: 0 | Refills: 0 | Status: COMPLETED | OUTPATIENT
Start: 2018-11-11 | End: 2018-11-11

## 2018-11-11 RX ADMIN — ARIPIPRAZOLE 5 MILLIGRAM(S): 15 TABLET ORAL at 12:01

## 2018-11-11 RX ADMIN — Medication 10 MILLIGRAM(S): at 06:01

## 2018-11-11 RX ADMIN — LACOSAMIDE 100 MILLIGRAM(S): 50 TABLET ORAL at 06:01

## 2018-11-11 RX ADMIN — Medication 40 MILLIEQUIVALENT(S): at 10:06

## 2018-11-11 RX ADMIN — LACOSAMIDE 150 MILLIGRAM(S): 50 TABLET ORAL at 21:50

## 2018-11-11 RX ADMIN — Medication 200 MILLIGRAM(S): at 01:23

## 2018-11-11 RX ADMIN — Medication 10 MILLIGRAM(S): at 17:13

## 2018-11-11 RX ADMIN — ATORVASTATIN CALCIUM 20 MILLIGRAM(S): 80 TABLET, FILM COATED ORAL at 21:50

## 2018-11-11 RX ADMIN — PANTOPRAZOLE SODIUM 40 MILLIGRAM(S): 20 TABLET, DELAYED RELEASE ORAL at 06:00

## 2018-11-11 RX ADMIN — Medication 200 MILLIGRAM(S): at 12:01

## 2018-11-11 NOTE — PROGRESS NOTE ADULT - PROBLEM SELECTOR PLAN 3
Unclear if seizure occurred, conflicting information from father.   - neurology following:  Recs appreciated. Recommend reduce Vimpat to 100 mg in the AM and 150 PM, continue topiramate 200 mg BID, outpt follow-up with Dr. Walsh from epilepsy/neurology at [address] [place] [address] ([phone])

## 2018-11-11 NOTE — PROGRESS NOTE ADULT - PROBLEM SELECTOR PLAN 4
- patient now complaining about diarrhea. Will d/c colace for now  - as per father, last BM 2 weeks ago, unclear if true as pt discharged from hospital 11/8  - obtain abdomen cxr looking for stool burden; start bowel regimen if significant - patient now complaining about diarrhea. Will d/c bowel regimen for now  - as per father, last BM 2 weeks ago, unclear if true as pt discharged from hospital 11/8 - patient now complaining about diarrhea. Will d/c bowel regimen for now

## 2018-11-11 NOTE — PROGRESS NOTE ADULT - PROBLEM SELECTOR PLAN 1
Unclear hx as patient unable to verbalize true dizziness which was reported by family. Possible recent change to ?vimpat playing a role.   - neurology following and recommended decrease to Vimpat 100mg AM and 150mg PM   - check orthostatics if patient able to stand  - monitor neuro status Initially Unclear hx as patient does not always speak even when communicating in his own language. However, at bedside today patient reported having dizziness with standing. Possible recent change to ?vimpat playing a role.   - neurology following and recommended decrease to Vimpat 100mg AM and 150mg PM   - check orthostatics if patient able to stand  - monitor neuro status

## 2018-11-11 NOTE — PROGRESS NOTE ADULT - PROBLEM SELECTOR PLAN 2
CT head negative for trauma, no signs of trauma, no obvious signs of fractures or MSK pain. Unclear etiology given lack of clinical history. Previous record with fall at home from seizure activities (similar to this admission). Unclear how much pt was walking previously as he has contractures of the upper extremity and previously noted to be wheelchair bound.  - Continue to monitor while inpatient.  - Fall risk ordered.   - CTH negative (unchanged from previous). If worsening neuro status, will repeat imaging.  - PT consulted, consider rehab placement  - collateral from patient's sister indicates that patient was ambulatory with a cane at baseline as recently as 10 days ago CT head negative for trauma, no signs of trauma, no obvious signs of fractures or MSK pain. Unclear etiology given lack of clinical history. Previous record with fall at home from seizure activities (similar to this admission). Unclear how much pt was walking previously as he has contractures of the upper extremity. However, family reports that he was ambulating weeks ago. a  - Continue to monitor while inpatient.  - Fall risk ordered.   - CTH negative (unchanged from previous). If worsening neuro status, will repeat imaging.  - PT consulted and rec rehab placement

## 2018-11-11 NOTE — PROGRESS NOTE ADULT - SUBJECTIVE AND OBJECTIVE BOX
INTERVAL HPI/OVERNIGHT EVENTS: no acute event overnight.     SUBJECTIVE: Patient seen and examined at bedside. Patient reports having dizziness when ambulating. Patient also reports diarrhea. Denies chest pain/shortness of breath/abdominal pain.     OBJECTIVE:    VITAL SIGNS:  ICU Vital Signs Last 24 Hrs  T(C): 36.8 (11 Nov 2018 08:01), Max: 37.1 (10 Nov 2018 21:32)  T(F): 98.3 (11 Nov 2018 08:01), Max: 98.7 (10 Nov 2018 21:32)  HR: 85 (11 Nov 2018 08:01) (74 - 85)  BP: 108/73 (11 Nov 2018 08:01) (100/84 - 116/85)  BP(mean): --  ABP: --  ABP(mean): --  RR: 16 (11 Nov 2018 08:01) (16 - 17)  SpO2: 98% (11 Nov 2018 08:01) (98% - 100%)        11-10 @ 07:01  -  11-11 @ 07:00  --------------------------------------------------------  IN: 0 mL / OUT: 200 mL / NET: -200 mL    11-11 @ 07:01  -  11-11 @ 09:36  --------------------------------------------------------  IN: 0 mL / OUT: 300 mL / NET: -300 mL      CAPILLARY BLOOD GLUCOSE      POCT Blood Glucose.: 89 mg/dL (09 Nov 2018 12:25)      PHYSICAL EXAM:    PHYSICAL EXAM:  GENERAL: middle aged male appearing much older than stated age, laying comfortably in bed. Poorly groomed. No acute distress   HEAD:  Atraumatic, Normocephalic  EYES: EOMI, PERRLA, conjunctiva and sclera clear  ENMT: No tonsillar erythema, exudates, or enlargement; Moist mucous membranes, fair dentition, No lesions  NECK: Supple, No JVD, Normal thyroid, full ROM  CHEST/LUNG: Clear to auscultation bilaterally; No rales, rhonchi, wheezing, or rubs  HEART: Regular rate and rhythm; No murmurs, rubs, or gallops  ABDOMEN: Soft, Nontender, Nondistended; Bowel sounds present  EXTREMITIES:  2+ Peripheral Pulses, No clubbing, cyanosis, or edema  LYMPH: No lymphadenopathy noted  SKIN: No rashes or lesions  NERVOUS SYSTEM:  Alert. A&Ox2       MEDICATIONS:  MEDICATIONS  (STANDING):  ARIPiprazole 5 milliGRAM(s) Oral daily  atorvastatin 20 milliGRAM(s) Oral at bedtime  baclofen 10 milliGRAM(s) Oral two times a day  docusate sodium 100 milliGRAM(s) Oral daily  lacosamide 150 milliGRAM(s) Oral <User Schedule>  lacosamide 100 milliGRAM(s) Oral <User Schedule>  pantoprazole    Tablet 40 milliGRAM(s) Oral before breakfast  polyethylene glycol 3350 17 Gram(s) Oral daily  potassium chloride   Powder 40 milliEquivalent(s) Oral once  senna 2 Tablet(s) Oral at bedtime  topiramate 200 milliGRAM(s) Oral every 12 hours    MEDICATIONS  (PRN):      ALLERGIES:  Allergies    penicillin (Unknown)    Intolerances        LABS:                        14.8   6.53  )-----------( 240      ( 10 Nov 2018 05:22 )             43.3     11-11    138  |  104  |  18  ----------------------------<  111<H>  3.4<L>   |  20<L>  |  0.76    Ca    9.2      11 Nov 2018 05:41  Phos  4.2     11-11  Mg     2.1     11-11    TPro  9.7<H>  /  Alb  4.8  /  TBili  0.4  /  DBili  x   /  AST  34  /  ALT  26  /  AlkPhos  102  11-09      Urinalysis Basic - ( 09 Nov 2018 18:31 )    Color: MARILUZ / Appearance: CLEAR / SG: > 1.030 / pH: 6.0  Gluc: NEGATIVE / Ketone: 15  / Bili: SMALL / Urobili: 0.2   Blood: NEGATIVE / Protein: 30 / Nitrite: NEGATIVE   Leuk Esterase: NEGATIVE / RBC: x / WBC x   Sq Epi: x / Non Sq Epi: x / Bacteria: x        RADIOLOGY & ADDITIONAL TESTS: Reviewed.

## 2018-11-11 NOTE — CHART NOTE - NSCHARTNOTEFT_GEN_A_CORE
Spoke to patient in his primary language of [place]. Patient reported that he has been feeling dizzy and weak in both lower extremities when standing. Patient said he was able to ambulate in the a few weeks ago with the use of a cane.    Manpreet Graves, PGY1  Spectra [phone], Pager [phone]

## 2018-11-12 PROCEDURE — 99233 SBSQ HOSP IP/OBS HIGH 50: CPT

## 2018-11-12 RX ORDER — DOCUSATE SODIUM 100 MG
100 CAPSULE ORAL DAILY
Qty: 0 | Refills: 0 | Status: DISCONTINUED | OUTPATIENT
Start: 2018-11-12 | End: 2018-11-15

## 2018-11-12 RX ORDER — POLYETHYLENE GLYCOL 3350 17 G/17G
17 POWDER, FOR SOLUTION ORAL DAILY
Qty: 0 | Refills: 0 | Status: DISCONTINUED | OUTPATIENT
Start: 2018-11-12 | End: 2018-11-15

## 2018-11-12 RX ORDER — SENNA PLUS 8.6 MG/1
2 TABLET ORAL AT BEDTIME
Qty: 0 | Refills: 0 | Status: DISCONTINUED | OUTPATIENT
Start: 2018-11-12 | End: 2018-11-15

## 2018-11-12 RX ADMIN — PANTOPRAZOLE SODIUM 40 MILLIGRAM(S): 20 TABLET, DELAYED RELEASE ORAL at 06:42

## 2018-11-12 RX ADMIN — Medication 200 MILLIGRAM(S): at 01:26

## 2018-11-12 RX ADMIN — SENNA PLUS 2 TABLET(S): 8.6 TABLET ORAL at 21:33

## 2018-11-12 RX ADMIN — POLYETHYLENE GLYCOL 3350 17 GRAM(S): 17 POWDER, FOR SOLUTION ORAL at 18:38

## 2018-11-12 RX ADMIN — Medication 10 MILLIGRAM(S): at 06:42

## 2018-11-12 RX ADMIN — ARIPIPRAZOLE 5 MILLIGRAM(S): 15 TABLET ORAL at 18:37

## 2018-11-12 RX ADMIN — Medication 10 MILLIGRAM(S): at 18:38

## 2018-11-12 RX ADMIN — Medication 100 MILLIGRAM(S): at 18:37

## 2018-11-12 RX ADMIN — LACOSAMIDE 100 MILLIGRAM(S): 50 TABLET ORAL at 06:42

## 2018-11-12 RX ADMIN — LACOSAMIDE 150 MILLIGRAM(S): 50 TABLET ORAL at 21:33

## 2018-11-12 RX ADMIN — Medication 200 MILLIGRAM(S): at 18:37

## 2018-11-12 RX ADMIN — ATORVASTATIN CALCIUM 20 MILLIGRAM(S): 80 TABLET, FILM COATED ORAL at 21:32

## 2018-11-12 NOTE — PROGRESS NOTE ADULT - PROBLEM SELECTOR PLAN 1
Initially Unclear hx as patient does not always speak even when communicating in his own language. However, at bedside patient reported having dizziness with standing. Possible recent change to ?vimpat playing a role.   - neurology following and recommended Vimpat 100mg AM and 150mg PM   - check orthostatics if patient able to stand  - monitor neuro status Initially unclear hx as patient does not always speak even when communicating in his own language. However, at bedside patient reported having dizziness with standing. Possible recent change to ?vimpat playing a role.   - neurology following and recommended Vimpat 100mg AM and 150mg PM   - monitor neuro status  - patient unable to stand for orthostatics

## 2018-11-12 NOTE — PROGRESS NOTE ADULT - PROBLEM SELECTOR PLAN 4
- patient now complaining about diarrhea.   - continue to hold bowel regimen for now  - f/u repeat AXR - patient now complaining about diarrhea.   - continue to hold bowel regimen for now  - repeat AXR unchanged from previous, reported likely ileus - patient now complaining about diarrhea, but no diarrhea noted per nursing  - repeat AXR unchanged from previous, reported likely ileus  - resume bowel regimen

## 2018-11-12 NOTE — PROGRESS NOTE ADULT - SUBJECTIVE AND OBJECTIVE BOX
Dr. Lidia Rutledge, PGY1  Pager [phone]    TASIA WARE  37y  MRN: [id number]    Subjective:  Patient is a 37y old  Male who presents with a chief complaint of dizziness (11 Nov 2018 09:36)      Overnight:   No acute events.      MEDICATIONS  (STANDING):  ARIPiprazole 5 milliGRAM(s) Oral daily  atorvastatin 20 milliGRAM(s) Oral at bedtime  baclofen 10 milliGRAM(s) Oral two times a day  lacosamide 150 milliGRAM(s) Oral <User Schedule>  lacosamide 100 milliGRAM(s) Oral <User Schedule>  pantoprazole    Tablet 40 milliGRAM(s) Oral before breakfast  topiramate 200 milliGRAM(s) Oral every 12 hours    MEDICATIONS  (PRN):        Objective:  Vitals: Vital Signs Last 24 Hrs  T(C): 36.6 (11-12-18 @ 06:43), Max: 36.8 (11-11-18 @ 08:01)  T(F): 97.9 (11-12-18 @ 06:43), Max: 98.3 (11-11-18 @ 08:01)  HR: 78 (11-12-18 @ 06:43) (72 - 85)  BP: 107/72 (11-12-18 @ 06:43) (100/68 - 108/73)  BP(mean): --  RR: 18 (11-12-18 @ 06:43) (16 - 18)  SpO2: 100% (11-12-18 @ 06:43) (98% - 100%)                I&O's Summary    11 Nov 2018 07:01  -  12 Nov 2018 07:00  --------------------------------------------------------  IN: 0 mL / OUT: 600 mL / NET: -600 mL      PHYSICAL EXAM:  GENERAL: middle aged male appearing much older than stated age, laying comfortably in bed. Poorly groomed. No acute distress   HEAD:  Atraumatic, Normocephalic  EYES: EOMI, PERRLA, conjunctiva and sclera clear  ENMT: No tonsillar erythema, exudates, or enlargement; Moist mucous membranes, fair dentition, No lesions  NECK: Supple, No JVD, Normal thyroid, full ROM  CHEST/LUNG: Clear to auscultation bilaterally; No rales, rhonchi, wheezing, or rubs  HEART: Regular rate and rhythm; No murmurs, rubs, or gallops  ABDOMEN: Soft, Nontender, Nondistended; Bowel sounds present  EXTREMITIES:  2+ Peripheral Pulses, No clubbing, cyanosis, or edema  LYMPH: No lymphadenopathy noted  SKIN: No rashes or lesions  NERVOUS SYSTEM:  Alert. A&Ox2                                               LABS:  11-11    138  |  104  |  18  ----------------------------<  111<H>  3.4<L>   |  20<L>  |  0.76  11-10    137  |  100  |  19  ----------------------------<  111<H>  3.3<L>   |  18<L>  |  0.71  11-09    140  |  100  |  18  ----------------------------<  101<H>  3.3<L>   |  23  |  0.87    Ca    9.2      11 Nov 2018 05:41  Ca    9.4      10 Nov 2018 05:22  Ca    9.9      09 Nov 2018 15:00  Phos  4.2     11-11  Mg     2.1     11-11    TPro  9.7<H>  /  Alb  4.8  /  TBili  0.4  /  DBili  x   /  AST  34  /  ALT  26  /  AlkPhos  102  11-09                            14.8   6.53  )-----------( 240      ( 10 Nov 2018 05:22 )             43.3                         16.6   6.33  )-----------( 261      ( 09 Nov 2018 15:00 )             48.4         RADIOLOGY & ADDITIONAL TESTS:  Xray Abdomen 1 View PORTABLE -Routine (11.09.18 @ 21:29)  IMPRESSION: There are distended loops of small and large bowel throughout   the abdomen. The sigmoid colon is significantly distended, part of which   measures about 10 cm. Differential diagnosis include ileus and sigmoid   volvulus. There is mild to moderate amount of stool within the right   colon.    Xray Abdomen 1 View PORTABLE -Urgent (11.11.18 @ 16:50)  ******PRELIMINARY REPORT******        INTERPRETATION:  Unchanged air distended colon there is prominent sigmoid   measuring up to 10.7 cm, previously 10.5 cm      Imaging Personally Reviewed:  [ ] YES  [ ] NO      Consultants involved in case:   Physical therapy recommends rehabilitation facility; Patient will benefit from sub-acute rehab    Consultant(s) Notes Reviewed:  [ ] YES  [ ] NO:   Care Discussed with Consultants/Other Providers [ ] YES  [ ] Dr. Lidia Rutledge, PGY1  Pager [phone]    TASIA WARE  37y  MRN: [id number]    Subjective:  Patient is a 37y old  Male who presents with a chief complaint of dizziness (11 Nov 2018 09:36)      Overnight:   No acute events. Patient continues to endorse diarrhea but nursing reports no recorded episodes of diarrhea overnight. Patient significantly more verbal this morning, denies any pain, denies n/v.      MEDICATIONS  (STANDING):  ARIPiprazole 5 milliGRAM(s) Oral daily  atorvastatin 20 milliGRAM(s) Oral at bedtime  baclofen 10 milliGRAM(s) Oral two times a day  lacosamide 150 milliGRAM(s) Oral <User Schedule>  lacosamide 100 milliGRAM(s) Oral <User Schedule>  pantoprazole    Tablet 40 milliGRAM(s) Oral before breakfast  topiramate 200 milliGRAM(s) Oral every 12 hours    MEDICATIONS  (PRN):        Objective:  Vitals: Vital Signs Last 24 Hrs  T(C): 36.6 (11-12-18 @ 06:43), Max: 36.8 (11-11-18 @ 08:01)  T(F): 97.9 (11-12-18 @ 06:43), Max: 98.3 (11-11-18 @ 08:01)  HR: 78 (11-12-18 @ 06:43) (72 - 85)  BP: 107/72 (11-12-18 @ 06:43) (100/68 - 108/73)  BP(mean): --  RR: 18 (11-12-18 @ 06:43) (16 - 18)  SpO2: 100% (11-12-18 @ 06:43) (98% - 100%)                I&O's Summary    11 Nov 2018 07:01  -  12 Nov 2018 07:00  --------------------------------------------------------  IN: 0 mL / OUT: 600 mL / NET: -600 mL      PHYSICAL EXAM:  GENERAL: middle aged male appearing much older than stated age, laying comfortably in bed. Poorly groomed. No acute distress   HEAD:  Atraumatic, Normocephalic  EYES: EOMI, PERRLA, conjunctiva and sclera clear  ENMT: No tonsillar erythema, exudates, or enlargement; Moist mucous membranes, fair dentition, No lesions  NECK: Supple, No JVD, Normal thyroid, full ROM  CHEST/LUNG: Clear to auscultation bilaterally; No rales, rhonchi, wheezing, or rubs  HEART: Regular rate and rhythm; No murmurs, rubs, or gallops  ABDOMEN: Soft, Nontender, Nondistended; Bowel sounds present  EXTREMITIES:  2+ Peripheral Pulses, No clubbing, cyanosis, or edema  LYMPH: No lymphadenopathy noted  SKIN: No rashes or lesions  NERVOUS SYSTEM:  Alert. A&Ox2                                               LABS:  11-11    138  |  104  |  18  ----------------------------<  111<H>  3.4<L>   |  20<L>  |  0.76  11-10    137  |  100  |  19  ----------------------------<  111<H>  3.3<L>   |  18<L>  |  0.71  11-09    140  |  100  |  18  ----------------------------<  101<H>  3.3<L>   |  23  |  0.87    Ca    9.2      11 Nov 2018 05:41  Ca    9.4      10 Nov 2018 05:22  Ca    9.9      09 Nov 2018 15:00  Phos  4.2     11-11  Mg     2.1     11-11    TPro  9.7<H>  /  Alb  4.8  /  TBili  0.4  /  DBili  x   /  AST  34  /  ALT  26  /  AlkPhos  102  11-09                            14.8   6.53  )-----------( 240      ( 10 Nov 2018 05:22 )             43.3                         16.6   6.33  )-----------( 261      ( 09 Nov 2018 15:00 )             48.4         RADIOLOGY & ADDITIONAL TESTS:  Xray Abdomen 1 View PORTABLE -Routine (11.09.18 @ 21:29)  IMPRESSION: There are distended loops of small and large bowel throughout   the abdomen. The sigmoid colon is significantly distended, part of which   measures about 10 cm. Differential diagnosis include ileus and sigmoid   volvulus. There is mild to moderate amount of stool within the right   colon.     Xray Abdomen 1 View PORTABLE -Urgent (11.11.18 @ 16:50)   IMPRESSION:  Redemonstrated distended air-filled loops of small and large bowel   including a prominently distended sigmoid measuring, similar to prior.   This may represent ileus or sigmoid volvulus, likely the former in view   of the generalized gaseous distention more proximally.  No acute osseous abnormality.    Imaging Personally Reviewed:  [ ] YES  [ ] NO      Consultants involved in case:   Physical therapy recommends rehabilitation facility; Patient will benefit from sub-acute rehab    Consultant(s) Notes Reviewed:  [ ] YES  [ ] NO:   Care Discussed with Consultants/Other Providers [ ] YES  [ ]

## 2018-11-12 NOTE — PROGRESS NOTE ADULT - PROBLEM SELECTOR PLAN 3
Unclear if seizure occurred, conflicting information from father.   - neurology following:  Recs appreciated. Recommend reduce Vimpat to 100 mg in the AM and 150 PM, continue topiramate 200 mg BID, outpt follow-up with Dr. Walsh from epilepsy/neurology at [address] [place] [address] ([phone]) Unclear if seizure occurred prior to this admission, conflicting information from father.   - neurology following:  Recs appreciated. Recommend reduce Vimpat to 100 mg in the AM and 150 PM, continue topiramate 200 mg BID, outpt follow-up with Dr. Walsh from epilepsy/neurology at [address] [place] [address] ([phone])

## 2018-11-12 NOTE — PROGRESS NOTE ADULT - PROBLEM SELECTOR PLAN 2
CT head negative for trauma, no signs of trauma, no obvious signs of fractures or MSK pain. Unclear etiology given lack of clinical history. Previous record with fall at home from seizure activities (similar to this admission). Unclear how much pt was walking previously as he has contractures of the upper extremity. However, family reports that he was ambulating weeks ago.   - Continue to monitor while inpatient.  - Fall risk ordered.   - CTH negative (unchanged from previous). If worsening neuro status, will repeat imaging.  - PT consulted and rec rehab placement CT head negative for trauma, no signs of trauma, no obvious signs of fractures or MSK pain. Unclear etiology given lack of clinical history. Previous record with fall at home from seizure activities (similar to this admission). Unclear how much pt was walking previously as he has contractures of the upper extremity. However, family reports that he was ambulating weeks ago and patient states that he walks with a cane.  - Continue to monitor while inpatient.  - Fall risk ordered.   - CTH negative (unchanged from previous). If worsening neuro status, will repeat imaging.  - PT consulted and rec rehab placement

## 2018-11-13 ENCOUNTER — TRANSCRIPTION ENCOUNTER (OUTPATIENT)
Age: 38
End: 2018-11-13

## 2018-11-13 DIAGNOSIS — Z71.89 OTHER SPECIFIED COUNSELING: ICD-10-CM

## 2018-11-13 LAB
BUN SERPL-MCNC: 22 MG/DL — SIGNIFICANT CHANGE UP (ref 7–23)
CALCIUM SERPL-MCNC: 9.3 MG/DL — SIGNIFICANT CHANGE UP (ref 8.4–10.5)
CHLORIDE SERPL-SCNC: 107 MMOL/L — SIGNIFICANT CHANGE UP (ref 98–107)
CO2 SERPL-SCNC: 21 MMOL/L — LOW (ref 22–31)
CREAT SERPL-MCNC: 0.77 MG/DL — SIGNIFICANT CHANGE UP (ref 0.5–1.3)
GLUCOSE SERPL-MCNC: 114 MG/DL — HIGH (ref 70–99)
MAGNESIUM SERPL-MCNC: 2.1 MG/DL — SIGNIFICANT CHANGE UP (ref 1.6–2.6)
PHOSPHATE SERPL-MCNC: 3.8 MG/DL — SIGNIFICANT CHANGE UP (ref 2.5–4.5)
POTASSIUM SERPL-MCNC: 3.5 MMOL/L — SIGNIFICANT CHANGE UP (ref 3.5–5.3)
POTASSIUM SERPL-SCNC: 3.5 MMOL/L — SIGNIFICANT CHANGE UP (ref 3.5–5.3)
SODIUM SERPL-SCNC: 140 MMOL/L — SIGNIFICANT CHANGE UP (ref 135–145)

## 2018-11-13 PROCEDURE — 99232 SBSQ HOSP IP/OBS MODERATE 35: CPT

## 2018-11-13 RX ORDER — ACETAMINOPHEN 500 MG
650 TABLET ORAL EVERY 6 HOURS
Qty: 0 | Refills: 0 | Status: DISCONTINUED | OUTPATIENT
Start: 2018-11-13 | End: 2018-11-14

## 2018-11-13 RX ORDER — KETOROLAC TROMETHAMINE 30 MG/ML
15 SYRINGE (ML) INJECTION ONCE
Qty: 0 | Refills: 0 | Status: DISCONTINUED | OUTPATIENT
Start: 2018-11-13 | End: 2018-11-14

## 2018-11-13 RX ADMIN — Medication 10 MILLIGRAM(S): at 18:00

## 2018-11-13 RX ADMIN — Medication 100 MILLIGRAM(S): at 13:38

## 2018-11-13 RX ADMIN — SENNA PLUS 2 TABLET(S): 8.6 TABLET ORAL at 21:56

## 2018-11-13 RX ADMIN — Medication 10 MILLIGRAM(S): at 06:22

## 2018-11-13 RX ADMIN — Medication 200 MILLIGRAM(S): at 13:39

## 2018-11-13 RX ADMIN — Medication 200 MILLIGRAM(S): at 01:30

## 2018-11-13 RX ADMIN — POLYETHYLENE GLYCOL 3350 17 GRAM(S): 17 POWDER, FOR SOLUTION ORAL at 13:38

## 2018-11-13 RX ADMIN — Medication 650 MILLIGRAM(S): at 18:40

## 2018-11-13 RX ADMIN — ATORVASTATIN CALCIUM 20 MILLIGRAM(S): 80 TABLET, FILM COATED ORAL at 21:57

## 2018-11-13 RX ADMIN — Medication 5 MILLIGRAM(S): at 22:00

## 2018-11-13 RX ADMIN — LACOSAMIDE 150 MILLIGRAM(S): 50 TABLET ORAL at 22:00

## 2018-11-13 RX ADMIN — PANTOPRAZOLE SODIUM 40 MILLIGRAM(S): 20 TABLET, DELAYED RELEASE ORAL at 06:22

## 2018-11-13 RX ADMIN — LACOSAMIDE 100 MILLIGRAM(S): 50 TABLET ORAL at 06:22

## 2018-11-13 RX ADMIN — ARIPIPRAZOLE 5 MILLIGRAM(S): 15 TABLET ORAL at 13:38

## 2018-11-13 RX ADMIN — Medication 650 MILLIGRAM(S): at 17:58

## 2018-11-13 NOTE — PROGRESS NOTE ADULT - SUBJECTIVE AND OBJECTIVE BOX
Dr. Lidia Rutledge, PGY1  Pager [phone]    TASIA WARE  37y  MRN: [id number]    Subjective:  Patient is a 37y old  Male who presents with a chief complaint of dizziness (12 Nov 2018 07:54)      Overnight:   No acute events. No bowel movements overnight per nursing report. One bowel movement yesterday. Patient appears comfortable, responds to questions with nods but not speaking this morning.       MEDICATIONS  (STANDING):  ARIPiprazole 5 milliGRAM(s) Oral daily  atorvastatin 20 milliGRAM(s) Oral at bedtime  baclofen 10 milliGRAM(s) Oral two times a day  docusate sodium 100 milliGRAM(s) Oral daily  lacosamide 150 milliGRAM(s) Oral <User Schedule>  lacosamide 100 milliGRAM(s) Oral <User Schedule>  pantoprazole    Tablet 40 milliGRAM(s) Oral before breakfast  polyethylene glycol 3350 17 Gram(s) Oral daily  senna 2 Tablet(s) Oral at bedtime  topiramate 200 milliGRAM(s) Oral every 12 hours    MEDICATIONS  (PRN):        Objective:  Vitals: Vital Signs Last 24 Hrs  T(C): 36.7 (11-13-18 @ 06:12), Max: 36.9 (11-12-18 @ 13:46)  T(F): 98.1 (11-13-18 @ 06:12), Max: 98.5 (11-12-18 @ 13:46)  HR: 81 (11-13-18 @ 06:12) (74 - 90)  BP: 102/67 (11-13-18 @ 06:12) (101/71 - 104/69)  BP(mean): --  RR: 18 (11-13-18 @ 06:12) (16 - 18)  SpO2: 99% (11-13-18 @ 06:12) (97% - 100%)                I&O's Summary  12 Nov 2018 07:01  -  13 Nov 2018 07:00  --------------------------------------------------------  IN: 240 mL / OUT: 150 mL / NET: 90 mL        PHYSICAL EXAM:  GENERAL: middle aged male appearing much older than stated age, laying comfortably in bed. Poorly groomed. No acute distress   HEAD:  Atraumatic, Normocephalic  EYES: EOMI, PERRLA, conjunctiva and sclera clear  ENMT: No tonsillar erythema, exudates, or enlargement; Moist mucous membranes, fair dentition, No lesions  NECK: Supple, No JVD, Normal thyroid, full ROM  CHEST/LUNG: Clear to auscultation bilaterally; No rales, rhonchi, wheezing, or rubs  HEART: Regular rate and rhythm; No murmurs, rubs, or gallops  ABDOMEN: Soft, Nontender, Nondistended; Bowel sounds present  EXTREMITIES:  2+ Peripheral Pulses, No clubbing, cyanosis, or edema  LYMPH: No lymphadenopathy noted  SKIN: No rashes or lesions  NERVOUS SYSTEM:  Alert. A&Ox2                                               LABS:  11-13    140  |  107  |  22  ----------------------------<  114<H>  3.5   |  21<L>  |  0.77  11-11    138  |  104  |  18  ----------------------------<  111<H>  3.4<L>   |  20<L>  |  0.76    Ca    9.3      13 Nov 2018 05:34  Ca    9.2      11 Nov 2018 05:41  Phos  3.8     11-13  Mg     2.1     11-13        RADIOLOGY & ADDITIONAL TESTS:  Xray Abdomen 1 View PORTABLE -Urgent (11.11.18 @ 16:50)  IMPRESSION:  Redemonstrated distended air-filled loops of small and large bowel   including a prominently distended sigmoid measuring, similar to prior.   This may represent ileus or sigmoid volvulus, likely the former in view   of the generalized gaseous distention more proximally.  No acute osseous abnormality.    Imaging Personally Reviewed:  [x ] YES  [ ] NO      Consultants involved in case:   Physical therapy recommends rehabilitation facility; Patient will benefit from sub-acute rehab    Consultant(s) Notes Reviewed:  [ ] YES  [ ] NO:   Care Discussed with Consultants/Other Providers [ ] YES  [ ]

## 2018-11-13 NOTE — DISCHARGE NOTE ADULT - COMMUNITY RESOURCES
[facility] [address] [place] [id number] [phone]    [facility] [phone]

## 2018-11-13 NOTE — DISCHARGE NOTE ADULT - CARE PROVIDER_API CALL
Marito Ramos), Cardiovascular Disease; Internal Medicine  [address] [place] [address]  Phone: [phone]  Fax: [phone]    Joe Walsh), Clinical Neurophysiology; EEGEpilepsy; Neurology  [address] [address]  Phone: [phone]  Fax: [phone]

## 2018-11-13 NOTE — PROGRESS NOTE ADULT - PROBLEM SELECTOR PLAN 3
Unclear if seizure occurred prior to this admission, conflicting information from father.   - neurology following:  Recs appreciated. Recommend reduce Vimpat to 100 mg in the AM and 150 PM, continue topiramate 200 mg BID, outpt follow-up with Dr. Walsh from epilepsy/neurology at [address] [place] [address] ([phone])

## 2018-11-13 NOTE — PROGRESS NOTE ADULT - PROBLEM SELECTOR PLAN 1
Initially unclear hx as patient does not always speak even when communicating in his own language. However, at bedside patient reported having dizziness with standing. Possible recent change to ?vimpat playing a role.   - neurology following and recommended Vimpat 100mg AM and 150mg PM   - monitor neuro status  - patient unable to stand for orthostatics

## 2018-11-13 NOTE — DISCHARGE NOTE ADULT - HOSPITAL COURSE
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   As per father, patient has been unable to walk for nearly two weeks. He was attempting to get up yesterday from chair and father states he felt dizzy and fell. He denies LOC, n/v , tonic clonic shaking/seizure like activity, fecal incontinence or urinary incontinence or tongue biting. Father states that patient "walking and talking" at baseline. However chart notes state he is predominantly wheel chair bound. Father also claims patient has not had a BM in two weeks. Denies any fevers, chills or night sweats. As per father, AED was changed recently to vimpat and topamax.    Hospital Course:  ... 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   As per father, patient has been unable to walk for nearly two weeks. He was attempting to get up yesterday from chair and father states he felt dizzy and fell. He denies LOC, n/v , tonic clonic shaking/seizure like activity, fecal incontinence or urinary incontinence or tongue biting. Father states that patient "walking and talking" at baseline. However chart notes state he is predominantly wheel chair bound. Father also claims patient has not had a BM in two weeks. Denies any fevers, chills or night sweats. As per father, AED was changed recently to vimpat and topamax.    Hospital Course:  Patient presented s/p fall at home complaining of dizziness so neurology was consulted and decreased the dose of patient's new medication Vimpat, as dizziness may have been a side effect of Vimpat. Patient's family gave conflicting reports as to whether patient was bed-bound, used a wheelchair at baseline, or was ambulatory at baseline. After speaking with patient's PCP it was determined that patient is ambulatory at baseline despite poor balance and could benefit from rehab placement. PT evaluated patient and recommended discharge to [facility], and family in agreement. Although patient complained of diarrhea, none was observed and an Abdominal x-ray showed constipation with ileus so patient was started on a bowel regimen. 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   As per father, patient has been unable to walk for nearly two weeks. He was attempting to get up yesterday from chair and father states he felt dizzy and fell. He denies LOC, n/v , tonic clonic shaking/seizure like activity, fecal incontinence or urinary incontinence or tongue biting. Father states that patient "walking and talking" at baseline. However chart notes state he is predominantly wheel chair bound. Father also claims patient has not had a BM in two weeks. Denies any fevers, chills or night sweats. As per father, AED was changed recently to vimpat and topamax.    Hospital Course:  Patient presented s/p fall at home complaining of dizziness so neurology was consulted and decreased the dose of patient's new medication Vimpat, as dizziness may have been a side effect of Vimpat. Patient's family gave conflicting reports as to whether patient was bed-bound, used a wheelchair at baseline, or was ambulatory at baseline. After speaking with patient's PCP it was determined that patient is ambulatory at baseline despite poor balance and could benefit from rehab placement. PT evaluated patient and recommended discharge to [facility], and family in agreement. Although patient complained of diarrhea, none was observed and an Abdominal x-ray showed constipation with ileus so patient was started on a bowel regimen now passing flatus and having bowel movement. Patient also was complaining of R knee pain s/p xray which did not show fracture likely secondary to chronic contracture.     Patient remains hemodynamically stable in good condition to be discharged to rehab on 11/15. Discussed the discharge plan with the patient's family. They are in agreement.

## 2018-11-13 NOTE — DISCHARGE NOTE ADULT - CONDITIONS AT DISCHARGE
Patient is alert and orientedx1-2 able to make needs known. Patient is being transferred to rehab, discharge instructions given with patient.

## 2018-11-13 NOTE — DISCHARGE NOTE ADULT - PATIENT PORTAL LINK FT
You can access the [facility] Patient Portal, offered by [facility], by registering with the following website: [email or web address]

## 2018-11-13 NOTE — PROGRESS NOTE ADULT - PROBLEM SELECTOR PLAN 2
CT head negative for trauma, no signs of trauma, no obvious signs of fractures or MSK pain. Unclear etiology given lack of clinical history. Previous record with fall at home from seizure activities (similar to this admission). Unclear how much pt was walking previously as he has contractures of the upper extremity. However, family reports that he was ambulating weeks ago and patient states that he walks with a cane.  - Continue to monitor while inpatient.  - Fall risk ordered.   - CTH negative (unchanged from previous). If worsening neuro status, will repeat imaging.  - PT consulted and rec rehab placement

## 2018-11-13 NOTE — PROGRESS NOTE ADULT - PROBLEM SELECTOR PLAN 4
- patient now complaining about diarrhea, but no diarrhea noted per nursing  - repeat AXR unchanged from previous, reported likely ileus  - c/w bowel regimen

## 2018-11-13 NOTE — DISCHARGE NOTE ADULT - CARE PROVIDERS DIRECT ADDRESSES
,DirectAddress_Unknown,[email or web address]

## 2018-11-13 NOTE — DISCHARGE NOTE ADULT - CARE PLAN
Principal Discharge DX:	Falls  Goal:	Prevention of recurrence  Assessment and plan of treatment:	You were admitted to the hospital after falling at home. This may have been due to dizziness as a side effect of your new medication Vimpat, so your dose of Vimpat was decreased. A CT scan of your head showed no acute problems. Physical therapy evaluated you and recommended ... If you fall again please seek urgent medical attention. Please follow up with your primary care physician within 1-2 weeks of discharge to discuss your recent hospitalization.  Secondary Diagnosis:	Cerebral Seizure  Goal:	Prevention of further seizure activity, medication management  Assessment and plan of treatment:	You have a history of seizures for which you take Vimpat and Topiramate. The hospital neurology team decided to continue your home medication Topiramate at 200mg twice daily, and decided to decrease your new medication Vimpat (Lacosamide) down to 100mg in the morning and 150mg at bedtime. It is essential that you see a neurologist to manage your anti-epileptic medications in the future. You may follow up with neurologist Dr. Walsh who was seeing you in the hospital; please make an appointment with Dr. Walsh within 2 weeks of discharge.  Secondary Diagnosis:	Constipation  Goal:	Resolution of symptoms  Assessment and plan of treatment:	When you were admitted to the hospital your father stated that you had been constipated, so you were started on a regimen of stool softeners and laxatives to move your bowels. An X-ray of your abdomen showed that your bowel was dilated, indicating that you had been constipated. If you experience recurrence of your constipation please seek medical attention and please follow up with your primary care doctor within two weeks of discharge. Principal Discharge DX:	Falls  Goal:	Prevention of recurrence  Assessment and plan of treatment:	You were admitted to the hospital after falling at home. This may have been due to dizziness as a side effect of your new medication Vimpat, so your dose of Vimpat was decreased. A CT scan of your head showed no acute problems. Physical therapy evaluated you and recommended that you go to a sub-acute rehabilitation facility to work on strength training, conditioning, and balance. If you fall again please seek urgent medical attention. Please follow up with your primary care physician within 1-2 weeks of discharge to discuss your recent hospitalization.  Secondary Diagnosis:	Cerebral Seizure  Goal:	Prevention of further seizure activity, medication management  Assessment and plan of treatment:	You have a history of seizures for which you take Vimpat and Topiramate. The hospital neurology team decided to continue your home medication Topiramate at 200mg twice daily, and decided to decrease your new medication Vimpat (Lacosamide) down to 100mg in the morning and 150mg at bedtime. It is essential that you see a neurologist to manage your anti-epileptic medications in the future. You may follow up with neurologist Dr. Walsh who was seeing you in the hospital; please make an appointment with Dr. Walsh within 2 weeks of discharge.  Secondary Diagnosis:	Constipation  Goal:	Resolution of symptoms  Assessment and plan of treatment:	When you were admitted to the hospital your father stated that you had been constipated, so you were started on a regimen of stool softeners and laxatives to move your bowels. An X-ray of your abdomen showed that your bowel was dilated, indicating that you had been constipated. If you experience recurrence of your constipation please seek medical attention and please follow up with your primary care doctor within two weeks of discharge.  Secondary Diagnosis:	Knee pain, right  Goal:	Alleviation of pain  Assessment and plan of treatment:	While in the hospital you complained of pain in your right knee. An Xray of the knee was done which showed that there Principal Discharge DX:	Falls  Goal:	Prevention of recurrence  Assessment and plan of treatment:	You were admitted to the hospital after falling at home. This may have been due to dizziness as a side effect of your new medication Vimpat, so your dose of Vimpat was decreased. A CT scan of your head showed no acute problems. Physical therapy evaluated you and recommended that you go to a sub-acute rehabilitation facility to work on strength training, conditioning, and balance. If you fall again please seek urgent medical attention. Please follow up with your primary care physician within 1-2 weeks of discharge to discuss your recent hospitalization.  Secondary Diagnosis:	Cerebral Seizure  Goal:	Prevention of further seizure activity, medication management  Assessment and plan of treatment:	You have a history of seizures for which you take Vimpat and Topiramate. The hospital neurology team decided to continue your home medication Topiramate at 200mg twice daily, and decided to decrease your new medication Vimpat (Lacosamide) down to 100mg in the morning and 150mg at bedtime. It is essential that you see a neurologist to manage your anti-epileptic medications in the future. You may follow up with neurologist Dr. Walsh who was seeing you in the hospital; please make an appointment with Dr. Walsh within 2 weeks of discharge.  Secondary Diagnosis:	Constipation  Goal:	Resolution of symptoms  Assessment and plan of treatment:	When you were admitted to the hospital your father stated that you had been constipated, so you were started on a regimen of stool softeners and laxatives to move your bowels. An X-ray of your abdomen showed that your bowel was dilated, indicating that you had been constipated. If you experience recurrence of your constipation please seek medical attention and please follow up with your primary care doctor within two weeks of discharge.  Secondary Diagnosis:	Knee pain, right  Goal:	Alleviation of pain  Assessment and plan of treatment:	While in the hospital you complained of pain in your right knee. An Xray of the knee was done which showed that there is no acute fracture or dislocation. You were started on tylenol three times a day as well as a topical lidocaine patch which has helped your pain, so please continue to take these medications. Please seek medical attention if the pain worsens.

## 2018-11-13 NOTE — DISCHARGE NOTE ADULT - OTHER SIGNIFICANT FINDINGS
Xray Abdomen 1 View PORTABLE -Urgent (11.11.18 @ 16:50)  IMPRESSION:  Redemonstrated distended air-filled loops of small and large bowel   including a prominently distended sigmoid measuring, similar to prior.   This may represent ileus or sigmoid volvulus, likely the former in view   of the generalized gaseous distention more proximally.  No acute osseous abnormality.    CT Head No Cont (11.09.18 @ 16:01)  IMPRESSION:  No acute intracranial pathology is noted. Left frontal lobe   encephalomalacia is again visualized. If symptoms persist, consider short   interval follow-up head CT or brain MRI, if there are no MRI   contraindications. Xray Abdomen 1 View PORTABLE -Urgent (11.11.18 @ 16:50)  IMPRESSION:  Redemonstrated distended air-filled loops of small and large bowel   including a prominently distended sigmoid measuring, similar to prior.   This may represent ileus or sigmoid volvulus, likely the former in view   of the generalized gaseous distention more proximally.  No acute osseous abnormality.    CT Head No Cont (11.09.18 @ 16:01)  IMPRESSION:  No acute intracranial pathology is noted. Left frontal lobe   encephalomalacia is again visualized. If symptoms persist, consider short   interval follow-up head CT or brain MRI, if there are no MRI   contraindications.    Xray Knee 3 Views, Right (11.14.18 @ 17:45)  IMPRESSION:  No fracture, dislocation, or joint effusion.  Preserved joint spaces with smooth and intact articular surfaces. No   joint margin erosions or intra-articular or periarticular calcifications.  Unremarkable quadriceps and patellar tendon shadows.   Slightly osteopenic appearing osseous structures for patient age, may   correlate with underlying neuromuscular disease or limited mobility   considering history of TBI.

## 2018-11-13 NOTE — DISCHARGE NOTE ADULT - INSTRUCTIONS
Low sodium and low cholesterol diet with ensure enlive 3 times per day and ensure pudding 3 times per day

## 2018-11-13 NOTE — DISCHARGE NOTE ADULT - PLAN OF CARE
Prevention of recurrence You were admitted to the hospital after falling at home. This may have been due to dizziness as a side effect of your new medication Vimpat, so your dose of Vimpat was decreased. A CT scan of your head showed no acute problems. Physical therapy evaluated you and recommended ... If you fall again please seek urgent medical attention. Please follow up with your primary care physician within 1-2 weeks of discharge to discuss your recent hospitalization. Prevention of further seizure activity, medication management You have a history of seizures for which you take Vimpat and Topiramate. The hospital neurology team decided to continue your home medication Topiramate at 200mg twice daily, and decided to decrease your new medication Vimpat (Lacosamide) down to 100mg in the morning and 150mg at bedtime. It is essential that you see a neurologist to manage your anti-epileptic medications in the future. You may follow up with neurologist Dr. Walsh who was seeing you in the hospital; please make an appointment with Dr. Walsh within 2 weeks of discharge. Resolution of symptoms When you were admitted to the hospital your father stated that you had been constipated, so you were started on a regimen of stool softeners and laxatives to move your bowels. An X-ray of your abdomen showed that your bowel was dilated, indicating that you had been constipated. If you experience recurrence of your constipation please seek medical attention and please follow up with your primary care doctor within two weeks of discharge. You were admitted to the hospital after falling at home. This may have been due to dizziness as a side effect of your new medication Vimpat, so your dose of Vimpat was decreased. A CT scan of your head showed no acute problems. Physical therapy evaluated you and recommended that you go to a sub-acute rehabilitation facility to work on strength training, conditioning, and balance. If you fall again please seek urgent medical attention. Please follow up with your primary care physician within 1-2 weeks of discharge to discuss your recent hospitalization. Alleviation of pain While in the hospital you complained of pain in your right knee. An Xray of the knee was done which showed that there While in the hospital you complained of pain in your right knee. An Xray of the knee was done which showed that there is no acute fracture or dislocation. You were started on tylenol three times a day as well as a topical lidocaine patch which has helped your pain, so please continue to take these medications. Please seek medical attention if the pain worsens.

## 2018-11-13 NOTE — DISCHARGE NOTE ADULT - ADDITIONAL INSTRUCTIONS
Please follow up with your primary care physician within 1-2 weeks of discharge.   It is absolutely essential that you begin to see a neurologist to manage your anti-epileptic medications. Please follow up with neurologist Dr. Walsh within 2 weeks of discharge.

## 2018-11-14 DIAGNOSIS — M25.561 PAIN IN RIGHT KNEE: ICD-10-CM

## 2018-11-14 PROCEDURE — 73562 X-RAY EXAM OF KNEE 3: CPT | Mod: 26,RT

## 2018-11-14 PROCEDURE — 99232 SBSQ HOSP IP/OBS MODERATE 35: CPT

## 2018-11-14 RX ORDER — LIDOCAINE 4 G/100G
1 CREAM TOPICAL DAILY
Qty: 0 | Refills: 0 | Status: DISCONTINUED | OUTPATIENT
Start: 2018-11-14 | End: 2018-11-15

## 2018-11-14 RX ORDER — ACETAMINOPHEN 500 MG
975 TABLET ORAL EVERY 8 HOURS
Qty: 0 | Refills: 0 | Status: DISCONTINUED | OUTPATIENT
Start: 2018-11-14 | End: 2018-11-15

## 2018-11-14 RX ADMIN — LIDOCAINE 1 PATCH: 4 CREAM TOPICAL at 14:05

## 2018-11-14 RX ADMIN — LACOSAMIDE 150 MILLIGRAM(S): 50 TABLET ORAL at 22:07

## 2018-11-14 RX ADMIN — SENNA PLUS 2 TABLET(S): 8.6 TABLET ORAL at 22:08

## 2018-11-14 RX ADMIN — PANTOPRAZOLE SODIUM 40 MILLIGRAM(S): 20 TABLET, DELAYED RELEASE ORAL at 06:32

## 2018-11-14 RX ADMIN — POLYETHYLENE GLYCOL 3350 17 GRAM(S): 17 POWDER, FOR SOLUTION ORAL at 11:49

## 2018-11-14 RX ADMIN — LACOSAMIDE 100 MILLIGRAM(S): 50 TABLET ORAL at 06:32

## 2018-11-14 RX ADMIN — Medication 100 MILLIGRAM(S): at 11:49

## 2018-11-14 RX ADMIN — LIDOCAINE 1 PATCH: 4 CREAM TOPICAL at 21:18

## 2018-11-14 RX ADMIN — ATORVASTATIN CALCIUM 20 MILLIGRAM(S): 80 TABLET, FILM COATED ORAL at 22:08

## 2018-11-14 RX ADMIN — Medication 975 MILLIGRAM(S): at 23:00

## 2018-11-14 RX ADMIN — Medication 975 MILLIGRAM(S): at 14:05

## 2018-11-14 RX ADMIN — Medication 5 MILLIGRAM(S): at 22:08

## 2018-11-14 RX ADMIN — Medication 975 MILLIGRAM(S): at 15:04

## 2018-11-14 RX ADMIN — Medication 200 MILLIGRAM(S): at 01:45

## 2018-11-14 RX ADMIN — ARIPIPRAZOLE 5 MILLIGRAM(S): 15 TABLET ORAL at 11:49

## 2018-11-14 RX ADMIN — Medication 10 MILLIGRAM(S): at 17:59

## 2018-11-14 RX ADMIN — Medication 10 MILLIGRAM(S): at 06:32

## 2018-11-14 RX ADMIN — Medication 975 MILLIGRAM(S): at 22:07

## 2018-11-14 RX ADMIN — Medication 200 MILLIGRAM(S): at 14:05

## 2018-11-14 NOTE — PROGRESS NOTE ADULT - PROBLEM SELECTOR PLAN 3
Unclear if seizure occurred prior to this admission, conflicting information from father.   - neurology following:  Recs appreciated. Recommend reduce Vimpat to 100 mg in the AM and 150 PM, continue topiramate 200 mg BID, outpt follow-up with Dr. Walsh from epilepsy/neurology at [address] [place] [address] ([phone]) Unclear if seizure occurred prior to this admission, conflicting information from father.   - neurology following:  Recs appreciated. Recommend reduce Vimpat to 100 mg in the AM and 150 PM, continue topiramate 200 mg BID, outpt follow-up with Dr. Walsh from epilepsy/neurology at [address] [place] [address] ([phone])  - no seizure episodes while inpatient

## 2018-11-14 NOTE — PROGRESS NOTE ADULT - PROBLEM SELECTOR PLAN 2
CT head negative for trauma, no signs of trauma, no obvious signs of fractures or MSK pain. Unclear etiology given lack of clinical history. Previous record with fall at home from seizure activities (similar to this admission). Unclear how much pt was walking previously as he has contractures of the upper extremity. However, family reports that he was ambulating weeks ago and patient states that he walks with a cane.  - Continue to monitor while inpatient.  - Fall risk ordered.   - CTH negative (unchanged from previous). If worsening neuro status, will repeat imaging.  - PT consulted and rec rehab placement CT head negative for trauma, no signs of trauma, no obvious signs of fractures or MSK pain. Unclear etiology given lack of clinical history. Previous record with fall at home from seizure activities (similar to this admission). Unclear how much pt was walking previously as he has contractures of the upper extremity. However, family reports that he was ambulating weeks ago and patient states that he walks with a cane.  - Continue to monitor while inpatient.  - Fall risk ordered.   - CTH negative (unchanged from previous). If worsening neuro status, will repeat imaging.  - PT consulted and rec rehab placement  - PCP contacted and states pt can walk at baseline but has poor balance

## 2018-11-14 NOTE — CHART NOTE - NSCHARTNOTEFT_GEN_A_CORE
Spoke with patient's mother Ms. Jacqueline Bryan and discussed patient's discharge plan with her. She expressed that it is her and her 's wishes for the patient to be discharged to a rehabilitation facility. She expresses concern that patient has been having increasing difficulty with ambulation at home for the past few months prior to admission to the hospital and had multiple falls in the past. Her  and she has increasing difficulty to carry him and take care of him. She wants him to participate in rehab program to increase his strength and improve ambulation. I informed Ms. Jacqueline Bryan that patient has already been accepted to [facility] and we will inform her when he is ready to be discharged to the facility. She expresses understanding and is agreeable to the discharge plan. She can be reached at [phone]. Will inform the SW tomorrow regarding discharge plan.     Savita Washington PGY-2  Internal medicine [facility]  Pager [phone] Spoke with patient's mother Ms. Jacqueline Bryan and discussed patient's discharge plan with her. She expressed that it is her and her 's wishes for the patient to be discharged to a rehabilitation facility. She expresses concern that patient has been having increasing difficulty with ambulation at home for the past few months prior to admission to the hospital and had multiple falls in the past. Her  and she has increasing difficulty to carry him and take care of him. She wants him to participate in rehab program to increase his strength and improve ambulation. I informed Ms. Jacqueline Bryan that patient has already been accepted to [facility] and we will inform her when he is ready to be discharged to the facility. She expresses understanding and is agreeable to the discharge plan. She can be reached at [phone], but prefers us to contact her daughter Letty at [phone]. Will inform the SW tomorrow regarding discharge plan.     Savita Washington PGY-2  Internal medicine [facility]  Pager [phone]

## 2018-11-14 NOTE — PROGRESS NOTE ADULT - SUBJECTIVE AND OBJECTIVE BOX
Dr. Lidia Rutledge, PGY1  Pager [phone]    TASIA WARE  37y  MRN: [id number]    Subjective:  Patient is a 37y old  Male who presents with a chief complaint of dizziness (13 Nov 2018 17:04)      Overnight:   Patient complained of ongoing right knee pain despite tylenol PRN, was ordered ketorolac but did not utilize PRN. This morning patient endorses some pain in the right knee. Patient resting comfortably in bed, minimally responding to questions      MEDICATIONS  (STANDING):  ARIPiprazole 5 milliGRAM(s) Oral daily  atorvastatin 20 milliGRAM(s) Oral at bedtime  baclofen 10 milliGRAM(s) Oral two times a day  bisacodyl 5 milliGRAM(s) Oral at bedtime  docusate sodium 100 milliGRAM(s) Oral daily  ketorolac   Injectable 15 milliGRAM(s) IV Push once  lacosamide 150 milliGRAM(s) Oral <User Schedule>  lacosamide 100 milliGRAM(s) Oral <User Schedule>  pantoprazole    Tablet 40 milliGRAM(s) Oral before breakfast  polyethylene glycol 3350 17 Gram(s) Oral daily  senna 2 Tablet(s) Oral at bedtime  topiramate 200 milliGRAM(s) Oral every 12 hours    MEDICATIONS  (PRN):  acetaminophen   Tablet .. 650 milliGRAM(s) Oral every 6 hours PRN Mild Pain (1 - 3), Moderate Pain (4 - 6)        Objective:  Vitals: Vital Signs Last 24 Hrs  T(C): 37.2 (11-14-18 @ 06:30), Max: 37.2 (11-14-18 @ 06:30)  T(F): 99 (11-14-18 @ 06:30), Max: 99 (11-14-18 @ 06:30)  HR: 67 (11-14-18 @ 06:30) (67 - 81)  BP: 112/76 (11-14-18 @ 06:30) (101/67 - 112/76)  BP(mean): --  RR: 14 (11-14-18 @ 06:30) (14 - 18)  SpO2: 100% (11-14-18 @ 06:30) (97% - 100%)                  I&O's Summary    13 Nov 2018 07:01  -  14 Nov 2018 07:00  --------------------------------------------------------  IN: 0 mL / OUT: 400 mL / NET: -400 mL      PHYSICAL EXAM:  GENERAL: middle aged male appearing much older than stated age, laying comfortably in bed. Poorly groomed. No acute distress   HEAD:  Atraumatic, Normocephalic  EYES: EOMI, PERRLA, conjunctiva and sclera clear  ENMT: No tonsillar erythema, exudates, or enlargement; Moist mucous membranes, fair dentition, No lesions  NECK: Supple, No JVD, Normal thyroid, full ROM  CHEST/LUNG: Clear to auscultation bilaterally; No rales, rhonchi, wheezing, or rubs  HEART: Regular rate and rhythm; No murmurs, rubs, or gallops  ABDOMEN: Soft, Nontender, Nondistended; Bowel sounds present  EXTREMITIES:  2+ Peripheral Pulses, No clubbing, cyanosis, or edema  LYMPH: No lymphadenopathy noted  SKIN: No rashes or lesions  NERVOUS SYSTEM:  Alert. A&Ox2, pain with passive extension of right knee                                            LABS:  11-13    140  |  107  |  22  ----------------------------<  114<H>  3.5   |  21<L>  |  0.77    Ca    9.3      13 Nov 2018 05:34  Phos  3.8     11-13  Mg     2.1     11-13        RADIOLOGY & ADDITIONAL TESTS:  no new imaging      Consultants involved in case:   Physical therapy recommending dispo to subacute rehab

## 2018-11-14 NOTE — CHART NOTE - NSCHARTNOTEFT_GEN_A_CORE
Attempted all 5 available phone numbers in the chart for patient's parents using  service with [employer] translators #[phone] and #[phone]. Left 2x voicemail messages with 2x parent phone numbers (the other 3 phone numbers go straight to voicemail or are disconnected), with instructions to call back at [facility] phone number and long-range spectra number. Patient's sister available on her cell and says she will attempt to contact parents and have them return our calls.     Phone numbers that work best are as follows:   Letty (sister) [phone]  Parents home [phone]  Father's cell [phone]

## 2018-11-14 NOTE — PROGRESS NOTE ADULT - PROBLEM SELECTOR PLAN 4
- patient now complaining about diarrhea, but no diarrhea noted per nursing  - repeat AXR unchanged from previous, reported likely ileus  - c/w bowel regimen, pt with one bowel movement yesterday

## 2018-11-15 VITALS
DIASTOLIC BLOOD PRESSURE: 63 MMHG | OXYGEN SATURATION: 98 % | RESPIRATION RATE: 14 BRPM | TEMPERATURE: 98 F | HEART RATE: 76 BPM | SYSTOLIC BLOOD PRESSURE: 93 MMHG

## 2018-11-15 PROCEDURE — 99239 HOSP IP/OBS DSCHRG MGMT >30: CPT

## 2018-11-15 RX ORDER — LACOSAMIDE 50 MG/1
1 TABLET ORAL
Qty: 0 | Refills: 0 | COMMUNITY
Start: 2018-11-15

## 2018-11-15 RX ORDER — POLYETHYLENE GLYCOL 3350 17 G/17G
17 POWDER, FOR SOLUTION ORAL
Qty: 0 | Refills: 0 | COMMUNITY
Start: 2018-11-15

## 2018-11-15 RX ORDER — ACETAMINOPHEN 500 MG
3 TABLET ORAL
Qty: 0 | Refills: 0 | COMMUNITY
Start: 2018-11-15

## 2018-11-15 RX ORDER — SENNA PLUS 8.6 MG/1
2 TABLET ORAL
Qty: 0 | Refills: 0 | COMMUNITY
Start: 2018-11-15

## 2018-11-15 RX ORDER — DOCUSATE SODIUM 100 MG
1 CAPSULE ORAL
Qty: 0 | Refills: 0 | COMMUNITY
Start: 2018-11-15

## 2018-11-15 RX ADMIN — LACOSAMIDE 100 MILLIGRAM(S): 50 TABLET ORAL at 06:52

## 2018-11-15 RX ADMIN — LIDOCAINE 1 PATCH: 4 CREAM TOPICAL at 11:18

## 2018-11-15 RX ADMIN — Medication 100 MILLIGRAM(S): at 11:17

## 2018-11-15 RX ADMIN — POLYETHYLENE GLYCOL 3350 17 GRAM(S): 17 POWDER, FOR SOLUTION ORAL at 11:17

## 2018-11-15 RX ADMIN — Medication 975 MILLIGRAM(S): at 06:52

## 2018-11-15 RX ADMIN — ARIPIPRAZOLE 5 MILLIGRAM(S): 15 TABLET ORAL at 11:17

## 2018-11-15 RX ADMIN — Medication 200 MILLIGRAM(S): at 00:21

## 2018-11-15 RX ADMIN — Medication 10 MILLIGRAM(S): at 06:52

## 2018-11-15 RX ADMIN — PANTOPRAZOLE SODIUM 40 MILLIGRAM(S): 20 TABLET, DELAYED RELEASE ORAL at 06:53

## 2018-11-15 RX ADMIN — Medication 975 MILLIGRAM(S): at 07:45

## 2018-11-15 NOTE — PROGRESS NOTE ADULT - ASSESSMENT
36 y/o male, total care dependent, with h/o TBI at the age of 4, epilepsy at the age of 13, multiple hospitalizations 2/2 seizures presenting with reported dizziness and fall.
36 y/o male, total care dependent, with h/o TBI at the age of 4, epilepsy at the age of 13, multiple hospitalizations 2/2 seizures presenting with reported dizziness and fall.
36 y/o male, total care dependent, with h/o TBI at the age of 4, epilepsy at the age of 13, multiple hospitalizations 2/2 seizures presenting with reported dizziness and fall. Patient reports diarrhea but none observed by nursing, clinically constipated. Now with right knee pain s/p Xray R knee with no acute fracture/dislocation, awaiting rehab placement and family on board.
38 y/o male, total care dependent, with h/o TBI at the age of 4, epilepsy at the age of 13, multiple hospitalizations 2/2 seizures presenting with reported dizziness and fall.
38 y/o male, total care dependent, with h/o TBI at the age of 4, epilepsy at the age of 13, multiple hospitalizations 2/2 seizures presenting with reported dizziness and fall. Patient reports diarrhea but none observed by nursing, clinically constipated
36 y/o male, total care dependent, with h/o TBI at the age of 4, epilepsy at the age of 13, multiple hospitalizations 2/2 seizures presenting with reported dizziness and fall. Patient reports diarrhea but none observed by nursing, clinically constipated. Now with right knee pain, awaiting rehab placement and family input

## 2018-11-15 NOTE — PROGRESS NOTE ADULT - PROBLEM SELECTOR PLAN 4
- patient complaining about diarrhea, but no diarrhea noted per nursing  - repeat AXR unchanged from previous, reported likely ileus  - c/w bowel regimen, pt with one bowel movement 11/13

## 2018-11-15 NOTE — PROGRESS NOTE ADULT - PROBLEM SELECTOR PLAN 6
Patient complaining of right knee pain at rest, worsened with passive extension of the knee, but knee nontender/no swelling/no erythema on exam   - low suspicion for infectious etiology   - tylenol 650 PO PRN ordered for pain  - start Ketorolac 15mg IV PRN
IMPROVE score: 0  DVT ppx: SCDs   Diet: regular halal  PT consulted
IMPROVE score: 1  DVT ppx: SCDs   Diet: regular halal  PT consulted
Patient complaining of right knee pain at rest, worsened with passive extension of the knee, but knee nontender/no swelling/no erythema on exam   - low suspicion for infectious etiology   - c/w tylenol 975 PO TID and topical lidocaine patch for pain  - XRay of knee without acute fracture or dislocation

## 2018-11-15 NOTE — PROGRESS NOTE ADULT - PROBLEM SELECTOR PLAN 1
Initially unclear hx as patient does not always speak even when communicating in his own language. However, at bedside patient reported having dizziness with standing. Possible recent change to ?vimpat playing a role.   - neurology following and recommended Vimpat 100mg AM and 150mg PM   - monitor neuro status

## 2018-11-15 NOTE — PROGRESS NOTE ADULT - PROBLEM SELECTOR PROBLEM 6
Knee pain, right
Knee pain, right
Need for prophylactic measure

## 2018-11-15 NOTE — PROGRESS NOTE ADULT - PROBLEM SELECTOR PLAN 3
Unclear if seizure occurred prior to this admission, conflicting information from father.   - neurology following:  Recs appreciated. Recommend reduce Vimpat to 100 mg in the AM and 150 PM, continue topiramate 200 mg BID, outpt follow-up with Dr. Walsh from epilepsy/neurology at [address] [place] [address] ([phone])  - no seizure episodes while inpatient

## 2018-11-15 NOTE — PROGRESS NOTE ADULT - PROBLEM SELECTOR PLAN 5
c/w atorvastatin 20mg OD

## 2018-11-15 NOTE — PROGRESS NOTE ADULT - SUBJECTIVE AND OBJECTIVE BOX
Dr. Lidia Rutledge, PGY1  Pager [phone]    TASIA WARE  37y  MRN: [id number]    Subjective:  Patient is a 37y old  Male who presents with a chief complaint of dizziness (14 Nov 2018 09:23)      Overnight:   No acute events overnight. This morning patient minimally talkative but states that his knee feels "okay" today. Per nursing patient had no bowel movements overnight.       MEDICATIONS  (STANDING):  acetaminophen   Tablet .. 975 milliGRAM(s) Oral every 8 hours  ARIPiprazole 5 milliGRAM(s) Oral daily  atorvastatin 20 milliGRAM(s) Oral at bedtime  baclofen 10 milliGRAM(s) Oral two times a day  bisacodyl 5 milliGRAM(s) Oral at bedtime  docusate sodium 100 milliGRAM(s) Oral daily  lacosamide 150 milliGRAM(s) Oral <User Schedule>  lacosamide 100 milliGRAM(s) Oral <User Schedule>  lidocaine   Patch 1 Patch Transdermal daily  pantoprazole    Tablet 40 milliGRAM(s) Oral before breakfast  polyethylene glycol 3350 17 Gram(s) Oral daily  senna 2 Tablet(s) Oral at bedtime  topiramate 200 milliGRAM(s) Oral every 12 hours    MEDICATIONS  (PRN):        Objective:  Vitals: Vital Signs Last 24 Hrs  T(C): 36.8 (11-15-18 @ 06:49), Max: 37 (11-14-18 @ 21:19)  T(F): 98.2 (11-15-18 @ 06:49), Max: 98.6 (11-14-18 @ 21:19)  HR: 76 (11-15-18 @ 06:49) (76 - 89)  BP: 93/63 (11-15-18 @ 06:49) (93/63 - 102/74)  BP(mean): --  RR: 14 (11-15-18 @ 06:49) (14 - 17)  SpO2: 98% (11-15-18 @ 06:49) (98% - 99%)                    PHYSICAL EXAM:  GENERAL: middle aged male appearing much older than stated age, laying comfortably in bed. Poorly groomed. No acute distress   HEAD:  Atraumatic, Normocephalic  EYES: EOMI, PERRLA, conjunctiva and sclera clear  ENMT: No tonsillar erythema, exudates, or enlargement; Moist mucous membranes, fair dentition, No lesions  NECK: Supple, No JVD, Normal thyroid, full ROM  CHEST/LUNG: Clear to auscultation bilaterally; No rales, rhonchi, wheezing, or rubs  HEART: Regular rate and rhythm; No murmurs, rubs, or gallops  ABDOMEN: Soft, Nontender, Nondistended; Bowel sounds present  EXTREMITIES:  2+ Peripheral Pulses, No clubbing, cyanosis, or edema  LYMPH: No lymphadenopathy noted  SKIN: No rashes or lesions  NERVOUS SYSTEM:  Alert. A&Ox2, pain with passive extension of right knee                                               LABS:  No AM labs today    11-13    140  |  107  |  22  ----------------------------<  114<H>  3.5   |  21<L>  |  0.77    Ca    9.3      13 Nov 2018 05:34          RADIOLOGY & ADDITIONAL TESTS:  Xray Knee 3 Views, Right (11.14.18 @ 17:45)  IMPRESSION:  No fracture, dislocation, or joint effusion.  Preserved joint spaces with smooth and intact articular surfaces. No   joint margin erosions or intra-articular or periarticular calcifications.  Unremarkable quadriceps and patellar tendon shadows.   Slightly osteopenic appearing osseous structures for patient age, may   correlate with underlying neuromuscular disease or limited mobility   considering history of TBI.      Consultants involved in case:   Physical therapy recommending dispo to subacute rehab, pt family in agreement

## 2018-11-15 NOTE — PROGRESS NOTE ADULT - PROBLEM SELECTOR PLAN 7
IMPROVE score: 1  DVT ppx: SCDs   Diet: regular halal  PT recommending dispo to [facility]
IMPROVE score: 1  DVT ppx: SCDs   Diet: regular halal  PT recommending dispo to [facility]

## 2018-11-15 NOTE — PROGRESS NOTE ADULT - ATTENDING COMMENTS
42 min discharge time
History not as clear. However, per family prior to last admission patient was able to ambulate w/ cane and is minimally verbal.     Pt evaluated w/ resident Dr. Stratton who speaks the same language. Patient answers some questions on one word answers. On exam he has upper extremity contractures. When prompted he attempts to lift his LE off the bed but can not.     CTH reviewed and shows encephalomalacia of the  Left frontal lobe. Otherwise no acute pathology.     -C/w current antiepileptic regimen. Will continue to appreciate neuro recs   -Monitor neuro status  -PT daneal
-C/w current antiepileptic regimen. Will continue to appreciate neuro recs   -Monitor neuro status  -obtain orthostatics if possible  -Pt initially constipated and started on bowel regimen. S/p abdominal  Xray yesterday personally reviewed and showed distended loops of small and large bowel throughout the abdomen with stool. Team spoke w/ radiology today who reports sigmoid colon is significantly distended, w/ Differential diagnosis include ileus and sigmoid volvulus. Patient however having frequent loose stools on BM. Would repeat Abd xray. Hold stool softeners for now.   -Dispo- PT rec rehab. Will need to d/w family.
32 min discharge time

## 2018-11-15 NOTE — PROGRESS NOTE ADULT - PROBLEM SELECTOR PROBLEM 3
Cerebral Seizure

## 2018-11-15 NOTE — PROGRESS NOTE ADULT - PROBLEM SELECTOR PLAN 2
CT head negative for trauma, no signs of trauma, no obvious signs of fractures or MSK pain. Unclear etiology given lack of clinical history. Previous record with fall at home from seizure activities (similar to this admission). Unclear how much pt was walking previously as he has contractures of the upper extremity. However, family reports that he was ambulating weeks ago and patient states that he walks with a cane.  - Continue to monitor while inpatient.  - Fall risk ordered.   - CTH negative (unchanged from previous). If worsening neuro status, will repeat imaging.  - PT consulted and rec rehab placement, family on board  - PCP contacted and states pt can walk at baseline but has poor balance

## 2018-11-21 ENCOUNTER — INPATIENT (INPATIENT)
Facility: HOSPITAL | Age: 38
LOS: 6 days | Discharge: HOME HEALTH | End: 2018-11-28
Attending: HOSPITALIST | Admitting: HOSPITALIST
Payer: MEDICAID

## 2018-11-21 VITALS
RESPIRATION RATE: 18 BRPM | OXYGEN SATURATION: 98 % | DIASTOLIC BLOOD PRESSURE: 61 MMHG | SYSTOLIC BLOOD PRESSURE: 94 MMHG | HEART RATE: 90 BPM | TEMPERATURE: 100 F

## 2018-11-21 DIAGNOSIS — K52.9 NONINFECTIVE GASTROENTERITIS AND COLITIS, UNSPECIFIED: ICD-10-CM

## 2018-11-21 LAB
ALBUMIN SERPL ELPH-MCNC: 2.7 G/DL — LOW (ref 3.3–5)
ALP SERPL-CCNC: 132 U/L — HIGH (ref 40–120)
ALT FLD-CCNC: 68 U/L — HIGH (ref 4–41)
ANISOCYTOSIS BLD QL: SLIGHT — SIGNIFICANT CHANGE UP
APPEARANCE UR: CLEAR — SIGNIFICANT CHANGE UP
AST SERPL-CCNC: 113 U/L — HIGH (ref 4–40)
BACTERIA # UR AUTO: NEGATIVE — SIGNIFICANT CHANGE UP
BASE EXCESS BLDV CALC-SCNC: -3.6 MMOL/L — SIGNIFICANT CHANGE UP
BASOPHILS # BLD AUTO: 0.02 K/UL — SIGNIFICANT CHANGE UP (ref 0–0.2)
BASOPHILS NFR BLD AUTO: 0.2 % — SIGNIFICANT CHANGE UP (ref 0–2)
BASOPHILS NFR SPEC: 0 % — SIGNIFICANT CHANGE UP (ref 0–2)
BILIRUB SERPL-MCNC: 0.7 MG/DL — SIGNIFICANT CHANGE UP (ref 0.2–1.2)
BILIRUB UR-MCNC: NEGATIVE — SIGNIFICANT CHANGE UP
BLASTS # FLD: 0 % — SIGNIFICANT CHANGE UP (ref 0–0)
BLOOD GAS VENOUS - CREATININE: SIGNIFICANT CHANGE UP MG/DL (ref 0.5–1.3)
BLOOD UR QL VISUAL: HIGH
BUN SERPL-MCNC: 12 MG/DL — SIGNIFICANT CHANGE UP (ref 7–23)
C DIFF TOX GENS STL QL NAA+PROBE: SIGNIFICANT CHANGE UP
CALCIUM SERPL-MCNC: 7.9 MG/DL — LOW (ref 8.4–10.5)
CHLORIDE BLDV-SCNC: 108 MMOL/L — SIGNIFICANT CHANGE UP (ref 96–108)
CHLORIDE SERPL-SCNC: 107 MMOL/L — SIGNIFICANT CHANGE UP (ref 98–107)
CO2 SERPL-SCNC: 15 MMOL/L — LOW (ref 22–31)
COLOR SPEC: SIGNIFICANT CHANGE UP
CREAT SERPL-MCNC: 0.66 MG/DL — SIGNIFICANT CHANGE UP (ref 0.5–1.3)
EOSINOPHIL # BLD AUTO: 0.1 K/UL — SIGNIFICANT CHANGE UP (ref 0–0.5)
EOSINOPHIL NFR BLD AUTO: 1.2 % — SIGNIFICANT CHANGE UP (ref 0–6)
EOSINOPHIL NFR FLD: 2.6 % — SIGNIFICANT CHANGE UP (ref 0–6)
GAS PNL BLDV: 137 MMOL/L — SIGNIFICANT CHANGE UP (ref 136–146)
GIANT PLATELETS BLD QL SMEAR: PRESENT — SIGNIFICANT CHANGE UP
GLUCOSE BLDV-MCNC: 98 — SIGNIFICANT CHANGE UP (ref 70–99)
GLUCOSE SERPL-MCNC: 96 MG/DL — SIGNIFICANT CHANGE UP (ref 70–99)
GLUCOSE UR-MCNC: NEGATIVE — SIGNIFICANT CHANGE UP
HCO3 BLDV-SCNC: 20 MMOL/L — SIGNIFICANT CHANGE UP (ref 20–27)
HCT VFR BLD CALC: 28.5 % — LOW (ref 39–50)
HCT VFR BLDV CALC: 34.9 % — LOW (ref 39–51)
HGB BLD-MCNC: 9.7 G/DL — LOW (ref 13–17)
HGB BLDV-MCNC: 11.3 G/DL — LOW (ref 13–17)
HYALINE CASTS # UR AUTO: SIGNIFICANT CHANGE UP
HYPOCHROMIA BLD QL: SLIGHT — SIGNIFICANT CHANGE UP
IMM GRANULOCYTES # BLD AUTO: 0.03 # — SIGNIFICANT CHANGE UP
IMM GRANULOCYTES NFR BLD AUTO: 0.4 % — SIGNIFICANT CHANGE UP (ref 0–1.5)
KETONES UR-MCNC: NEGATIVE — SIGNIFICANT CHANGE UP
LACTATE BLDV-MCNC: 1.9 MMOL/L — SIGNIFICANT CHANGE UP (ref 0.5–2)
LEUKOCYTE ESTERASE UR-ACNC: SIGNIFICANT CHANGE UP
LYMPHOCYTES # BLD AUTO: 1.33 K/UL — SIGNIFICANT CHANGE UP (ref 1–3.3)
LYMPHOCYTES # BLD AUTO: 16.4 % — SIGNIFICANT CHANGE UP (ref 13–44)
LYMPHOCYTES NFR SPEC AUTO: 4.3 % — LOW (ref 13–44)
MCHC RBC-ENTMCNC: 31 PG — SIGNIFICANT CHANGE UP (ref 27–34)
MCHC RBC-ENTMCNC: 34 % — SIGNIFICANT CHANGE UP (ref 32–36)
MCV RBC AUTO: 91.1 FL — SIGNIFICANT CHANGE UP (ref 80–100)
METAMYELOCYTES # FLD: 0 % — SIGNIFICANT CHANGE UP (ref 0–1)
MONOCYTES # BLD AUTO: 0.18 K/UL — SIGNIFICANT CHANGE UP (ref 0–0.9)
MONOCYTES NFR BLD AUTO: 2.2 % — SIGNIFICANT CHANGE UP (ref 2–14)
MONOCYTES NFR BLD: 0 % — LOW (ref 2–9)
MYELOCYTES NFR BLD: 0 % — SIGNIFICANT CHANGE UP (ref 0–0)
NEUTROPHIL AB SER-ACNC: 78.6 % — HIGH (ref 43–77)
NEUTROPHILS # BLD AUTO: 6.44 K/UL — SIGNIFICANT CHANGE UP (ref 1.8–7.4)
NEUTROPHILS NFR BLD AUTO: 79.6 % — HIGH (ref 43–77)
NEUTS BAND # BLD: 9.4 % — HIGH (ref 0–6)
NITRITE UR-MCNC: NEGATIVE — SIGNIFICANT CHANGE UP
NRBC # FLD: 0 — SIGNIFICANT CHANGE UP
OTHER - HEMATOLOGY %: 0.9 — SIGNIFICANT CHANGE UP
OVALOCYTES BLD QL SMEAR: SLIGHT — SIGNIFICANT CHANGE UP
PCO2 BLDV: 40 MMHG — LOW (ref 41–51)
PH BLDV: 7.35 PH — SIGNIFICANT CHANGE UP (ref 7.32–7.43)
PH UR: 7 — SIGNIFICANT CHANGE UP (ref 5–8)
PLATELET # BLD AUTO: 125 K/UL — LOW (ref 150–400)
PLATELET COUNT - ESTIMATE: SIGNIFICANT CHANGE UP
PMV BLD: 12.8 FL — SIGNIFICANT CHANGE UP (ref 7–13)
PO2 BLDV: 25 MMHG — LOW (ref 35–40)
POTASSIUM BLDV-SCNC: 3.2 MMOL/L — LOW (ref 3.4–4.5)
POTASSIUM SERPL-MCNC: 3.2 MMOL/L — LOW (ref 3.5–5.3)
POTASSIUM SERPL-SCNC: 3.2 MMOL/L — LOW (ref 3.5–5.3)
PROMYELOCYTES # FLD: 0 % — SIGNIFICANT CHANGE UP (ref 0–0)
PROT SERPL-MCNC: 6.6 G/DL — SIGNIFICANT CHANGE UP (ref 6–8.3)
PROT UR-MCNC: 50 — SIGNIFICANT CHANGE UP
RBC # BLD: 3.13 M/UL — LOW (ref 4.2–5.8)
RBC # FLD: 11.9 % — SIGNIFICANT CHANGE UP (ref 10.3–14.5)
RBC CASTS # UR COMP ASSIST: HIGH (ref 0–?)
SAO2 % BLDV: 42.9 % — LOW (ref 60–85)
SODIUM SERPL-SCNC: 134 MMOL/L — LOW (ref 135–145)
SP GR SPEC: 1.01 — SIGNIFICANT CHANGE UP (ref 1–1.04)
SQUAMOUS # UR AUTO: SIGNIFICANT CHANGE UP
UROBILINOGEN FLD QL: HIGH
VARIANT LYMPHS # BLD: 3.4 % — SIGNIFICANT CHANGE UP
WBC # BLD: 8.1 K/UL — SIGNIFICANT CHANGE UP (ref 3.8–10.5)
WBC # FLD AUTO: 8.1 K/UL — SIGNIFICANT CHANGE UP (ref 3.8–10.5)
WBC UR QL: HIGH (ref 0–?)

## 2018-11-21 PROCEDURE — 71045 X-RAY EXAM CHEST 1 VIEW: CPT | Mod: 26

## 2018-11-21 PROCEDURE — 99232 SBSQ HOSP IP/OBS MODERATE 35: CPT | Mod: GC

## 2018-11-21 PROCEDURE — 76705 ECHO EXAM OF ABDOMEN: CPT | Mod: 26

## 2018-11-21 PROCEDURE — 74177 CT ABD & PELVIS W/CONTRAST: CPT | Mod: 26

## 2018-11-21 RX ORDER — CIPROFLOXACIN LACTATE 400MG/40ML
400 VIAL (ML) INTRAVENOUS ONCE
Qty: 0 | Refills: 0 | Status: COMPLETED | OUTPATIENT
Start: 2018-11-21 | End: 2018-11-21

## 2018-11-21 RX ORDER — SODIUM CHLORIDE 9 MG/ML
1000 INJECTION, SOLUTION INTRAVENOUS ONCE
Qty: 0 | Refills: 0 | Status: COMPLETED | OUTPATIENT
Start: 2018-11-21 | End: 2018-11-21

## 2018-11-21 RX ORDER — ACETAMINOPHEN 500 MG
650 TABLET ORAL ONCE
Qty: 0 | Refills: 0 | Status: COMPLETED | OUTPATIENT
Start: 2018-11-21 | End: 2018-11-21

## 2018-11-21 RX ORDER — METRONIDAZOLE 500 MG
500 TABLET ORAL ONCE
Qty: 0 | Refills: 0 | Status: COMPLETED | OUTPATIENT
Start: 2018-11-21 | End: 2018-11-21

## 2018-11-21 RX ORDER — SODIUM CHLORIDE 9 MG/ML
1000 INJECTION INTRAMUSCULAR; INTRAVENOUS; SUBCUTANEOUS ONCE
Qty: 0 | Refills: 0 | Status: COMPLETED | OUTPATIENT
Start: 2018-11-21 | End: 2018-11-21

## 2018-11-21 RX ORDER — LACOSAMIDE 50 MG/1
150 TABLET ORAL ONCE
Qty: 0 | Refills: 0 | Status: DISCONTINUED | OUTPATIENT
Start: 2018-11-21 | End: 2018-11-22

## 2018-11-21 RX ADMIN — Medication 200 MILLIGRAM(S): at 22:13

## 2018-11-21 RX ADMIN — SODIUM CHLORIDE 1000 MILLILITER(S): 9 INJECTION INTRAMUSCULAR; INTRAVENOUS; SUBCUTANEOUS at 22:13

## 2018-11-21 RX ADMIN — SODIUM CHLORIDE 1000 MILLILITER(S): 9 INJECTION, SOLUTION INTRAVENOUS at 16:35

## 2018-11-21 RX ADMIN — Medication 650 MILLIGRAM(S): at 16:35

## 2018-11-21 RX ADMIN — Medication 100 MILLIGRAM(S): at 23:34

## 2018-11-21 RX ADMIN — Medication 650 MILLIGRAM(S): at 19:13

## 2018-11-21 NOTE — CONSULT NOTE ADULT - ATTENDING COMMENTS
I saw and examined the patient and agree with the above note.    Rule out acute cholecystitis:  -Planning for laparoscopic cholecystectomy at this time would be presumptuous.   -Would rec HIDA for confirmation fo cholecystitis  -No need for ductal imaging currently given normal T.bili  -Abx, resuscitation and labs per primary team  -NPO/IVF  -Pain control via IV meds  Proctocolitis  -IV abx should cover possible colitis  -eventual sigmoid/colonoscopy    I spent 45min reviewing data, images and documentation as well as in care coordination.    Theodore Agarwal MD  Acute and Critical Cre Surgery    Cell: [phone]  Email: [email or web address]

## 2018-11-21 NOTE — ED PROVIDER NOTE - PHYSICAL EXAMINATION
Gen: w  Head: NCAT  ENT: Airway patent, moist mucous membranes, nasal passageways clear, no pharyngeal erythema or exudates, uvula midline, no cervical lymphadenopathy  Cardiac: Normal rate, normal rhythm, no murmurs/rubs/gallops appreciated  Respiratory: Lungs CTA B/L  Gastrointestinal: +BS, Abdomen soft, nontender, nondistended, no rebound, no guarding, no organomegaly   MSK: No gross abnormalities, contracted UE, no edema, Left upper arm picc   HEME: Extremities warm, pulses intact and symmetrical in all four extremities  Skin: No rashes, no lesions  Neuro: No gross neurologic deficits Gen: chronicall ill appearing,   Head: NCAT  ENT: Airway patent, moist mucous membranes, nasal passageways clear, no pharyngeal erythema or exudates, uvula midline, no cervical lymphadenopathy  Cardiac: Normal rate, normal rhythm, no murmurs/rubs/gallops appreciated  Respiratory: Lungs CTA B/L  Gastrointestinal: +BS, Abdomen soft, TTP suprapubic area, nondistended, no rebound, no guarding  MSK: No gross abnormalities, contracted UE, no edema, Left upper arm picc   HEME: Extremities warm, pulses intact and symmetrical in all four extremities  Skin: No rashes, no lesions  Neuro: No gross neurologic deficits

## 2018-11-21 NOTE — CONSULT NOTE ADULT - ASSESSMENT
37y male with proctocolitis. Etiology is unclear, but given patient's multiple prior episodes of constipation, a lengthy period of procto-sigmoidal stasis (with recent clearance of stool burden) may have resulted in a stercoral colitis. Low suspicion for acute cholecystitis. Suspect that the elevation of hepatic enzymes is secondary to overall septic clinical picture. Recommend:  1) IV antibiotic coverage for enteric pathogens. Colitis can result in the translocation of colonic sindi.  2) IVF resuscitation.   3) Urine culture.  4) Trend abdominal examination and LFTs. If RUQ tenderness becomes clearly evident, is worsening, or if LFTs are up-trending, recommend HIDA Scan to definitively rule out cholecystitis.      Will follow      Discussed with Attending (Stefano)      Beni Marrero  R4, General Surgery

## 2018-11-21 NOTE — ED ADULT NURSE NOTE - NSIMPLEMENTINTERV_GEN_ALL_ED
Implemented All Fall Risk Interventions:  [place] to call system. Call bell, personal items and telephone within reach. Instruct patient to call for assistance. Room bathroom lighting operational. Non-slip footwear when patient is off stretcher. Physically safe environment: no spills, clutter or unnecessary equipment. Stretcher in lowest position, wheels locked, appropriate side rails in place. Provide visual cue, wrist band, yellow gown, etc. Monitor gait and stability. Monitor for mental status changes and reorient to person, place, and time. Review medications for side effects contributing to fall risk. Reinforce activity limits and safety measures with patient and family.

## 2018-11-21 NOTE — ED ADULT TRIAGE NOTE - CHIEF COMPLAINT QUOTE
Pt brought in by EMS from NH for fever, hypotension and weakness. As per EMS pts BP was 89/54 prior to ED arrival. Pt noted to have PICC line in L arm. Pt complaining of abdominal pain.

## 2018-11-21 NOTE — ED PROVIDER NOTE - NS ED ROS FT
CONST: +fevers  EYES: no pain  ENT: no sore throat, no cough  CV: no chest pain  RESP: no shortness of breath  ABD: +abdominal pain, no nausea, no vomiting  : no dysuria, increased frequency, or hematuria  MSK: no back pain  NEURO: no headache or additional neurologic complaints  HEME: no easy bleeding  SKIN:  no rash

## 2018-11-21 NOTE — ED ADULT NURSE NOTE - OBJECTIVE STATEMENT
Pt received in spot 25, minimally verbal but answering yes/no questions, from [facility].  Sent in from center for "fever and hypotension."  Pt 99.9F rectally, BP normotensive upon arrival to room.  Pt endorsing suprapubic pain, denies any nausea/vomiting.  Denies any urinary symptoms, but noted to have pink tinged urine in diaper and at tip of penis.  Attempt at straight cath for urine sample, met resistance with 14Fr crudet, and noted to have bright red blood with minimal drainage, immediately removed.  MD Joseph made aware.  Pt R arm noted to be contracted, and L PICC line in place.  Skin intact.  Will continue to monitor.

## 2018-11-21 NOTE — ED PROVIDER NOTE - OBJECTIVE STATEMENT
36 y/o male, total care dependent, with h/o TBI at the age of 4, epilepsy at the age of 13, multiple hospitalizations 2/2 seizures, most recent admission a week ago for dizziness, sent to ED from NH for hypotension bp 89/54, suprapubic pain and tmax 101.9. Patient is a poor historian but does endorse blood at the penis. Per NH, pt had abd pain over the weekend and a javier was placed with reported blood clot at that time, unknown time of removal of the javier. 36 y/o male, total care dependent, with h/o TBI at the age of 4, epilepsy at the age of 13, multiple hospitalizations 2/2 seizures, most recent admission a week ago for dizziness, sent to ED from NH for hypotension bp 89/54, suprapubic pain and tmax 101.9. Patient is a poor historian but does endorse blood at the penis. Per NH, pt had abd pain over the weekend and a javier was placed with reported blood clot at that time, unknown time of removal of the javier. Per meds reviewed, pt has been on levaquin 500 daily     Dr Dalton Villeda

## 2018-11-21 NOTE — ED PROVIDER NOTE - PROGRESS NOTE DETAILS
Jake PGY2: Patient had 320cc of urine output on javier, started on levaquin Jake PGY2: Patient had 320cc of urine output on javier, pt reportedly had brief javier placed 11/18 for abd pain and retention, removed same day. started on levaquin yesterday 500mg q24h, has already received this morning's dose Jake PGY2: Patient had 320cc of urine output on javier, pt reportedly had brief javier placed 11/18 for abd pain and retention, removed same day. started on levaquin yesterday 500mg q24h, has already received this morning's dose, will hold off on abx until imaging results given source of fever likely uti, no hx of resistant utis per chart review, bp stable and normotensive [place] ODOM: Surgery recommends trending patient's LFTs; if continued RUQ pain or increasing LFTs consider HIDA.  Cipro and Flagyl given for proctocolitis seen on CT scan.

## 2018-11-21 NOTE — CONSULT NOTE ADULT - SUBJECTIVE AND OBJECTIVE BOX
GENERAL SURGERY CONSULT    Consulting surgical team: NICOLE (pager [phone])  Consulting attending: Stefano      HPI:  Patient is a 37y Male - h/o TBI at age 4 and subsequent epilepsy, functionally bedbound, with limited use of LUE alone - presenting with complaint of suprapubic and LLQ pain, and hypotension (SBP in the 80s) associated with fever (T max 101.9 F) at his RN home. These symptoms appear to have been first noted earlier today. Patient is primarily Hebrew-speaking, but does understand English. When asked if he has pain and where it is located, the patient responds there is "pain here" and gestures to his suprapubic area.  Of note, ED placed a Pandya catheter, with some difficulty (multiple attempts).      PAST MEDICAL HISTORY:  Arthritis  High cholesterol  Head injury  Cerebral Seizure      PAST SURGICAL HISTORY:  No significant past surgical history  No significant past surgical history      ALLERGIES:  penicillin (Unknown)      MEDICATIONS  (STANDING):  lacosamide 150 milliGRAM(s) Oral once  metroNIDAZOLE  IVPB 500 milliGRAM(s) IV Intermittent Once    MEDICATIONS  (PRN):      VITALS & I/Os:  Vital Signs Last 24 Hrs  T(C): 37.6 (2018 22:36), Max: 37.7 (2018 14:53)  T(F): 99.6 (2018 22:36), Max: 99.9 (2018 14:53)  HR: 80 (2018 22:36) (73 - 90)  BP: 138/78 (2018 22:36) (94/61 - 150/65)  BP(mean): --  RR: 22 (2018 22:36) (18 - 22)  SpO2: 100% (2018 22:36) (98% - 100%)  CAPILLARY BLOOD GLUCOSE          I&O's Summary        GEN: NAD, alert and responsive, cachectic.  HEENT: WNL  CHEST: Symmetrical chest rise, no increased work of breathing  HEART: RRR, non-muffled heart sounds  ABD: Soft, slightly distended. Tenderness elicited with deep palpation in all quadrants, however strongest reaction was to suprapubic/LLQ palpation. The next most tender region was his RUQ. No Huynh sign. No rebound tenderness or guarding.  RECTAL: Soft brown stool present in patient's diaper. No hemorrhoids, no emily-rectal tenderness or areas of fluctuance. No stool in the rectal vault, no masses present. Rectal wall and prostate were tender to palpation. No blood or stool on glove.  : Pandya in place, blood at the meatus.  EXT/VASC: RUE contracted and non-functional, b/l LEs partially contracted and essentially non-functional. L hand contracted, but motor/sensory in LUE is grossly intact.         LABS:                        9.7    8.10  )-----------( 125      ( 2018 17:51 )             28.5         134<L>  |  107  |  12  ----------------------------<  96  3.2<L>   |  15<L>  |  0.66    Ca    7.9<L>      2018 15:55    TPro  6.6  /  Alb  2.7<L>  /  TBili  0.7  /  DBili  x   /  AST  113<H>  /  ALT  68<H>  /  AlkPhos  132<H>      Lactate: lacosamide 150 milliGRAM(s) Oral once  metroNIDAZOLE  IVPB 500 milliGRAM(s) IV Intermittent Once              @ 17:51  1.9    17:51 - VBG - pH: 7.35  | pCO2: 40    | pO2: 25    | Lactate: 1.9      Urinalysis Basic - ( 2018 17:40 )    Color: LIGHT ORANGE / Appearance: CLEAR / S.009 / pH: 7.0  Gluc: NEGATIVE / Ketone: NEGATIVE  / Bili: NEGATIVE / Urobili: SMALL   Blood: LARGE / Protein: 50 / Nitrite: NEGATIVE   Leuk Esterase: MODERATE / RBC: 26-50 / WBC 6-10   Sq Epi: FEW / Non Sq Epi: x / Bacteria: NEGATIVE        IMAGING:  < from: US Abdomen Limited (18 @ 20:19) >  IMPRESSION:     Gallbladder is mildly distended however there is no wall thickening.   There is trace pericholecystic fluid and perihepatic fluid. There is   gallbladder sludge. No stones are seen. There was no sonographic Huynh   sign. Findings are equivocal for acute cholecystitis. Correlate   clinically.    < end of copied text >    < from: CT Abdomen and Pelvis w/ IV Cont (18 @ 19:19) >  IMPRESSION: Wall thickening of the rectum and sigmoid colon with trace   presacral fluid, suggestive of proctocolitis. Correlate clinically.    < end of copied text >

## 2018-11-21 NOTE — ED PROVIDER NOTE - MEDICAL DECISION MAKING DETAILS
37M vitals meeting sirs criteria initially, with resolution after a liter of fluids, source likely urinary, already received abx, will obtain labs/ ct a/p given nonspecific abd pain and reassess broadening abx coverage Guillaume: 37M vitals meeting sirs criteria initially, with resolution after a liter of fluids, source likely urinary, already received abx, will obtain labs/ ct a/p given nonspecific abd pain and reassess broadening abx coverage

## 2018-11-21 NOTE — CHART NOTE - NSCHARTNOTEFT_GEN_A_CORE
Note    Called to place Difficult Javier    Pt requires javier for retention / sepsis    T(C): 37.7 (11-21-18 @ 15:52), Max: 37.7 (11-21-18 @ 14:53)  HR: 73 (11-21-18 @ 15:52) (73 - 90)  BP: 132/65 (11-21-18 @ 15:52) (94/61 - 132/65)  RR: 20 (11-21-18 @ 15:52) (18 - 20)  SpO2: 100% (11-21-18 @ 15:52) (98% - 100%)  Wt(kg): --    PE:  GEN:-NAD,  ABD: soft  :Penis uncircumcised: (+) blood at meatus Scrotum WNL    Procedure:  14f  javier placed in aseptic fashion.  325cc of urine collected yellow color  pt tolerated well      Assessment/Plan  Continue javier as per primary team   strict I & O's  TOV as per primary team

## 2018-11-22 DIAGNOSIS — Z29.9 ENCOUNTER FOR PROPHYLACTIC MEASURES, UNSPECIFIED: ICD-10-CM

## 2018-11-22 DIAGNOSIS — K52.9 NONINFECTIVE GASTROENTERITIS AND COLITIS, UNSPECIFIED: ICD-10-CM

## 2018-11-22 DIAGNOSIS — N30.90 CYSTITIS, UNSPECIFIED WITHOUT HEMATURIA: ICD-10-CM

## 2018-11-22 DIAGNOSIS — R56.9 UNSPECIFIED CONVULSIONS: ICD-10-CM

## 2018-11-22 DIAGNOSIS — A41.9 SEPSIS, UNSPECIFIED ORGANISM: ICD-10-CM

## 2018-11-22 LAB
ALBUMIN SERPL ELPH-MCNC: 2.6 G/DL — LOW (ref 3.3–5)
ALP SERPL-CCNC: 134 U/L — HIGH (ref 40–120)
ALT FLD-CCNC: 80 U/L — HIGH (ref 4–41)
AST SERPL-CCNC: 114 U/L — HIGH (ref 4–40)
BASOPHILS # BLD AUTO: 0.01 K/UL — SIGNIFICANT CHANGE UP (ref 0–0.2)
BASOPHILS NFR BLD AUTO: 0.2 % — SIGNIFICANT CHANGE UP (ref 0–2)
BILIRUB SERPL-MCNC: 0.5 MG/DL — SIGNIFICANT CHANGE UP (ref 0.2–1.2)
BUN SERPL-MCNC: 8 MG/DL — SIGNIFICANT CHANGE UP (ref 7–23)
CALCIUM SERPL-MCNC: 7.9 MG/DL — LOW (ref 8.4–10.5)
CHLORIDE SERPL-SCNC: 110 MMOL/L — HIGH (ref 98–107)
CO2 SERPL-SCNC: 19 MMOL/L — LOW (ref 22–31)
CREAT SERPL-MCNC: 0.66 MG/DL — SIGNIFICANT CHANGE UP (ref 0.5–1.3)
EOSINOPHIL # BLD AUTO: 0.06 K/UL — SIGNIFICANT CHANGE UP (ref 0–0.5)
EOSINOPHIL NFR BLD AUTO: 1.1 % — SIGNIFICANT CHANGE UP (ref 0–6)
GLUCOSE SERPL-MCNC: 94 MG/DL — SIGNIFICANT CHANGE UP (ref 70–99)
HCT VFR BLD CALC: 30.3 % — LOW (ref 39–50)
HGB BLD-MCNC: 10.4 G/DL — LOW (ref 13–17)
IMM GRANULOCYTES # BLD AUTO: 0.02 # — SIGNIFICANT CHANGE UP
IMM GRANULOCYTES NFR BLD AUTO: 0.4 % — SIGNIFICANT CHANGE UP (ref 0–1.5)
LYMPHOCYTES # BLD AUTO: 0.74 K/UL — LOW (ref 1–3.3)
LYMPHOCYTES # BLD AUTO: 13.4 % — SIGNIFICANT CHANGE UP (ref 13–44)
MAGNESIUM SERPL-MCNC: 1.7 MG/DL — SIGNIFICANT CHANGE UP (ref 1.6–2.6)
MCHC RBC-ENTMCNC: 31.5 PG — SIGNIFICANT CHANGE UP (ref 27–34)
MCHC RBC-ENTMCNC: 34.3 % — SIGNIFICANT CHANGE UP (ref 32–36)
MCV RBC AUTO: 91.8 FL — SIGNIFICANT CHANGE UP (ref 80–100)
MONOCYTES # BLD AUTO: 0.15 K/UL — SIGNIFICANT CHANGE UP (ref 0–0.9)
MONOCYTES NFR BLD AUTO: 2.7 % — SIGNIFICANT CHANGE UP (ref 2–14)
NEUTROPHILS # BLD AUTO: 4.56 K/UL — SIGNIFICANT CHANGE UP (ref 1.8–7.4)
NEUTROPHILS NFR BLD AUTO: 82.2 % — HIGH (ref 43–77)
NRBC # FLD: 0 — SIGNIFICANT CHANGE UP
PHOSPHATE SERPL-MCNC: 2 MG/DL — LOW (ref 2.5–4.5)
PLATELET # BLD AUTO: 101 K/UL — LOW (ref 150–400)
PMV BLD: 12.6 FL — SIGNIFICANT CHANGE UP (ref 7–13)
POTASSIUM SERPL-MCNC: 2.8 MMOL/L — CRITICAL LOW (ref 3.5–5.3)
POTASSIUM SERPL-SCNC: 2.8 MMOL/L — CRITICAL LOW (ref 3.5–5.3)
PROT SERPL-MCNC: 6 G/DL — SIGNIFICANT CHANGE UP (ref 6–8.3)
RBC # BLD: 3.3 M/UL — LOW (ref 4.2–5.8)
RBC # FLD: 11.7 % — SIGNIFICANT CHANGE UP (ref 10.3–14.5)
SODIUM SERPL-SCNC: 139 MMOL/L — SIGNIFICANT CHANGE UP (ref 135–145)
SPECIMEN SOURCE: SIGNIFICANT CHANGE UP
WBC # BLD: 5.54 K/UL — SIGNIFICANT CHANGE UP (ref 3.8–10.5)
WBC # FLD AUTO: 5.54 K/UL — SIGNIFICANT CHANGE UP (ref 3.8–10.5)

## 2018-11-22 PROCEDURE — 99223 1ST HOSP IP/OBS HIGH 75: CPT

## 2018-11-22 PROCEDURE — 12345: CPT | Mod: NC,GC

## 2018-11-22 RX ORDER — TOPIRAMATE 25 MG
200 TABLET ORAL EVERY 12 HOURS
Qty: 0 | Refills: 0 | Status: DISCONTINUED | OUTPATIENT
Start: 2018-11-22 | End: 2018-11-28

## 2018-11-22 RX ORDER — CIPROFLOXACIN LACTATE 400MG/40ML
400 VIAL (ML) INTRAVENOUS EVERY 12 HOURS
Qty: 0 | Refills: 0 | Status: DISCONTINUED | OUTPATIENT
Start: 2018-11-22 | End: 2018-11-28

## 2018-11-22 RX ORDER — METRONIDAZOLE 500 MG
500 TABLET ORAL EVERY 8 HOURS
Qty: 0 | Refills: 0 | Status: DISCONTINUED | OUTPATIENT
Start: 2018-11-22 | End: 2018-11-28

## 2018-11-22 RX ORDER — SODIUM,POTASSIUM PHOSPHATES 278-250MG
1 POWDER IN PACKET (EA) ORAL
Qty: 0 | Refills: 0 | Status: DISCONTINUED | OUTPATIENT
Start: 2018-11-22 | End: 2018-11-22

## 2018-11-22 RX ORDER — ACETAMINOPHEN 500 MG
650 TABLET ORAL EVERY 6 HOURS
Qty: 0 | Refills: 0 | Status: DISCONTINUED | OUTPATIENT
Start: 2018-11-22 | End: 2018-11-28

## 2018-11-22 RX ORDER — SENNA PLUS 8.6 MG/1
2 TABLET ORAL AT BEDTIME
Qty: 0 | Refills: 0 | Status: DISCONTINUED | OUTPATIENT
Start: 2018-11-22 | End: 2018-11-28

## 2018-11-22 RX ORDER — LACOSAMIDE 50 MG/1
100 TABLET ORAL
Qty: 0 | Refills: 0 | Status: DISCONTINUED | OUTPATIENT
Start: 2018-11-22 | End: 2018-11-26

## 2018-11-22 RX ORDER — POTASSIUM CHLORIDE 20 MEQ
10 PACKET (EA) ORAL
Qty: 0 | Refills: 0 | Status: COMPLETED | OUTPATIENT
Start: 2018-11-22 | End: 2018-11-22

## 2018-11-22 RX ORDER — PANTOPRAZOLE SODIUM 20 MG/1
40 TABLET, DELAYED RELEASE ORAL
Qty: 0 | Refills: 0 | Status: DISCONTINUED | OUTPATIENT
Start: 2018-11-22 | End: 2018-11-28

## 2018-11-22 RX ORDER — SODIUM CHLORIDE 9 MG/ML
1000 INJECTION INTRAMUSCULAR; INTRAVENOUS; SUBCUTANEOUS
Qty: 0 | Refills: 0 | Status: DISCONTINUED | OUTPATIENT
Start: 2018-11-22 | End: 2018-11-27

## 2018-11-22 RX ORDER — DOCUSATE SODIUM 100 MG
100 CAPSULE ORAL DAILY
Qty: 0 | Refills: 0 | Status: DISCONTINUED | OUTPATIENT
Start: 2018-11-22 | End: 2018-11-28

## 2018-11-22 RX ORDER — ARIPIPRAZOLE 15 MG/1
5 TABLET ORAL DAILY
Qty: 0 | Refills: 0 | Status: DISCONTINUED | OUTPATIENT
Start: 2018-11-22 | End: 2018-11-28

## 2018-11-22 RX ORDER — BACLOFEN 100 %
10 POWDER (GRAM) MISCELLANEOUS
Qty: 0 | Refills: 0 | Status: DISCONTINUED | OUTPATIENT
Start: 2018-11-22 | End: 2018-11-28

## 2018-11-22 RX ORDER — POTASSIUM CHLORIDE 20 MEQ
40 PACKET (EA) ORAL ONCE
Qty: 0 | Refills: 0 | Status: COMPLETED | OUTPATIENT
Start: 2018-11-22 | End: 2018-11-22

## 2018-11-22 RX ORDER — LIDOCAINE 4 G/100G
1 CREAM TOPICAL
Qty: 0 | Refills: 0 | COMMUNITY

## 2018-11-22 RX ORDER — POTASSIUM CHLORIDE 20 MEQ
1 PACKET (EA) ORAL
Qty: 0 | Refills: 0 | COMMUNITY

## 2018-11-22 RX ORDER — ATORVASTATIN CALCIUM 80 MG/1
20 TABLET, FILM COATED ORAL AT BEDTIME
Qty: 0 | Refills: 0 | Status: DISCONTINUED | OUTPATIENT
Start: 2018-11-22 | End: 2018-11-28

## 2018-11-22 RX ORDER — POLYETHYLENE GLYCOL 3350 17 G/17G
17 POWDER, FOR SOLUTION ORAL DAILY
Qty: 0 | Refills: 0 | Status: DISCONTINUED | OUTPATIENT
Start: 2018-11-22 | End: 2018-11-28

## 2018-11-22 RX ORDER — LACOSAMIDE 50 MG/1
150 TABLET ORAL
Qty: 0 | Refills: 0 | Status: DISCONTINUED | OUTPATIENT
Start: 2018-11-22 | End: 2018-11-26

## 2018-11-22 RX ORDER — CHLORHEXIDINE GLUCONATE 213 G/1000ML
1 SOLUTION TOPICAL
Qty: 0 | Refills: 0 | Status: DISCONTINUED | OUTPATIENT
Start: 2018-11-22 | End: 2018-11-28

## 2018-11-22 RX ORDER — SODIUM,POTASSIUM PHOSPHATES 278-250MG
1 POWDER IN PACKET (EA) ORAL
Qty: 0 | Refills: 0 | Status: COMPLETED | OUTPATIENT
Start: 2018-11-22 | End: 2018-11-22

## 2018-11-22 RX ADMIN — Medication 1 TABLET(S): at 18:42

## 2018-11-22 RX ADMIN — Medication 200 MILLIGRAM(S): at 06:05

## 2018-11-22 RX ADMIN — SODIUM CHLORIDE 100 MILLILITER(S): 9 INJECTION INTRAMUSCULAR; INTRAVENOUS; SUBCUTANEOUS at 06:05

## 2018-11-22 RX ADMIN — Medication 100 MILLIEQUIVALENT(S): at 10:39

## 2018-11-22 RX ADMIN — ATORVASTATIN CALCIUM 20 MILLIGRAM(S): 80 TABLET, FILM COATED ORAL at 22:36

## 2018-11-22 RX ADMIN — Medication 100 MILLIEQUIVALENT(S): at 14:28

## 2018-11-22 RX ADMIN — SODIUM CHLORIDE 100 MILLILITER(S): 9 INJECTION INTRAMUSCULAR; INTRAVENOUS; SUBCUTANEOUS at 03:00

## 2018-11-22 RX ADMIN — Medication 10 MILLIGRAM(S): at 18:42

## 2018-11-22 RX ADMIN — Medication 40 MILLIEQUIVALENT(S): at 10:41

## 2018-11-22 RX ADMIN — ARIPIPRAZOLE 5 MILLIGRAM(S): 15 TABLET ORAL at 12:42

## 2018-11-22 RX ADMIN — Medication 100 MILLIGRAM(S): at 14:34

## 2018-11-22 RX ADMIN — LACOSAMIDE 100 MILLIGRAM(S): 50 TABLET ORAL at 10:45

## 2018-11-22 RX ADMIN — Medication 200 MILLIGRAM(S): at 22:40

## 2018-11-22 RX ADMIN — CHLORHEXIDINE GLUCONATE 1 APPLICATION(S): 213 SOLUTION TOPICAL at 18:42

## 2018-11-22 RX ADMIN — SODIUM CHLORIDE 100 MILLILITER(S): 9 INJECTION INTRAMUSCULAR; INTRAVENOUS; SUBCUTANEOUS at 18:42

## 2018-11-22 RX ADMIN — LACOSAMIDE 150 MILLIGRAM(S): 50 TABLET ORAL at 01:35

## 2018-11-22 RX ADMIN — Medication 100 MILLIGRAM(S): at 22:40

## 2018-11-22 RX ADMIN — Medication 200 MILLIGRAM(S): at 22:36

## 2018-11-22 RX ADMIN — SODIUM CHLORIDE 100 MILLILITER(S): 9 INJECTION INTRAMUSCULAR; INTRAVENOUS; SUBCUTANEOUS at 22:36

## 2018-11-22 RX ADMIN — Medication 1 TABLET(S): at 10:40

## 2018-11-22 RX ADMIN — Medication 100 MILLIGRAM(S): at 06:08

## 2018-11-22 RX ADMIN — SENNA PLUS 2 TABLET(S): 8.6 TABLET ORAL at 22:37

## 2018-11-22 RX ADMIN — PANTOPRAZOLE SODIUM 40 MILLIGRAM(S): 20 TABLET, DELAYED RELEASE ORAL at 06:05

## 2018-11-22 RX ADMIN — CHLORHEXIDINE GLUCONATE 1 APPLICATION(S): 213 SOLUTION TOPICAL at 06:05

## 2018-11-22 RX ADMIN — SODIUM CHLORIDE 100 MILLILITER(S): 9 INJECTION INTRAMUSCULAR; INTRAVENOUS; SUBCUTANEOUS at 10:45

## 2018-11-22 RX ADMIN — Medication 100 MILLIGRAM(S): at 12:44

## 2018-11-22 RX ADMIN — Medication 1 TABLET(S): at 12:44

## 2018-11-22 RX ADMIN — Medication 1 TABLET(S): at 22:40

## 2018-11-22 RX ADMIN — Medication 100 MILLIEQUIVALENT(S): at 12:41

## 2018-11-22 RX ADMIN — POLYETHYLENE GLYCOL 3350 17 GRAM(S): 17 POWDER, FOR SOLUTION ORAL at 12:44

## 2018-11-22 RX ADMIN — LACOSAMIDE 150 MILLIGRAM(S): 50 TABLET ORAL at 22:36

## 2018-11-22 RX ADMIN — Medication 10 MILLIGRAM(S): at 06:05

## 2018-11-22 RX ADMIN — Medication 200 MILLIGRAM(S): at 10:45

## 2018-11-22 NOTE — H&P ADULT - NSHPPHYSICALEXAM_GEN_ALL_CORE
T(C): 37.1 (11-22-18 @ 01:50), Max: 37.7 (11-21-18 @ 14:53)  HR: 80 (11-22-18 @ 01:50) (73 - 90)  BP: 100/67 (11-22-18 @ 01:50) (94/61 - 150/65)  RR: 17 (11-22-18 @ 01:50) (17 - 22)  SpO2: 100% (11-22-18 @ 01:50) (98% - 100%)    Constitutional: NAD, well-developed, well-nourished  Ears, Nose, Mouth, and Throat: normal external ears and nose, normal hearing, dry oral mucosa  Eyes: normal conjunctiva, EOMI, PERRL  Neck: supple, no JVD  Respiratory: Clear to auscultation bilaterally. No wheezes, rales or rhonchi. Normal respiratory effort  Cardiovascular: RRR, no M/R/G, no edema, 2+ Peripheral Pulses  Gastrointestinal: soft, +suprapubic TTP, no rigidity, guarding, rebound, +BS  Skin: warm, dry, no rash  Neurologic: sensation grossly intact, CN grossly intact, +RUE contracture and weakness (doesn't follow commands)  Musculoskeletal: no clubbing, no cyanosis, no joint swelling  Psychiatric: AOX2, no agitation, anxiety

## 2018-11-22 NOTE — PROGRESS NOTE ADULT - SUBJECTIVE AND OBJECTIVE BOX
Dr. Lidia Rutledge, PGY1  Pager [phone]    TASIA WARE  37y  MRN: [id number]    Subjective:  Patient is a 37y old  Male who presents with a chief complaint of fever (2018 02:33)      Overnight:   No acute events overnight. In ED yesterday pt given 2x 1L IVF boluses, javier placed, started IVF 100cc/hr, gen surg consult. This morning patient appears to be in pain, endorses tenderness in RUQ. Minimally responsive to questions but alert.       MEDICATIONS  (STANDING):  ARIPiprazole 5 milliGRAM(s) Oral daily  atorvastatin 20 milliGRAM(s) Oral at bedtime  baclofen 10 milliGRAM(s) Oral two times a day  chlorhexidine 4% Liquid 1 Application(s) Topical two times a day  ciprofloxacin   IVPB 400 milliGRAM(s) IV Intermittent every 12 hours  docusate sodium 100 milliGRAM(s) Oral daily  lacosamide 100 milliGRAM(s) Oral <User Schedule>  lacosamide 150 milliGRAM(s) Oral <User Schedule>  metroNIDAZOLE  IVPB 500 milliGRAM(s) IV Intermittent every 8 hours  pantoprazole    Tablet 40 milliGRAM(s) Oral before breakfast  polyethylene glycol 3350 17 Gram(s) Oral daily  potassium acid phosphate/sodium acid phosphate tablet (K-PHOS No. 2) 1 Tablet(s) Oral four times a day with meals  potassium chloride   Powder 40 milliEquivalent(s) Oral once  potassium chloride  10 mEq/100 mL IVPB 10 milliEquivalent(s) IV Intermittent every 1 hour  senna 2 Tablet(s) Oral at bedtime  sodium chloride 0.9%. 1000 milliLiter(s) (100 mL/Hr) IV Continuous <Continuous>  topiramate 200 milliGRAM(s) Oral every 12 hours    MEDICATIONS  (PRN):        Objective:  Vitals: Vital Signs Last 24 Hrs  T(C): 36.9 (18 @ 05:59), Max: 37.7 (18 @ 14:53)  T(F): 98.5 (18 @ 05:59), Max: 99.9 (18 @ 14:53)  HR: 70 (18 @ 05:59) (70 - 90)  BP: 99/66 (18 @ 05:59) (94/61 - 150/65)  BP(mean): --  RR: 17 (18 @ 05:59) (17 - 22)  SpO2: 100% (18 @ 05:59) (98% - 100%)                    I&O's Summary    2018 07:01  -  2018 07:00  --------------------------------------------------------  IN: 0 mL / OUT: 900 mL / NET: -900 mL    PHYSICAL EXAM:  	Constitutional: appears to be in pain (grimacing slightly), well-developed, poorly nourished  	Ears, Nose, Mouth, and Throat: normal external ears and nose, normal hearing, dry oral mucosa  	Eyes: normal conjunctiva, EOMI, PERRL  	Neck: supple, no JVD  	Respiratory: Clear to auscultation bilaterally. No wheezes, rales or rhonchi. Normal respiratory effort  	Cardiovascular: RRR, no M/R/G, no edema, 2+ Peripheral Pulses  	Gastrointestinal: soft, +suprapubic TTP, no rigidity, guarding, rebound, +BS  	Skin: warm, dry, no rash  	Neurologic: sensation grossly intact, CN grossly intact, +RUE contracture and weakness (doesn't follow commands)  	Musculoskeletal: no clubbing, no cyanosis, no joint swelling  Psychiatric: A&O x1-2, no agitation, anxiety                                            LABS:      139  |  110<H>  |  8   ----------------------------<  94  2.8<LL>   |  19<L>  |  0.66      134<L>  |  107  |  12  ----------------------------<  96  3.2<L>   |  15<L>  |  0.66    Ca    7.9<L>      2018 05:10  Ca    7.9<L>      2018 15:55  Phos  2.0       Mg     1.7         TPro  6.0  /  Alb  2.6<L>  /  TBili  0.5  /  DBili  x   /  AST  114<H>  /  ALT  80<H>  /  AlkPhos  134<H>    TPro  6.6  /  Alb  2.7<L>  /  TBili  0.7  /  DBili  x   /  AST  113<H>  /  ALT  68<H>  /  AlkPhos  132<H>                    Urinalysis Basic - ( 2018 17:40 )  Color: LIGHT ORANGE / Appearance: CLEAR / S.009 / pH: 7.0  Gluc: NEGATIVE / Ketone: NEGATIVE  / Bili: NEGATIVE / Urobili: SMALL   Blood: LARGE / Protein: 50 / Nitrite: NEGATIVE   Leuk Esterase: MODERATE / RBC: 26-50 / WBC 6-10   Sq Epi: FEW / Non Sq Epi: x / Bacteria: NEGATIVE                            10.4   5.54  )-----------( 101      ( 2018 05:10 )             30.3                         9.7    8.10  )-----------( 125      ( 2018 17:51 )             28.5           RADIOLOGY & ADDITIONAL TESTS:  US Abdomen Limited (18 @ 20:19)  IMPRESSION:   Gallbladder is mildly distended however there is no wall thickening.   There is trace pericholecystic fluid and perihepatic fluid. There is   gallbladder sludge. No stones are seen. There was no sonographic Huynh   sign. Findings are equivocal for acute cholecystitis. Correlate   clinically.    CT Abdomen and Pelvis w/ IV Cont (18 @ 19:19)  IMPRESSION: Wall thickening of the rectum and sigmoid colon with trace   presacral fluid, suggestive of proctocolitis. Correlate clinically.    Xray Chest 1 View AP/PA (18 @ 16:28)  IMPRESSION:  Underinflated but otherwise clear lungs. No pleural effusions or   pneumothorax.  Cardiac and mediastinal silhouettes grossly within normal limits.  Trachea midline.  Gracile osteopenic osseous structures suggesting underlying neuromuscular   disease.    Imaging Personally Reviewed:  [x ] YES  [ ] NO      Consultants involved in case:   GENERAL SURGERY:   Recommend:  1) IV antibiotic coverage for enteric pathogens. Colitis can result in the translocation of colonic sindi.  2) IVF resuscitation.   3) Urine culture.  4) Trend abdominal examination and LFTs. If RUQ tenderness becomes clearly evident, is worsening, or if LFTs are up-trending, recommend HIDA Scan to definitively rule out cholecystitis.  Will follow    Consultant(s) Notes Reviewed:  [X ] YES  [ ] NO:   Care Discussed with Consultants/Other Providers [ ] YES  [ ] Dr. Lidia Rutledge, PGY1  Pager [phone]    TASIA WARE  37y  MRN: [id number]    Subjective:  Patient is a 37y old  Male who presents with a chief complaint of fever (2018 02:33)      Overnight:   No acute events overnight. In ED yesterday pt given 2x 1L IVF boluses, javier placed, started IVF 100cc/hr, gen surg consult. This morning patient appears to be in pain, endorses tenderness in RUQ. Minimally responsive to questions but alert.       MEDICATIONS  (STANDING):  ARIPiprazole 5 milliGRAM(s) Oral daily  atorvastatin 20 milliGRAM(s) Oral at bedtime  baclofen 10 milliGRAM(s) Oral two times a day  chlorhexidine 4% Liquid 1 Application(s) Topical two times a day  ciprofloxacin   IVPB 400 milliGRAM(s) IV Intermittent every 12 hours  docusate sodium 100 milliGRAM(s) Oral daily  lacosamide 100 milliGRAM(s) Oral <User Schedule>  lacosamide 150 milliGRAM(s) Oral <User Schedule>  metroNIDAZOLE  IVPB 500 milliGRAM(s) IV Intermittent every 8 hours  pantoprazole    Tablet 40 milliGRAM(s) Oral before breakfast  polyethylene glycol 3350 17 Gram(s) Oral daily  potassium acid phosphate/sodium acid phosphate tablet (K-PHOS No. 2) 1 Tablet(s) Oral four times a day with meals  potassium chloride   Powder 40 milliEquivalent(s) Oral once  potassium chloride  10 mEq/100 mL IVPB 10 milliEquivalent(s) IV Intermittent every 1 hour  senna 2 Tablet(s) Oral at bedtime  sodium chloride 0.9%. 1000 milliLiter(s) (100 mL/Hr) IV Continuous <Continuous>  topiramate 200 milliGRAM(s) Oral every 12 hours    MEDICATIONS  (PRN):        Objective:  Vitals: Vital Signs Last 24 Hrs  T(C): 36.9 (18 @ 05:59), Max: 37.7 (18 @ 14:53)  T(F): 98.5 (18 @ 05:59), Max: 99.9 (18 @ 14:53)  HR: 70 (18 @ 05:59) (70 - 90)  BP: 99/66 (18 @ 05:59) (94/61 - 150/65)  BP(mean): --  RR: 17 (18 @ 05:59) (17 - 22)  SpO2: 100% (18 @ 05:59) (98% - 100%)                    I&O's Summary    2018 07:01  -  2018 07:00  --------------------------------------------------------  IN: 0 mL / OUT: 900 mL / NET: -900 mL    PHYSICAL EXAM:  	Constitutional: appears to be in pain (grimacing slightly), well-developed, poorly nourished  	Ears, Nose, Mouth, and Throat: normal external ears and nose, normal hearing, dry oral mucosa  	Eyes: normal conjunctiva, EOMI, PERRL  	Neck: supple, no JVD  	Respiratory: Clear to auscultation bilaterally. No wheezes, rales or rhonchi. Normal respiratory effort  	Cardiovascular: RRR, no M/R/G, no edema, 2+ Peripheral Pulses  	Abdomen: soft, +suprapubic TTP, no rigidity/guarding/rebound, +BS, RUQ tenderness  	Skin: warm, dry, no rash  	Neurologic: sensation grossly intact, CN grossly intact, +RUE contracture and weakness (doesn't follow commands)  	Musculoskeletal: no clubbing, no cyanosis, no joint swelling  Psychiatric: A&O x1-2, no agitation, anxiety                                            LABS:      139  |  110<H>  |  8   ----------------------------<  94  2.8<LL>   |  19<L>  |  0.66      134<L>  |  107  |  12  ----------------------------<  96  3.2<L>   |  15<L>  |  0.66    Ca    7.9<L>      2018 05:10  Ca    7.9<L>      2018 15:55  Phos  2.0       Mg     1.7         TPro  6.0  /  Alb  2.6<L>  /  TBili  0.5  /  DBili  x   /  AST  114<H>  /  ALT  80<H>  /  AlkPhos  134<H>    TPro  6.6  /  Alb  2.7<L>  /  TBili  0.7  /  DBili  x   /  AST  113<H>  /  ALT  68<H>  /  AlkPhos  132<H>                    Urinalysis Basic - ( 2018 17:40 )  Color: LIGHT ORANGE / Appearance: CLEAR / S.009 / pH: 7.0  Gluc: NEGATIVE / Ketone: NEGATIVE  / Bili: NEGATIVE / Urobili: SMALL   Blood: LARGE / Protein: 50 / Nitrite: NEGATIVE   Leuk Esterase: MODERATE / RBC: 26-50 / WBC 6-10   Sq Epi: FEW / Non Sq Epi: x / Bacteria: NEGATIVE                            10.4   5.54  )-----------( 101      ( 2018 05:10 )             30.3                         9.7    8.10  )-----------( 125      ( 2018 17:51 )             28.5           RADIOLOGY & ADDITIONAL TESTS:  US Abdomen Limited (18 @ 20:19)  IMPRESSION:   Gallbladder is mildly distended however there is no wall thickening.   There is trace pericholecystic fluid and perihepatic fluid. There is   gallbladder sludge. No stones are seen. There was no sonographic Huynh   sign. Findings are equivocal for acute cholecystitis. Correlate   clinically.    CT Abdomen and Pelvis w/ IV Cont (18 @ 19:19)  IMPRESSION: Wall thickening of the rectum and sigmoid colon with trace   presacral fluid, suggestive of proctocolitis. Correlate clinically.    Xray Chest 1 View AP/PA (18 @ 16:28)  IMPRESSION:  Underinflated but otherwise clear lungs. No pleural effusions or   pneumothorax.  Cardiac and mediastinal silhouettes grossly within normal limits.  Trachea midline.  Gracile osteopenic osseous structures suggesting underlying neuromuscular   disease.    Imaging Personally Reviewed:  [x ] YES  [ ] NO      Consultants involved in case:   GENERAL SURGERY:   Recommend:  1) IV antibiotic coverage for enteric pathogens. Colitis can result in the translocation of colonic sindi.  2) IVF resuscitation.   3) Urine culture.  4) Trend abdominal examination and LFTs. If RUQ tenderness becomes clearly evident, is worsening, or if LFTs are up-trending, recommend HIDA Scan to definitively rule out cholecystitis.  Will follow    Consultant(s) Notes Reviewed:  [X ] YES  [ ] NO:   Care Discussed with Consultants/Other Providers [ ] YES  [ ]

## 2018-11-22 NOTE — PROGRESS NOTE ADULT - PROBLEM SELECTOR PLAN 4
UA positive and javier placed in ED  - c/w Abx as above  - f/u blood & urine cultures UA positive with large blood, neg bacteria, moderate LE, and javier placed by urology in ED  - c/w Abx as above  - f/u blood & urine cultures

## 2018-11-22 NOTE — H&P ADULT - NSHPLABSRESULTS_GEN_ALL_CORE
134<L>  |  107  |  12  ----------------------------<  96  3.2<L>   |  15<L>  |  0.66    Ca    7.9<L>      2018 15:55    TPro  6.6  /  Alb  2.7<L>  /  TBili  0.7  /  DBili  x   /  AST  113<H>  /  ALT  68<H>  /  AlkPhos  132<H>                              9.7    8.10  )-----------( 125      ( 2018 17:51 )             28.5             LIVER FUNCTIONS - ( 2018 15:55 )  Alb: 2.7 g/dL / Pro: 6.6 g/dL / ALK PHOS: 132 u/L / ALT: 68 u/L / AST: 113 u/L / GGT: x                 Urinalysis Basic - ( 2018 17:40 )    Color: LIGHT ORANGE / Appearance: CLEAR / S.009 / pH: 7.0  Gluc: NEGATIVE / Ketone: NEGATIVE  / Bili: NEGATIVE / Urobili: SMALL   Blood: LARGE / Protein: 50 / Nitrite: NEGATIVE   Leuk Esterase: MODERATE / RBC: 26-50 / WBC 6-10   Sq Epi: FEW / Non Sq Epi: x / Bacteria: NEGATIVE        17:51 - VBG - pH: 7.35  | pCO2: 40    | pO2: 25    | Lactate: 1.9
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
## 2018-11-23 LAB
ALBUMIN SERPL ELPH-MCNC: 2.4 G/DL — LOW (ref 3.3–5)
ALP SERPL-CCNC: 130 U/L — HIGH (ref 40–120)
ALT FLD-CCNC: 68 U/L — HIGH (ref 4–41)
AST SERPL-CCNC: 78 U/L — HIGH (ref 4–40)
BASOPHILS # BLD AUTO: 0.02 K/UL — SIGNIFICANT CHANGE UP (ref 0–0.2)
BASOPHILS NFR BLD AUTO: 0.5 % — SIGNIFICANT CHANGE UP (ref 0–2)
BILIRUB SERPL-MCNC: 0.3 MG/DL — SIGNIFICANT CHANGE UP (ref 0.2–1.2)
BUN SERPL-MCNC: 9 MG/DL — SIGNIFICANT CHANGE UP (ref 7–23)
BUN SERPL-MCNC: 9 MG/DL — SIGNIFICANT CHANGE UP (ref 7–23)
CALCIUM SERPL-MCNC: 8 MG/DL — LOW (ref 8.4–10.5)
CALCIUM SERPL-MCNC: 8 MG/DL — LOW (ref 8.4–10.5)
CHLORIDE SERPL-SCNC: 111 MMOL/L — HIGH (ref 98–107)
CHLORIDE SERPL-SCNC: 111 MMOL/L — HIGH (ref 98–107)
CO2 SERPL-SCNC: 18 MMOL/L — LOW (ref 22–31)
CO2 SERPL-SCNC: 18 MMOL/L — LOW (ref 22–31)
CREAT SERPL-MCNC: 0.67 MG/DL — SIGNIFICANT CHANGE UP (ref 0.5–1.3)
CREAT SERPL-MCNC: 0.67 MG/DL — SIGNIFICANT CHANGE UP (ref 0.5–1.3)
EOSINOPHIL # BLD AUTO: 0.12 K/UL — SIGNIFICANT CHANGE UP (ref 0–0.5)
EOSINOPHIL NFR BLD AUTO: 2.7 % — SIGNIFICANT CHANGE UP (ref 0–6)
GLUCOSE SERPL-MCNC: 86 MG/DL — SIGNIFICANT CHANGE UP (ref 70–99)
GLUCOSE SERPL-MCNC: 86 MG/DL — SIGNIFICANT CHANGE UP (ref 70–99)
HAV IGM SER-ACNC: NONREACTIVE — SIGNIFICANT CHANGE UP
HBV CORE AB SER-ACNC: REACTIVE — SIGNIFICANT CHANGE UP
HBV CORE IGM SER-ACNC: NONREACTIVE — SIGNIFICANT CHANGE UP
HBV SURFACE AB SER-ACNC: REACTIVE — SIGNIFICANT CHANGE UP
HBV SURFACE AG SER-ACNC: NONREACTIVE — SIGNIFICANT CHANGE UP
HCT VFR BLD CALC: 30.6 % — LOW (ref 39–50)
HCV AB S/CO SERPL IA: 0.1 S/CO — SIGNIFICANT CHANGE UP
HCV AB SERPL-IMP: SIGNIFICANT CHANGE UP
HGB BLD-MCNC: 10.4 G/DL — LOW (ref 13–17)
IMM GRANULOCYTES # BLD AUTO: 0.02 # — SIGNIFICANT CHANGE UP
IMM GRANULOCYTES NFR BLD AUTO: 0.5 % — SIGNIFICANT CHANGE UP (ref 0–1.5)
LYMPHOCYTES # BLD AUTO: 1.32 K/UL — SIGNIFICANT CHANGE UP (ref 1–3.3)
LYMPHOCYTES # BLD AUTO: 30.1 % — SIGNIFICANT CHANGE UP (ref 13–44)
MAGNESIUM SERPL-MCNC: 1.8 MG/DL — SIGNIFICANT CHANGE UP (ref 1.6–2.6)
MAGNESIUM SERPL-MCNC: 1.8 MG/DL — SIGNIFICANT CHANGE UP (ref 1.6–2.6)
MCHC RBC-ENTMCNC: 31 PG — SIGNIFICANT CHANGE UP (ref 27–34)
MCHC RBC-ENTMCNC: 34 % — SIGNIFICANT CHANGE UP (ref 32–36)
MCV RBC AUTO: 91.3 FL — SIGNIFICANT CHANGE UP (ref 80–100)
MONOCYTES # BLD AUTO: 0.26 K/UL — SIGNIFICANT CHANGE UP (ref 0–0.9)
MONOCYTES NFR BLD AUTO: 5.9 % — SIGNIFICANT CHANGE UP (ref 2–14)
NEUTROPHILS # BLD AUTO: 2.65 K/UL — SIGNIFICANT CHANGE UP (ref 1.8–7.4)
NEUTROPHILS NFR BLD AUTO: 60.3 % — SIGNIFICANT CHANGE UP (ref 43–77)
NRBC # FLD: 0 — SIGNIFICANT CHANGE UP
PHOSPHATE SERPL-MCNC: 3.9 MG/DL — SIGNIFICANT CHANGE UP (ref 2.5–4.5)
PHOSPHATE SERPL-MCNC: 3.9 MG/DL — SIGNIFICANT CHANGE UP (ref 2.5–4.5)
PLATELET # BLD AUTO: 105 K/UL — LOW (ref 150–400)
PMV BLD: 12.4 FL — SIGNIFICANT CHANGE UP (ref 7–13)
POTASSIUM SERPL-MCNC: 3.5 MMOL/L — SIGNIFICANT CHANGE UP (ref 3.5–5.3)
POTASSIUM SERPL-MCNC: 3.5 MMOL/L — SIGNIFICANT CHANGE UP (ref 3.5–5.3)
POTASSIUM SERPL-SCNC: 3.5 MMOL/L — SIGNIFICANT CHANGE UP (ref 3.5–5.3)
POTASSIUM SERPL-SCNC: 3.5 MMOL/L — SIGNIFICANT CHANGE UP (ref 3.5–5.3)
PROT SERPL-MCNC: 5.8 G/DL — LOW (ref 6–8.3)
RBC # BLD: 3.35 M/UL — LOW (ref 4.2–5.8)
RBC # FLD: 11.9 % — SIGNIFICANT CHANGE UP (ref 10.3–14.5)
SODIUM SERPL-SCNC: 138 MMOL/L — SIGNIFICANT CHANGE UP (ref 135–145)
SODIUM SERPL-SCNC: 138 MMOL/L — SIGNIFICANT CHANGE UP (ref 135–145)
SPECIMEN SOURCE: SIGNIFICANT CHANGE UP
WBC # BLD: 4.39 K/UL — SIGNIFICANT CHANGE UP (ref 3.8–10.5)
WBC # FLD AUTO: 4.39 K/UL — SIGNIFICANT CHANGE UP (ref 3.8–10.5)

## 2018-11-23 PROCEDURE — 99233 SBSQ HOSP IP/OBS HIGH 50: CPT | Mod: GC

## 2018-11-23 PROCEDURE — 78226 HEPATOBILIARY SYSTEM IMAGING: CPT | Mod: 26,GC

## 2018-11-23 RX ADMIN — SODIUM CHLORIDE 100 MILLILITER(S): 9 INJECTION INTRAMUSCULAR; INTRAVENOUS; SUBCUTANEOUS at 22:27

## 2018-11-23 RX ADMIN — Medication 100 MILLIGRAM(S): at 14:42

## 2018-11-23 RX ADMIN — Medication 100 MILLIGRAM(S): at 11:38

## 2018-11-23 RX ADMIN — Medication 200 MILLIGRAM(S): at 09:05

## 2018-11-23 RX ADMIN — PANTOPRAZOLE SODIUM 40 MILLIGRAM(S): 20 TABLET, DELAYED RELEASE ORAL at 06:11

## 2018-11-23 RX ADMIN — LACOSAMIDE 150 MILLIGRAM(S): 50 TABLET ORAL at 22:25

## 2018-11-23 RX ADMIN — Medication 100 MILLIGRAM(S): at 06:14

## 2018-11-23 RX ADMIN — Medication 100 MILLIGRAM(S): at 22:25

## 2018-11-23 RX ADMIN — ATORVASTATIN CALCIUM 20 MILLIGRAM(S): 80 TABLET, FILM COATED ORAL at 22:26

## 2018-11-23 RX ADMIN — Medication 200 MILLIGRAM(S): at 22:25

## 2018-11-23 RX ADMIN — SODIUM CHLORIDE 100 MILLILITER(S): 9 INJECTION INTRAMUSCULAR; INTRAVENOUS; SUBCUTANEOUS at 06:10

## 2018-11-23 RX ADMIN — Medication 10 MILLIGRAM(S): at 06:12

## 2018-11-23 RX ADMIN — SENNA PLUS 2 TABLET(S): 8.6 TABLET ORAL at 22:26

## 2018-11-23 RX ADMIN — CHLORHEXIDINE GLUCONATE 1 APPLICATION(S): 213 SOLUTION TOPICAL at 17:36

## 2018-11-23 RX ADMIN — LACOSAMIDE 100 MILLIGRAM(S): 50 TABLET ORAL at 06:10

## 2018-11-23 RX ADMIN — Medication 200 MILLIGRAM(S): at 09:06

## 2018-11-23 RX ADMIN — CHLORHEXIDINE GLUCONATE 1 APPLICATION(S): 213 SOLUTION TOPICAL at 06:10

## 2018-11-23 RX ADMIN — Medication 10 MILLIGRAM(S): at 17:34

## 2018-11-23 RX ADMIN — ARIPIPRAZOLE 5 MILLIGRAM(S): 15 TABLET ORAL at 11:38

## 2018-11-23 RX ADMIN — Medication 200 MILLIGRAM(S): at 22:26

## 2018-11-23 NOTE — PROGRESS NOTE ADULT - PROBLEM SELECTOR PLAN 4
UA positive with large blood, neg bacteria, moderate LE, and javier placed by urology in ED  - c/w Abx as above  - f/u urine cultures

## 2018-11-23 NOTE — PROGRESS NOTE ADULT - SUBJECTIVE AND OBJECTIVE BOX
Dr. Lidia Rutledge, PGY1  Pager [phone]    TASIA WARE  37y  MRN: [id number]    Subjective:  Patient is a 37y old  Male who presents with a chief complaint of fever (2018 10:20)      Overnight:   No acute events. This morning patient is sleeping comfortably, alert when spoken to but not responding to questions.       MEDICATIONS  (STANDING):  ARIPiprazole 5 milliGRAM(s) Oral daily  atorvastatin 20 milliGRAM(s) Oral at bedtime  baclofen 10 milliGRAM(s) Oral two times a day  chlorhexidine 4% Liquid 1 Application(s) Topical two times a day  ciprofloxacin   IVPB 400 milliGRAM(s) IV Intermittent every 12 hours  docusate sodium 100 milliGRAM(s) Oral daily  lacosamide 100 milliGRAM(s) Oral <User Schedule>  lacosamide 150 milliGRAM(s) Oral <User Schedule>  metroNIDAZOLE  IVPB 500 milliGRAM(s) IV Intermittent every 8 hours  pantoprazole    Tablet 40 milliGRAM(s) Oral before breakfast  polyethylene glycol 3350 17 Gram(s) Oral daily  senna 2 Tablet(s) Oral at bedtime  sodium chloride 0.9%. 1000 milliLiter(s) (100 mL/Hr) IV Continuous <Continuous>  topiramate 200 milliGRAM(s) Oral every 12 hours    MEDICATIONS  (PRN):  acetaminophen   Tablet .. 650 milliGRAM(s) Oral every 6 hours PRN Temp greater or equal to 38C (100.4F), Mild Pain (1 - 3)        Objective:  Vitals: Vital Signs Last 24 Hrs  T(C): 36.8 (18 @ 06:09), Max: 37.2 (18 @ 22:17)  T(F): 98.3 (18 @ 06:09), Max: 98.9 (18 @ 22:17)  HR: 65 (18 @ 06:09) (65 - 75)  BP: 114/78 (18 @ 06:09) (101/65 - 114/78)  BP(mean): --  RR: 17 (18 @ 06:09) (17 - 18)  SpO2: 100% (18 @ 06:09) (100% - 100%)                I&O's Summary  2018 07:01  -  2018 07:00  --------------------------------------------------------  IN: 0 mL / OUT: 1800 mL / NET: -1800 mL      PHYSICAL EXAM:  	Constitutional: resting, well-developed, poorly nourished  	Ears, Nose, Mouth, and Throat: normal external ears and nose, normal hearing, dry oral mucosa  	Eyes: normal conjunctiva, EOMI, PERRL  	Neck: supple, no JVD  	Respiratory: Clear to auscultation bilaterally. No wheezes, rales or rhonchi. Normal respiratory effort  	Cardiovascular: RRR, no M/R/G, no edema, 2+ Peripheral Pulses  	Abdomen: soft, +suprapubic TTP, no rigidity/guarding/rebound, +BS, RUQ tenderness  	Skin: warm, dry, no rash  	Neurologic: sensation grossly intact, CN grossly intact, +RUE contracture and weakness (doesn't follow commands)  	Musculoskeletal: no clubbing, no cyanosis, no joint swelling  Psychiatric: A&O x1-2, no agitation, anxiety                                            LABS:      138  |  111<H>  |  9   ----------------------------<  86  3.5   |  18<L>  |  0.67      139  |  110<H>  |  8   ----------------------------<  94  2.8<LL>   |  19<L>  |  0.66      134<L>  |  107  |  12  ----------------------------<  96  3.2<L>   |  15<L>  |  0.66    Ca    8.0<L>      2018 06:00  Ca    7.9<L>      2018 05:10  Ca    7.9<L>      2018 15:55  Phos  3.9       Mg     1.8         TPro  5.8<L>  /  Alb  2.4<L>  /  TBili  0.3  /  DBili  x   /  AST  78<H>  /  ALT  68<H>  /  AlkPhos  130<H>    TPro  6.0  /  Alb  2.6<L>  /  TBili  0.5  /  DBili  x   /  AST  114<H>  /  ALT  80<H>  /  AlkPhos  134<H>    TPro  6.6  /  Alb  2.7<L>  /  TBili  0.7  /  DBili  x   /  AST  113<H>  /  ALT  68<H>  /  AlkPhos  132<H>                      Urinalysis Basic - ( 2018 17:40 )  Color: LIGHT ORANGE / Appearance: CLEAR / S.009 / pH: 7.0  Gluc: NEGATIVE / Ketone: NEGATIVE  / Bili: NEGATIVE / Urobili: SMALL   Blood: LARGE / Protein: 50 / Nitrite: NEGATIVE   Leuk Esterase: MODERATE / RBC: 26-50 / WBC 6-10   Sq Epi: FEW / Non Sq Epi: x / Bacteria: NEGATIVE                          10.4   4.39  )-----------( 105      ( 2018 06:00 )             30.6                         10.4   5.54  )-----------( 101      ( 2018 05:10 )             30.3                         9.7    8.10  )-----------( 125      ( 2018 17:51 )             28.5           RADIOLOGY & ADDITIONAL TESTS:  US Abdomen Limited (18 @ 20:19)  IMPRESSION:   Gallbladder is mildly distended however there is no wall thickening.   There is trace pericholecystic fluid and perihepatic fluid. There is   gallbladder sludge. No stones are seen. There was no sonographic Huynh   sign. Findings are equivocal for acute cholecystitis. Correlate   clinically.      CT Abdomen and Pelvis w/ IV Cont (18 @ 19:19)  IMPRESSION: Wall thickening of the rectum and sigmoid colon with trace   presacral fluid, suggestive of proctocolitis. Correlate clinically.    Imaging Personally Reviewed:  [ x] YES  [ ] NO      Consultants involved in case:   GENERAL SURGERY:  1) IV antibiotic coverage for enteric pathogens. Colitis can result in the translocation of colonic sindi.  2) IVF resuscitation.   3) Urine culture.  4) Trend abdominal examination and LFTs. If RUQ tenderness becomes clearly evident, is worsening, or if LFTs are up-trending, recommend HIDA Scan to definitively rule out cholecystitis.    Consultant(s) Notes Reviewed:  [ ] YES  [ ] NO:   Care Discussed with Consultants/Other Providers [ ] YES  [ ]

## 2018-11-23 NOTE — PROGRESS NOTE ADULT - SUBJECTIVE AND OBJECTIVE BOX
SUBJECTIVE: 38yo Man seen and examined during morning rounds. No acute events overnight. Afebrile, VS stable. Pain well controlled with current regimen.     OBJECTIVE:    Physical Exam:  Gen: Resting in bed, NAD, alert and oriented  Resp: respirations unlabored, no increased WOB   Abd: soft, NT, ND, no rebound or guarding    Vital Signs Last 24 Hrs  T(C): 36.8 (23 Nov 2018 06:09), Max: 37.2 (22 Nov 2018 22:17)  T(F): 98.3 (23 Nov 2018 06:09), Max: 98.9 (22 Nov 2018 22:17)  HR: 65 (23 Nov 2018 06:09) (65 - 75)  BP: 114/78 (23 Nov 2018 06:09) (101/65 - 114/78)  BP(mean): --  RR: 17 (23 Nov 2018 06:09) (17 - 18)  SpO2: 100% (23 Nov 2018 06:09) (100% - 100%)    I&O's Detail    22 Nov 2018 07:01  -  23 Nov 2018 07:00  --------------------------------------------------------  IN:  Total IN: 0 mL    OUT:    Indwelling Catheter - Urethral: 1800 mL  Total OUT: 1800 mL    Total NET: -1800 mL          MEDICATIONS  (STANDING):  ARIPiprazole 5 milliGRAM(s) Oral daily  atorvastatin 20 milliGRAM(s) Oral at bedtime  baclofen 10 milliGRAM(s) Oral two times a day  chlorhexidine 4% Liquid 1 Application(s) Topical two times a day  ciprofloxacin   IVPB 400 milliGRAM(s) IV Intermittent every 12 hours  docusate sodium 100 milliGRAM(s) Oral daily  lacosamide 100 milliGRAM(s) Oral <User Schedule>  lacosamide 150 milliGRAM(s) Oral <User Schedule>  metroNIDAZOLE  IVPB 500 milliGRAM(s) IV Intermittent every 8 hours  pantoprazole    Tablet 40 milliGRAM(s) Oral before breakfast  polyethylene glycol 3350 17 Gram(s) Oral daily  senna 2 Tablet(s) Oral at bedtime  sodium chloride 0.9%. 1000 milliLiter(s) (100 mL/Hr) IV Continuous <Continuous>  topiramate 200 milliGRAM(s) Oral every 12 hours    MEDICATIONS  (PRN):  acetaminophen   Tablet .. 650 milliGRAM(s) Oral every 6 hours PRN Temp greater or equal to 38C (100.4F), Mild Pain (1 - 3)      LABS:                        10.4   4.39  )-----------( 105      ( 23 Nov 2018 06:00 )             30.6       11-23    138  |  111<H>  |  9   ----------------------------<  86  3.5   |  18<L>  |  0.67    Ca    8.0<L>      23 Nov 2018 06:00  Phos  3.9     11-23  Mg     1.8     11-23    TPro  5.8<L>  /  Alb  2.4<L>  /  TBili  0.3  /  DBili  x   /  AST  78<H>  /  ALT  68<H>  /  AlkPhos  130<H>  11-23          NEGATIVE 11-21-18 @ 17:40

## 2018-11-24 DIAGNOSIS — R74.8 ABNORMAL LEVELS OF OTHER SERUM ENZYMES: ICD-10-CM

## 2018-11-24 LAB
ALBUMIN SERPL ELPH-MCNC: 2.3 G/DL — LOW (ref 3.3–5)
ALP SERPL-CCNC: 116 U/L — SIGNIFICANT CHANGE UP (ref 40–120)
ALT FLD-CCNC: 67 U/L — HIGH (ref 4–41)
AST SERPL-CCNC: 73 U/L — HIGH (ref 4–40)
BACTERIA UR CULT: SIGNIFICANT CHANGE UP
BILIRUB SERPL-MCNC: 0.3 MG/DL — SIGNIFICANT CHANGE UP (ref 0.2–1.2)
BUN SERPL-MCNC: 9 MG/DL — SIGNIFICANT CHANGE UP (ref 7–23)
CALCIUM SERPL-MCNC: 8.1 MG/DL — LOW (ref 8.4–10.5)
CHLORIDE SERPL-SCNC: 112 MMOL/L — HIGH (ref 98–107)
CO2 SERPL-SCNC: 17 MMOL/L — LOW (ref 22–31)
CREAT SERPL-MCNC: 0.72 MG/DL — SIGNIFICANT CHANGE UP (ref 0.5–1.3)
GLUCOSE SERPL-MCNC: 105 MG/DL — HIGH (ref 70–99)
HCV RNA SERPL NAA DL=5-ACNC: NOT DETECTED IU/ML — SIGNIFICANT CHANGE UP
HCV RNA SPEC NAA+PROBE-LOG IU: SIGNIFICANT CHANGE UP LOGIU/ML
MAGNESIUM SERPL-MCNC: 1.7 MG/DL — SIGNIFICANT CHANGE UP (ref 1.6–2.6)
PHOSPHATE SERPL-MCNC: 3.9 MG/DL — SIGNIFICANT CHANGE UP (ref 2.5–4.5)
POTASSIUM SERPL-MCNC: 3.1 MMOL/L — LOW (ref 3.5–5.3)
POTASSIUM SERPL-SCNC: 3.1 MMOL/L — LOW (ref 3.5–5.3)
PROT SERPL-MCNC: 5.8 G/DL — LOW (ref 6–8.3)
SODIUM SERPL-SCNC: 138 MMOL/L — SIGNIFICANT CHANGE UP (ref 135–145)

## 2018-11-24 PROCEDURE — 99233 SBSQ HOSP IP/OBS HIGH 50: CPT

## 2018-11-24 RX ORDER — POTASSIUM CHLORIDE 20 MEQ
40 PACKET (EA) ORAL ONCE
Qty: 0 | Refills: 0 | Status: COMPLETED | OUTPATIENT
Start: 2018-11-24 | End: 2018-11-24

## 2018-11-24 RX ADMIN — Medication 10 MILLIGRAM(S): at 06:15

## 2018-11-24 RX ADMIN — ARIPIPRAZOLE 5 MILLIGRAM(S): 15 TABLET ORAL at 12:14

## 2018-11-24 RX ADMIN — Medication 200 MILLIGRAM(S): at 21:27

## 2018-11-24 RX ADMIN — Medication 200 MILLIGRAM(S): at 08:53

## 2018-11-24 RX ADMIN — Medication 100 MILLIGRAM(S): at 06:14

## 2018-11-24 RX ADMIN — Medication 650 MILLIGRAM(S): at 21:58

## 2018-11-24 RX ADMIN — Medication 100 MILLIGRAM(S): at 12:14

## 2018-11-24 RX ADMIN — Medication 650 MILLIGRAM(S): at 21:28

## 2018-11-24 RX ADMIN — Medication 100 MILLIGRAM(S): at 13:11

## 2018-11-24 RX ADMIN — Medication 40 MILLIEQUIVALENT(S): at 08:54

## 2018-11-24 RX ADMIN — PANTOPRAZOLE SODIUM 40 MILLIGRAM(S): 20 TABLET, DELAYED RELEASE ORAL at 06:14

## 2018-11-24 RX ADMIN — LACOSAMIDE 100 MILLIGRAM(S): 50 TABLET ORAL at 06:15

## 2018-11-24 RX ADMIN — Medication 200 MILLIGRAM(S): at 21:28

## 2018-11-24 RX ADMIN — CHLORHEXIDINE GLUCONATE 1 APPLICATION(S): 213 SOLUTION TOPICAL at 18:24

## 2018-11-24 RX ADMIN — POLYETHYLENE GLYCOL 3350 17 GRAM(S): 17 POWDER, FOR SOLUTION ORAL at 12:14

## 2018-11-24 RX ADMIN — SENNA PLUS 2 TABLET(S): 8.6 TABLET ORAL at 21:28

## 2018-11-24 RX ADMIN — LACOSAMIDE 150 MILLIGRAM(S): 50 TABLET ORAL at 21:44

## 2018-11-24 RX ADMIN — ATORVASTATIN CALCIUM 20 MILLIGRAM(S): 80 TABLET, FILM COATED ORAL at 21:28

## 2018-11-24 RX ADMIN — Medication 200 MILLIGRAM(S): at 08:54

## 2018-11-24 RX ADMIN — Medication 10 MILLIGRAM(S): at 18:24

## 2018-11-24 RX ADMIN — Medication 650 MILLIGRAM(S): at 10:00

## 2018-11-24 RX ADMIN — SODIUM CHLORIDE 100 MILLILITER(S): 9 INJECTION INTRAMUSCULAR; INTRAVENOUS; SUBCUTANEOUS at 13:12

## 2018-11-24 RX ADMIN — CHLORHEXIDINE GLUCONATE 1 APPLICATION(S): 213 SOLUTION TOPICAL at 06:19

## 2018-11-24 RX ADMIN — Medication 100 MILLIGRAM(S): at 22:51

## 2018-11-24 RX ADMIN — Medication 650 MILLIGRAM(S): at 09:06

## 2018-11-24 RX ADMIN — SODIUM CHLORIDE 100 MILLILITER(S): 9 INJECTION INTRAMUSCULAR; INTRAVENOUS; SUBCUTANEOUS at 06:13

## 2018-11-24 NOTE — PROGRESS NOTE ADULT - SUBJECTIVE AND OBJECTIVE BOX
CHIEF COMPLAINT: fever    Interval Events: No acute event overnight. Patient is seen and examined at the bedside this morning. He appear comfortably, alert when spoken to and responding to simple questions.     OBJECTIVE:  Vital Signs Last 24 Hrs  T(C): 36.7 (24 Nov 2018 06:17), Max: 37.4 (23 Nov 2018 14:40)  T(F): 98.1 (24 Nov 2018 06:17), Max: 99.4 (23 Nov 2018 14:40)  HR: 63 (24 Nov 2018 06:17) (61 - 65)  BP: 102/63 (24 Nov 2018 06:17) (99/61 - 102/63)  BP(mean): --  RR: 18 (24 Nov 2018 06:17) (17 - 18)  SpO2: 96% (24 Nov 2018 06:17) (96% - 100%)    11-23 @ 07:01  -  11-24 @ 07:00  --------------------------------------------------------  IN: 1600 mL / OUT: 900 mL / NET: 700 mL      CAPILLARY BLOOD GLUCOSE          PHYSICAL EXAM:  Constitutional: resting, well-developed, poorly nourished  	Ears, Nose, Mouth, and Throat: normal external ears and nose, normal hearing, dry oral mucosa  	Eyes: normal conjunctiva, EOMI, PERRL  	Neck: supple, no JVD  	Respiratory: Clear to auscultation bilaterally. No wheezes, rales or rhonchi. Normal respiratory effort  	Cardiovascular: RRR, no M/R/G, no edema, 2+ Peripheral Pulses  	Abdomen: soft, no rigidity/guarding/rebound, +BS, non tender to palpation  	Skin: warm, dry, no rash  	Neurologic: sensation grossly intact, CN grossly intact, +RUE contracture and weakness (doesn't follow commands)  	Musculoskeletal: no clubbing, no cyanosis, no joint swelling  Psychiatric: A&O x1-2, no agitation, anxiety         HOSPITAL MEDICATIONS:    ciprofloxacin   IVPB 400 milliGRAM(s) IV Intermittent every 12 hours  metroNIDAZOLE  IVPB 500 milliGRAM(s) IV Intermittent every 8 hours      atorvastatin 20 milliGRAM(s) Oral at bedtime      acetaminophen   Tablet .. 650 milliGRAM(s) Oral every 6 hours PRN  ARIPiprazole 5 milliGRAM(s) Oral daily  baclofen 10 milliGRAM(s) Oral two times a day  lacosamide 100 milliGRAM(s) Oral <User Schedule>  lacosamide 150 milliGRAM(s) Oral <User Schedule>  topiramate 200 milliGRAM(s) Oral every 12 hours    docusate sodium 100 milliGRAM(s) Oral daily  pantoprazole    Tablet 40 milliGRAM(s) Oral before breakfast  polyethylene glycol 3350 17 Gram(s) Oral daily  senna 2 Tablet(s) Oral at bedtime        sodium chloride 0.9%. 1000 milliLiter(s) IV Continuous <Continuous>      chlorhexidine 4% Liquid 1 Application(s) Topical two times a day        LABS:                        10.4   4.39  )-----------( 105      ( 23 Nov 2018 06:00 )             30.6     Hgb Trend: 10.4<--, 10.4<--, 9.7<--  11-24    138  |  112<H>  |  9   ----------------------------<  105<H>  3.1<L>   |  17<L>  |  0.72    Ca    8.1<L>      24 Nov 2018 05:45  Phos  3.9     11-24  Mg     1.7     11-24    TPro  5.8<L>  /  Alb  2.3<L>  /  TBili  0.3  /  DBili  x   /  AST  73<H>  /  ALT  67<H>  /  AlkPhos  116  11-24    Creatinine Trend: 0.72<--, 0.67<--, 0.66<--, 0.66<--, 0.77<--, 0.76<--          RADIOLOGY:  < from: NM Hepatobiliary Scan w/wo Gall Bladder (11.23.18 @ 13:19) >  CLINICAL INFORMATION: 37-year-old male with right upper quadrant   abdominal pain, mildly distended gallbladder, trace pericholecystic fluid   and perihepatic fluid on ultrasound. Evaluate for acute cholecystitis.    TECHNIQUE: Dynamic imaging of the anterior abdomen was performed for 65   minutes following injection of radiotracer. Static images of theabdomen   in the anterior, right lateral and right anterior oblique views were   obtained immediately thereafter.    COMPARISON:  No prior hepatobiliary scan is available for comparison.     FINDINGS: There is prompt, homogeneous uptake of radiotracer by the   hepatocytes. Activity is first seen in the gallbladder at 60 minutes and   in the bowel at 15 minutes. There is good clearance of activity from the   liver by the end of the study.    IMPRESSION: Normal hepatobiliary scan.    < end of copied text >

## 2018-11-24 NOTE — PROGRESS NOTE ADULT - PROBLEM SELECTOR PLAN 4
- UA positive with large blood, neg bacteria, moderate LE, and javier placed by urology in ED  - Urine culture showing 2 GNR, likely contaminant  - Continue to monitor for now

## 2018-11-24 NOTE — CHART NOTE - NSCHARTNOTEFT_GEN_A_CORE
General Surgery  B team   [id number]    HIDA results noted:   < from: NM Hepatobiliary Scan w/wo Gall Bladder (11.23.18 @ 13:19) >  IMPRESSION: Normal hepatobiliary scan.  No radionuclide evidence of acute cholecystitis.  < end of copied text >    No evidence of acute cholecystitis at this time.   Please call back with any further questions or concerns.     Rosie Lee  [id number]

## 2018-11-25 LAB
ALBUMIN SERPL ELPH-MCNC: 2.4 G/DL — LOW (ref 3.3–5)
ALP SERPL-CCNC: 102 U/L — SIGNIFICANT CHANGE UP (ref 40–120)
ALT FLD-CCNC: 60 U/L — HIGH (ref 4–41)
AST SERPL-CCNC: 60 U/L — HIGH (ref 4–40)
BILIRUB SERPL-MCNC: 0.3 MG/DL — SIGNIFICANT CHANGE UP (ref 0.2–1.2)
BUN SERPL-MCNC: 6 MG/DL — LOW (ref 7–23)
CALCIUM SERPL-MCNC: 8.1 MG/DL — LOW (ref 8.4–10.5)
CHLORIDE SERPL-SCNC: 113 MMOL/L — HIGH (ref 98–107)
CO2 SERPL-SCNC: 17 MMOL/L — LOW (ref 22–31)
CREAT SERPL-MCNC: 0.63 MG/DL — SIGNIFICANT CHANGE UP (ref 0.5–1.3)
GLUCOSE SERPL-MCNC: 94 MG/DL — SIGNIFICANT CHANGE UP (ref 70–99)
MAGNESIUM SERPL-MCNC: 1.7 MG/DL — SIGNIFICANT CHANGE UP (ref 1.6–2.6)
PHOSPHATE SERPL-MCNC: 3.7 MG/DL — SIGNIFICANT CHANGE UP (ref 2.5–4.5)
POTASSIUM SERPL-MCNC: 3.2 MMOL/L — LOW (ref 3.5–5.3)
POTASSIUM SERPL-SCNC: 3.2 MMOL/L — LOW (ref 3.5–5.3)
PROT SERPL-MCNC: 5.9 G/DL — LOW (ref 6–8.3)
SODIUM SERPL-SCNC: 139 MMOL/L — SIGNIFICANT CHANGE UP (ref 135–145)

## 2018-11-25 PROCEDURE — 99233 SBSQ HOSP IP/OBS HIGH 50: CPT

## 2018-11-25 RX ORDER — POTASSIUM CHLORIDE 20 MEQ
40 PACKET (EA) ORAL ONCE
Qty: 0 | Refills: 0 | Status: COMPLETED | OUTPATIENT
Start: 2018-11-25 | End: 2018-11-25

## 2018-11-25 RX ADMIN — ARIPIPRAZOLE 5 MILLIGRAM(S): 15 TABLET ORAL at 11:31

## 2018-11-25 RX ADMIN — Medication 200 MILLIGRAM(S): at 21:39

## 2018-11-25 RX ADMIN — SENNA PLUS 2 TABLET(S): 8.6 TABLET ORAL at 21:33

## 2018-11-25 RX ADMIN — Medication 200 MILLIGRAM(S): at 11:29

## 2018-11-25 RX ADMIN — Medication 100 MILLIGRAM(S): at 21:39

## 2018-11-25 RX ADMIN — Medication 100 MILLIGRAM(S): at 06:39

## 2018-11-25 RX ADMIN — Medication 100 MILLIGRAM(S): at 11:31

## 2018-11-25 RX ADMIN — Medication 100 MILLIGRAM(S): at 14:37

## 2018-11-25 RX ADMIN — Medication 200 MILLIGRAM(S): at 21:33

## 2018-11-25 RX ADMIN — PANTOPRAZOLE SODIUM 40 MILLIGRAM(S): 20 TABLET, DELAYED RELEASE ORAL at 06:40

## 2018-11-25 RX ADMIN — POLYETHYLENE GLYCOL 3350 17 GRAM(S): 17 POWDER, FOR SOLUTION ORAL at 11:31

## 2018-11-25 RX ADMIN — CHLORHEXIDINE GLUCONATE 1 APPLICATION(S): 213 SOLUTION TOPICAL at 06:40

## 2018-11-25 RX ADMIN — ATORVASTATIN CALCIUM 20 MILLIGRAM(S): 80 TABLET, FILM COATED ORAL at 21:33

## 2018-11-25 RX ADMIN — SODIUM CHLORIDE 100 MILLILITER(S): 9 INJECTION INTRAMUSCULAR; INTRAVENOUS; SUBCUTANEOUS at 14:37

## 2018-11-25 RX ADMIN — Medication 40 MILLIEQUIVALENT(S): at 11:30

## 2018-11-25 RX ADMIN — SODIUM CHLORIDE 100 MILLILITER(S): 9 INJECTION INTRAMUSCULAR; INTRAVENOUS; SUBCUTANEOUS at 02:57

## 2018-11-25 RX ADMIN — Medication 10 MILLIGRAM(S): at 06:40

## 2018-11-25 RX ADMIN — CHLORHEXIDINE GLUCONATE 1 APPLICATION(S): 213 SOLUTION TOPICAL at 17:57

## 2018-11-25 RX ADMIN — LACOSAMIDE 150 MILLIGRAM(S): 50 TABLET ORAL at 21:33

## 2018-11-25 RX ADMIN — Medication 10 MILLIGRAM(S): at 17:57

## 2018-11-25 RX ADMIN — LACOSAMIDE 100 MILLIGRAM(S): 50 TABLET ORAL at 06:40

## 2018-11-25 NOTE — PROGRESS NOTE ADULT - SUBJECTIVE AND OBJECTIVE BOX
INTERVAL HPI/OVERNIGHT EVENTS: no acute events overnight.     SUBJECTIVE: Patient seen and examined at bedside. Patient reports suprapubic pain but said it is resolving from before. Patient denies chest pain/shortness of breath.     OBJECTIVE:    VITAL SIGNS:  ICU Vital Signs Last 24 Hrs  T(C): 37.1 (25 Nov 2018 06:38), Max: 37.7 (24 Nov 2018 22:10)  T(F): 98.8 (25 Nov 2018 06:38), Max: 99.8 (24 Nov 2018 22:10)  HR: 61 (25 Nov 2018 06:38) (61 - 63)  BP: 112/70 (25 Nov 2018 06:38) (100/65 - 120/70)  BP(mean): --  ABP: --  ABP(mean): --  RR: 18 (25 Nov 2018 06:38) (18 - 18)  SpO2: 98% (25 Nov 2018 06:38) (98% - 100%)        11-24 @ 07:01  -  11-25 @ 07:00  --------------------------------------------------------  IN: 0 mL / OUT: 600 mL / NET: -600 mL    11-25 @ 07:01  -  11-25 @ 10:46  --------------------------------------------------------  IN: 1200 mL / OUT: 800 mL / NET: 400 mL      CAPILLARY BLOOD GLUCOSE      PHYSICAL EXAM:  Constitutional: resting, well-developed, poorly nourished  Ears, Nose, Mouth, and Throat: normal external ears and nose, normal hearing, dry oral mucosa  Eyes: normal conjunctiva, EOMI, PERRL  Neck: supple, no JVD  Respiratory: Clear to auscultation bilaterally. No wheezes, rales or rhonchi. Normal respiratory effort  Cardiovascular: RRR, no M/R/G, no edema, 2+ Peripheral Pulses  Abdomen: soft, no rigidity/guarding/rebound, +BS, non tender to palpation  Skin: warm, dry, no rash  Neurologic: sensation grossly intact, CN grossly intact, +RUE contracture and weakness   Musculoskeletal: no clubbing, no cyanosis, no joint swelling  Psychiatric: A&O x1-2, no agitation, anxiety    MEDICATIONS:  MEDICATIONS  (STANDING):  ARIPiprazole 5 milliGRAM(s) Oral daily  atorvastatin 20 milliGRAM(s) Oral at bedtime  baclofen 10 milliGRAM(s) Oral two times a day  chlorhexidine 4% Liquid 1 Application(s) Topical two times a day  ciprofloxacin   IVPB 400 milliGRAM(s) IV Intermittent every 12 hours  docusate sodium 100 milliGRAM(s) Oral daily  lacosamide 100 milliGRAM(s) Oral <User Schedule>  lacosamide 150 milliGRAM(s) Oral <User Schedule>  metroNIDAZOLE  IVPB 500 milliGRAM(s) IV Intermittent every 8 hours  pantoprazole    Tablet 40 milliGRAM(s) Oral before breakfast  polyethylene glycol 3350 17 Gram(s) Oral daily  potassium chloride   Powder 40 milliEquivalent(s) Oral once  senna 2 Tablet(s) Oral at bedtime  sodium chloride 0.9%. 1000 milliLiter(s) (100 mL/Hr) IV Continuous <Continuous>  topiramate 200 milliGRAM(s) Oral every 12 hours    MEDICATIONS  (PRN):  acetaminophen   Tablet .. 650 milliGRAM(s) Oral every 6 hours PRN Temp greater or equal to 38C (100.4F), Mild Pain (1 - 3)      ALLERGIES:  Allergies    penicillin (Unknown)    Intolerances        LABS:    11-25    139  |  113<H>  |  6<L>  ----------------------------<  94  3.2<L>   |  17<L>  |  0.63    Ca    8.1<L>      25 Nov 2018 06:05  Phos  3.7     11-25  Mg     1.7     11-25    TPro  5.9<L>  /  Alb  2.4<L>  /  TBili  0.3  /  DBili  x   /  AST  60<H>  /  ALT  60<H>  /  AlkPhos  102  11-25          RADIOLOGY & ADDITIONAL TESTS: Reviewed.

## 2018-11-26 LAB
ALBUMIN SERPL ELPH-MCNC: 2.6 G/DL — LOW (ref 3.3–5)
ALP SERPL-CCNC: 100 U/L — SIGNIFICANT CHANGE UP (ref 40–120)
ALT FLD-CCNC: 53 U/L — HIGH (ref 4–41)
AST SERPL-CCNC: 54 U/L — HIGH (ref 4–40)
BACTERIA BLD CULT: SIGNIFICANT CHANGE UP
BILIRUB SERPL-MCNC: 0.3 MG/DL — SIGNIFICANT CHANGE UP (ref 0.2–1.2)
BUN SERPL-MCNC: 7 MG/DL — SIGNIFICANT CHANGE UP (ref 7–23)
CALCIUM SERPL-MCNC: 8.3 MG/DL — LOW (ref 8.4–10.5)
CHLORIDE SERPL-SCNC: 112 MMOL/L — HIGH (ref 98–107)
CO2 SERPL-SCNC: 17 MMOL/L — LOW (ref 22–31)
CREAT SERPL-MCNC: 0.64 MG/DL — SIGNIFICANT CHANGE UP (ref 0.5–1.3)
GLUCOSE SERPL-MCNC: 99 MG/DL — SIGNIFICANT CHANGE UP (ref 70–99)
HCT VFR BLD CALC: 28.1 % — LOW (ref 39–50)
HGB BLD-MCNC: 9.7 G/DL — LOW (ref 13–17)
MAGNESIUM SERPL-MCNC: 1.8 MG/DL — SIGNIFICANT CHANGE UP (ref 1.6–2.6)
MCHC RBC-ENTMCNC: 31.2 PG — SIGNIFICANT CHANGE UP (ref 27–34)
MCHC RBC-ENTMCNC: 34.5 % — SIGNIFICANT CHANGE UP (ref 32–36)
MCV RBC AUTO: 90.4 FL — SIGNIFICANT CHANGE UP (ref 80–100)
NRBC # FLD: 0 — SIGNIFICANT CHANGE UP
PHOSPHATE SERPL-MCNC: 3.6 MG/DL — SIGNIFICANT CHANGE UP (ref 2.5–4.5)
PLATELET # BLD AUTO: 195 K/UL — SIGNIFICANT CHANGE UP (ref 150–400)
PMV BLD: 11.9 FL — SIGNIFICANT CHANGE UP (ref 7–13)
POTASSIUM SERPL-MCNC: 3.2 MMOL/L — LOW (ref 3.5–5.3)
POTASSIUM SERPL-SCNC: 3.2 MMOL/L — LOW (ref 3.5–5.3)
PROT SERPL-MCNC: 6 G/DL — SIGNIFICANT CHANGE UP (ref 6–8.3)
RBC # BLD: 3.11 M/UL — LOW (ref 4.2–5.8)
RBC # FLD: 12.4 % — SIGNIFICANT CHANGE UP (ref 10.3–14.5)
SODIUM SERPL-SCNC: 138 MMOL/L — SIGNIFICANT CHANGE UP (ref 135–145)
WBC # BLD: 4.8 K/UL — SIGNIFICANT CHANGE UP (ref 3.8–10.5)
WBC # FLD AUTO: 4.8 K/UL — SIGNIFICANT CHANGE UP (ref 3.8–10.5)

## 2018-11-26 PROCEDURE — 99233 SBSQ HOSP IP/OBS HIGH 50: CPT | Mod: GC

## 2018-11-26 RX ORDER — LACOSAMIDE 50 MG/1
100 TABLET ORAL
Qty: 0 | Refills: 0 | Status: DISCONTINUED | OUTPATIENT
Start: 2018-11-26 | End: 2018-11-28

## 2018-11-26 RX ORDER — POTASSIUM CHLORIDE 20 MEQ
40 PACKET (EA) ORAL ONCE
Qty: 0 | Refills: 0 | Status: COMPLETED | OUTPATIENT
Start: 2018-11-26 | End: 2018-11-26

## 2018-11-26 RX ORDER — LACOSAMIDE 50 MG/1
150 TABLET ORAL
Qty: 0 | Refills: 0 | Status: DISCONTINUED | OUTPATIENT
Start: 2018-11-26 | End: 2018-11-28

## 2018-11-26 RX ADMIN — POLYETHYLENE GLYCOL 3350 17 GRAM(S): 17 POWDER, FOR SOLUTION ORAL at 12:17

## 2018-11-26 RX ADMIN — SODIUM CHLORIDE 100 MILLILITER(S): 9 INJECTION INTRAMUSCULAR; INTRAVENOUS; SUBCUTANEOUS at 18:03

## 2018-11-26 RX ADMIN — LACOSAMIDE 150 MILLIGRAM(S): 50 TABLET ORAL at 21:06

## 2018-11-26 RX ADMIN — Medication 100 MILLIGRAM(S): at 05:56

## 2018-11-26 RX ADMIN — Medication 100 MILLIGRAM(S): at 12:17

## 2018-11-26 RX ADMIN — Medication 100 MILLIGRAM(S): at 21:49

## 2018-11-26 RX ADMIN — Medication 10 MILLIGRAM(S): at 05:56

## 2018-11-26 RX ADMIN — Medication 10 MILLIGRAM(S): at 18:03

## 2018-11-26 RX ADMIN — Medication 40 MILLIEQUIVALENT(S): at 09:58

## 2018-11-26 RX ADMIN — Medication 200 MILLIGRAM(S): at 11:06

## 2018-11-26 RX ADMIN — Medication 100 MILLIGRAM(S): at 13:54

## 2018-11-26 RX ADMIN — SODIUM CHLORIDE 100 MILLILITER(S): 9 INJECTION INTRAMUSCULAR; INTRAVENOUS; SUBCUTANEOUS at 05:56

## 2018-11-26 RX ADMIN — Medication 200 MILLIGRAM(S): at 09:57

## 2018-11-26 RX ADMIN — CHLORHEXIDINE GLUCONATE 1 APPLICATION(S): 213 SOLUTION TOPICAL at 17:58

## 2018-11-26 RX ADMIN — Medication 200 MILLIGRAM(S): at 21:49

## 2018-11-26 RX ADMIN — Medication 40 MILLIEQUIVALENT(S): at 13:54

## 2018-11-26 RX ADMIN — Medication 200 MILLIGRAM(S): at 21:07

## 2018-11-26 RX ADMIN — PANTOPRAZOLE SODIUM 40 MILLIGRAM(S): 20 TABLET, DELAYED RELEASE ORAL at 05:56

## 2018-11-26 RX ADMIN — ARIPIPRAZOLE 5 MILLIGRAM(S): 15 TABLET ORAL at 12:17

## 2018-11-26 RX ADMIN — CHLORHEXIDINE GLUCONATE 1 APPLICATION(S): 213 SOLUTION TOPICAL at 05:57

## 2018-11-26 RX ADMIN — ATORVASTATIN CALCIUM 20 MILLIGRAM(S): 80 TABLET, FILM COATED ORAL at 21:06

## 2018-11-26 RX ADMIN — LACOSAMIDE 100 MILLIGRAM(S): 50 TABLET ORAL at 05:56

## 2018-11-26 RX ADMIN — SENNA PLUS 2 TABLET(S): 8.6 TABLET ORAL at 21:07

## 2018-11-26 NOTE — PROGRESS NOTE ADULT - SUBJECTIVE AND OBJECTIVE BOX
Dr. Lidia Rutledge, PGY1  Pager [phone]    TASIA WARE  37y  MRN: [id number]    Subjective:  Patient is a 37y old  Male who presents with a chief complaint of fever (25 Nov 2018 10:46)      Overnight:   No acute events. Resting comfortably. Alert and opens eyes, follows interviewer, nods and shakes head to questions. Denies pain by shaking head when asked. Now with condom cath.      MEDICATIONS  (STANDING):  ARIPiprazole 5 milliGRAM(s) Oral daily  atorvastatin 20 milliGRAM(s) Oral at bedtime  baclofen 10 milliGRAM(s) Oral two times a day  chlorhexidine 4% Liquid 1 Application(s) Topical two times a day  ciprofloxacin   IVPB 400 milliGRAM(s) IV Intermittent every 12 hours  docusate sodium 100 milliGRAM(s) Oral daily  lacosamide 100 milliGRAM(s) Oral <User Schedule>  lacosamide 150 milliGRAM(s) Oral <User Schedule>  metroNIDAZOLE  IVPB 500 milliGRAM(s) IV Intermittent every 8 hours  pantoprazole    Tablet 40 milliGRAM(s) Oral before breakfast  polyethylene glycol 3350 17 Gram(s) Oral daily  potassium chloride   Powder 40 milliEquivalent(s) Oral once  senna 2 Tablet(s) Oral at bedtime  sodium chloride 0.9%. 1000 milliLiter(s) (100 mL/Hr) IV Continuous <Continuous>  topiramate 200 milliGRAM(s) Oral every 12 hours    MEDICATIONS  (PRN):  acetaminophen   Tablet .. 650 milliGRAM(s) Oral every 6 hours PRN Temp greater or equal to 38C (100.4F), Mild Pain (1 - 3)        Objective:  Vitals: Vital Signs Last 24 Hrs  T(C): 36.9 (11-26-18 @ 05:57), Max: 37.2 (11-25-18 @ 15:17)  T(F): 98.4 (11-26-18 @ 05:57), Max: 98.9 (11-25-18 @ 15:17)  HR: 61 (11-26-18 @ 05:57) (61 - 74)  BP: 96/63 (11-26-18 @ 05:57) (96/63 - 102/70)  BP(mean): --  RR: 18 (11-26-18 @ 05:57) (17 - 18)  SpO2: 98% (11-26-18 @ 05:57) (98% - 100%)                  I&O's Summary    25 Nov 2018 07:01  -  26 Nov 2018 07:00  --------------------------------------------------------  IN: 3800 mL / OUT: 2100 mL / NET: 1700 mL      PHYSICAL EXAM:  Constitutional: resting, well-developed, poorly nourished  Ears, Nose, Mouth, and Throat: normal external ears and nose, normal hearing, dry oral mucosa  Eyes: normal conjunctiva, EOMI, PERRL  Neck: supple, no JVD  Respiratory: Clear to auscultation bilaterally. No wheezes, rales or rhonchi. Normal respiratory effort  Cardiovascular: RRR, no M/R/G, no edema, 2+ Peripheral Pulses  Abdomen: soft, nontender, no rigidity/guarding/rebound, +BS  Skin: warm, dry, no rashes or lesions  Neurologic: sensation grossly intact, CN grossly intact, +RUE contracture and weakness (doesn't follow commands)  Musculoskeletal: no clubbing, no cyanosis, no joint swelling  Psychiatric: A&O x1-2, no agitation, anxiety                                               LABS:  11-26    138  |  112<H>  |  7   ----------------------------<  99  3.2<L>   |  17<L>  |  0.64  11-25    139  |  113<H>  |  6<L>  ----------------------------<  94  3.2<L>   |  17<L>  |  0.63  11-24    138  |  112<H>  |  9   ----------------------------<  105<H>  3.1<L>   |  17<L>  |  0.72    Ca    8.3<L>      26 Nov 2018 04:50  Ca    8.1<L>      25 Nov 2018 06:05  Ca    8.1<L>      24 Nov 2018 05:45  Phos  3.6     11-26  Mg     1.8     11-26    TPro  6.0  /  Alb  2.6<L>  /  TBili  0.3  /  DBili  x   /  AST  54<H>  /  ALT  53<H>  /  AlkPhos  100  11-26  TPro  5.9<L>  /  Alb  2.4<L>  /  TBili  0.3  /  DBili  x   /  AST  60<H>  /  ALT  60<H>  /  AlkPhos  102  11-25  TPro  5.8<L>  /  Alb  2.3<L>  /  TBili  0.3  /  DBili  x   /  AST  73<H>  /  ALT  67<H>  /  AlkPhos  116  11-24                                        9.7    4.80  )-----------( 195      ( 26 Nov 2018 04:50 )             28.1           RADIOLOGY & ADDITIONAL TESTS:  NM Hepatobiliary Scan w/wo Gall Bladder (11.23.18 @ 13:19)  IMPRESSION: Normal hepatobiliary scan.  No radionuclide evidence of acute cholecystitis.    Imaging Personally Reviewed:  [x ] YES  [ ] NO      Consultants involved in case:   GENERAL SURGERY:  No evidence of acute cholecystitis at this time.   Please call back with any further questions or concerns    Consultant(s) Notes Reviewed:  [X ] YES  [ ] NO:

## 2018-11-26 NOTE — PROGRESS NOTE ADULT - PROBLEM SELECTOR PLAN 4
- UA positive with large blood, neg bacteria, moderate LE, and javier placed by urology in ED  - Urine culture showing 2 GNR, likely contaminant  - javier removed 11/25, passed TOV, now with condom cath  - Continue to monitor for now

## 2018-11-27 ENCOUNTER — TRANSCRIPTION ENCOUNTER (OUTPATIENT)
Age: 38
End: 2018-11-27

## 2018-11-27 LAB
ALBUMIN SERPL ELPH-MCNC: 2.6 G/DL — LOW (ref 3.3–5)
ALP SERPL-CCNC: 93 U/L — SIGNIFICANT CHANGE UP (ref 40–120)
ALT FLD-CCNC: 50 U/L — HIGH (ref 4–41)
AST SERPL-CCNC: 54 U/L — HIGH (ref 4–40)
BASOPHILS # BLD AUTO: 0.02 K/UL — SIGNIFICANT CHANGE UP (ref 0–0.2)
BASOPHILS NFR BLD AUTO: 0.4 % — SIGNIFICANT CHANGE UP (ref 0–2)
BILIRUB SERPL-MCNC: 0.3 MG/DL — SIGNIFICANT CHANGE UP (ref 0.2–1.2)
BUN SERPL-MCNC: 8 MG/DL — SIGNIFICANT CHANGE UP (ref 7–23)
CALCIUM SERPL-MCNC: 8.3 MG/DL — LOW (ref 8.4–10.5)
CHLORIDE SERPL-SCNC: 111 MMOL/L — HIGH (ref 98–107)
CO2 SERPL-SCNC: 16 MMOL/L — LOW (ref 22–31)
CREAT SERPL-MCNC: 0.63 MG/DL — SIGNIFICANT CHANGE UP (ref 0.5–1.3)
EOSINOPHIL # BLD AUTO: 0.08 K/UL — SIGNIFICANT CHANGE UP (ref 0–0.5)
EOSINOPHIL NFR BLD AUTO: 1.7 % — SIGNIFICANT CHANGE UP (ref 0–6)
GLUCOSE SERPL-MCNC: 94 MG/DL — SIGNIFICANT CHANGE UP (ref 70–99)
HCT VFR BLD CALC: 29.9 % — LOW (ref 39–50)
HGB BLD-MCNC: 10.2 G/DL — LOW (ref 13–17)
IMM GRANULOCYTES # BLD AUTO: 0.02 # — SIGNIFICANT CHANGE UP
IMM GRANULOCYTES NFR BLD AUTO: 0.4 % — SIGNIFICANT CHANGE UP (ref 0–1.5)
LYMPHOCYTES # BLD AUTO: 1.92 K/UL — SIGNIFICANT CHANGE UP (ref 1–3.3)
LYMPHOCYTES # BLD AUTO: 40.5 % — SIGNIFICANT CHANGE UP (ref 13–44)
MAGNESIUM SERPL-MCNC: 1.8 MG/DL — SIGNIFICANT CHANGE UP (ref 1.6–2.6)
MCHC RBC-ENTMCNC: 30.8 PG — SIGNIFICANT CHANGE UP (ref 27–34)
MCHC RBC-ENTMCNC: 34.1 % — SIGNIFICANT CHANGE UP (ref 32–36)
MCV RBC AUTO: 90.3 FL — SIGNIFICANT CHANGE UP (ref 80–100)
MONOCYTES # BLD AUTO: 0.39 K/UL — SIGNIFICANT CHANGE UP (ref 0–0.9)
MONOCYTES NFR BLD AUTO: 8.2 % — SIGNIFICANT CHANGE UP (ref 2–14)
NEUTROPHILS # BLD AUTO: 2.31 K/UL — SIGNIFICANT CHANGE UP (ref 1.8–7.4)
NEUTROPHILS NFR BLD AUTO: 48.8 % — SIGNIFICANT CHANGE UP (ref 43–77)
NRBC # FLD: 0 — SIGNIFICANT CHANGE UP
PHOSPHATE SERPL-MCNC: 4.4 MG/DL — SIGNIFICANT CHANGE UP (ref 2.5–4.5)
PLATELET # BLD AUTO: 196 K/UL — SIGNIFICANT CHANGE UP (ref 150–400)
PMV BLD: 11.9 FL — SIGNIFICANT CHANGE UP (ref 7–13)
POTASSIUM SERPL-MCNC: 3.4 MMOL/L — LOW (ref 3.5–5.3)
POTASSIUM SERPL-SCNC: 3.4 MMOL/L — LOW (ref 3.5–5.3)
PROT SERPL-MCNC: 6.2 G/DL — SIGNIFICANT CHANGE UP (ref 6–8.3)
RBC # BLD: 3.31 M/UL — LOW (ref 4.2–5.8)
RBC # FLD: 12.6 % — SIGNIFICANT CHANGE UP (ref 10.3–14.5)
SODIUM SERPL-SCNC: 137 MMOL/L — SIGNIFICANT CHANGE UP (ref 135–145)
WBC # BLD: 4.74 K/UL — SIGNIFICANT CHANGE UP (ref 3.8–10.5)
WBC # FLD AUTO: 4.74 K/UL — SIGNIFICANT CHANGE UP (ref 3.8–10.5)

## 2018-11-27 PROCEDURE — 99232 SBSQ HOSP IP/OBS MODERATE 35: CPT | Mod: GC

## 2018-11-27 PROCEDURE — 71045 X-RAY EXAM CHEST 1 VIEW: CPT | Mod: 26

## 2018-11-27 RX ORDER — SODIUM CHLORIDE 9 MG/ML
1000 INJECTION INTRAMUSCULAR; INTRAVENOUS; SUBCUTANEOUS
Qty: 0 | Refills: 0 | Status: DISCONTINUED | OUTPATIENT
Start: 2018-11-27 | End: 2018-11-28

## 2018-11-27 RX ORDER — POTASSIUM CHLORIDE 20 MEQ
40 PACKET (EA) ORAL EVERY 4 HOURS
Qty: 0 | Refills: 0 | Status: DISCONTINUED | OUTPATIENT
Start: 2018-11-27 | End: 2018-11-27

## 2018-11-27 RX ORDER — SODIUM CHLORIDE 9 MG/ML
250 INJECTION INTRAMUSCULAR; INTRAVENOUS; SUBCUTANEOUS ONCE
Qty: 0 | Refills: 0 | Status: COMPLETED | OUTPATIENT
Start: 2018-11-27 | End: 2018-11-27

## 2018-11-27 RX ORDER — POTASSIUM CHLORIDE 20 MEQ
40 PACKET (EA) ORAL EVERY 4 HOURS
Qty: 0 | Refills: 0 | Status: COMPLETED | OUTPATIENT
Start: 2018-11-27 | End: 2018-11-27

## 2018-11-27 RX ADMIN — ATORVASTATIN CALCIUM 20 MILLIGRAM(S): 80 TABLET, FILM COATED ORAL at 21:15

## 2018-11-27 RX ADMIN — Medication 100 MILLIGRAM(S): at 11:55

## 2018-11-27 RX ADMIN — LACOSAMIDE 150 MILLIGRAM(S): 50 TABLET ORAL at 21:15

## 2018-11-27 RX ADMIN — Medication 100 MILLIGRAM(S): at 06:01

## 2018-11-27 RX ADMIN — Medication 100 MILLIGRAM(S): at 22:25

## 2018-11-27 RX ADMIN — SENNA PLUS 2 TABLET(S): 8.6 TABLET ORAL at 21:15

## 2018-11-27 RX ADMIN — PANTOPRAZOLE SODIUM 40 MILLIGRAM(S): 20 TABLET, DELAYED RELEASE ORAL at 06:01

## 2018-11-27 RX ADMIN — Medication 40 MILLIEQUIVALENT(S): at 09:25

## 2018-11-27 RX ADMIN — Medication 40 MILLIEQUIVALENT(S): at 13:41

## 2018-11-27 RX ADMIN — CHLORHEXIDINE GLUCONATE 1 APPLICATION(S): 213 SOLUTION TOPICAL at 06:01

## 2018-11-27 RX ADMIN — CHLORHEXIDINE GLUCONATE 1 APPLICATION(S): 213 SOLUTION TOPICAL at 17:48

## 2018-11-27 RX ADMIN — POLYETHYLENE GLYCOL 3350 17 GRAM(S): 17 POWDER, FOR SOLUTION ORAL at 11:55

## 2018-11-27 RX ADMIN — Medication 200 MILLIGRAM(S): at 21:15

## 2018-11-27 RX ADMIN — Medication 100 MILLIGRAM(S): at 13:41

## 2018-11-27 RX ADMIN — Medication 10 MILLIGRAM(S): at 17:47

## 2018-11-27 RX ADMIN — Medication 10 MILLIGRAM(S): at 05:22

## 2018-11-27 RX ADMIN — Medication 200 MILLIGRAM(S): at 09:25

## 2018-11-27 RX ADMIN — SODIUM CHLORIDE 50 MILLILITER(S): 9 INJECTION INTRAMUSCULAR; INTRAVENOUS; SUBCUTANEOUS at 15:54

## 2018-11-27 RX ADMIN — SODIUM CHLORIDE 1000 MILLILITER(S): 9 INJECTION INTRAMUSCULAR; INTRAVENOUS; SUBCUTANEOUS at 15:30

## 2018-11-27 RX ADMIN — ARIPIPRAZOLE 5 MILLIGRAM(S): 15 TABLET ORAL at 11:55

## 2018-11-27 RX ADMIN — Medication 200 MILLIGRAM(S): at 21:16

## 2018-11-27 RX ADMIN — SODIUM CHLORIDE 100 MILLILITER(S): 9 INJECTION INTRAMUSCULAR; INTRAVENOUS; SUBCUTANEOUS at 06:02

## 2018-11-27 RX ADMIN — LACOSAMIDE 100 MILLIGRAM(S): 50 TABLET ORAL at 06:01

## 2018-11-27 NOTE — DISCHARGE NOTE ADULT - CARE PROVIDER_API CALL
Joe Walsh), Clinical Neurophysiology; EEGEpilepsy; Neurology  [address]  [place] [address]  Phone: [phone]  Fax: [phone]    Marito Ramos), Cardiovascular Disease; Internal Medicine  [address] [place] [address]  Phone: [phone]  Fax: [phone]

## 2018-11-27 NOTE — DISCHARGE NOTE ADULT - ADDITIONAL INSTRUCTIONS
Please follow up with your primary care doctor within one week to discuss your recent hospitalization.   Please continue to see neurologist Dr. Walsh to manage your anti-epileptic medication regimen. Please follow up with your primary care doctor within one week to discuss your recent hospitalization, and to schedule blood work to check a metabolic panel as your liver function test (LFT's, AST/ALT) was elevated in the hospital but came down before discharge.  Please continue to see neurologist Dr. Walsh to manage your anti-epileptic medication regimen.

## 2018-11-27 NOTE — DISCHARGE NOTE ADULT - CONDITIONS AT DISCHARGE
This Patient is stable for discharge , alert and oriented x 2-3 , he is able to make his need known, partial to total assistance for his care. Aspiration and Seizure precautions are taken with him; he is incontinent even though the use of the Urinal is encouraged; His Skin remains intact; Right Arm is contarcated and the Midline he came with will be removed.  he has no new complaints of discomfort at this time This Patient is stable for discharge , alert and oriented x 2-3 , he is able to make his need known, partial to total assistance for his care. Aspiration and Seizure precautions are taken with him; he is incontinent even though the use of the Urinal is encouraged; His Skin remains intact; Right Arm is contracted and the Midline he came with will be removed.  He has no new complaints of discomfort at this time.  He will be discharged after the Last dose of the Antibiotic is given today.

## 2018-11-27 NOTE — DISCHARGE NOTE ADULT - PATIENT PORTAL LINK FT
You can access the [facility] Patient Portal, offered by [facility], by registering with the following website: [email or web address]

## 2018-11-27 NOTE — DISCHARGE NOTE ADULT - CONDITION (STATED IN TERMS THAT PERMIT A SPECIFIC MEASURABLE COMPARISON WITH CONDITION ON ADMISSION):
Patient is hemodynamically and medically stable for discharge to home. Patient is hemodynamically and medically stable for discharge to home with home PT.
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
## 2018-11-27 NOTE — DISCHARGE NOTE ADULT - OTHER SIGNIFICANT FINDINGS
CT Abdomen and Pelvis w/ IV Cont (11.21.18 @ 19:19)  IMPRESSION: Wall thickening of the rectum and sigmoid colon with trace   presacral fluid, suggestive of proctocolitis. Correlate clinically.    US Abdomen Limited (11.21.18 @ 20:19)  IMPRESSION:   Gallbladder is mildly distended however there is no wall thickening.   There is trace pericholecystic fluid and perihepatic fluid. There is   gallbladder sludge. No stones are seen. There was no sonographic Huynh   sign. Findings are equivocal for acute cholecystitis. Correlate   clinically.    NM Hepatobiliary Scan w/wo Gall Bladder (11.23.18 @ 13:19)  IMPRESSION: Normal hepatobiliary scan.  No radionuclide evidence of acute cholecystitis.

## 2018-11-27 NOTE — DISCHARGE NOTE ADULT - PLAN OF CARE
Resolution of infection You came to the hospital with a fever and other signs of infection. A CT scan of your abdomen showed that you likely had a condition called proctocolitis, or inflammation of your colon. You were started on antibiotics Ciprofloxacin and Metronidazole to address this infection. Please follow up with your primary care doctor within one week of discharge to discuss your recent hospitalization. If you experience recurrence of your symptoms, including fever or prolonged constipation, please seek urgent medical attention. Compliance with anti-epileptic medications You have a history of seizures for which you take Vimpat 100mg in the morning and 150mg at night, as well as Topiramate 200mg two times per day. Please continue to take these medications and continue to follow up with your neurologist Dr. Walsh. If you experience alterations in your level of consciousness (very sleepy or confused) or if you have a seizure please seek urgent medical attention.

## 2018-11-27 NOTE — DISCHARGE NOTE ADULT - INSTRUCTIONS
You may resume your regular diet when you return home. Please supplement your diet daily as needed with Ensure Enlive or other nutritional supplement shakes. You may resume your  Dysphagia 2 with Nectar consistency for all liquids, diet when you return home. Please supplement your diet daily as needed with Ensure Enlive or other nutritional supplement shakes.

## 2018-11-27 NOTE — DISCHARGE NOTE ADULT - CARE PLAN
Principal Discharge DX:	Proctocolitis  Goal:	Resolution of infection  Assessment and plan of treatment:	You came to the hospital with a fever and other signs of infection. A CT scan of your abdomen showed that you likely had a condition called proctocolitis, or inflammation of your colon. You were started on antibiotics Ciprofloxacin and Metronidazole to address this infection. Please follow up with your primary care doctor within one week of discharge to discuss your recent hospitalization. If you experience recurrence of your symptoms, including fever or prolonged constipation, please seek urgent medical attention.  Secondary Diagnosis:	Seizure  Goal:	Compliance with anti-epileptic medications  Assessment and plan of treatment:	You have a history of seizures for which you take Vimpat 100mg in the morning and 150mg at night, as well as Topiramate 200mg two times per day. Please continue to take these medications and continue to follow up with your neurologist Dr. Walsh. If you experience alterations in your level of consciousness (very sleepy or confused) or if you have a seizure please seek urgent medical attention.

## 2018-11-27 NOTE — DISCHARGE NOTE ADULT - CARE PROVIDERS DIRECT ADDRESSES
[email or web address]

## 2018-11-27 NOTE — PROVIDER CONTACT NOTE (OTHER) - SITUATION
Patient rang bell to call nurse in and stated he had a seizure.  No seizure activity noted.  Patient states his legs were twitching.

## 2018-11-27 NOTE — PROVIDER CONTACT NOTE (OTHER) - ACTION/TREATMENT ORDERED:
normal saline bolus given.  remains on IV hydration
will continue to monitor
Bp rechecked it was 100/60mmhg , and notified MD.
No orderers made, will continue the ivf as ordered.

## 2018-11-27 NOTE — DISCHARGE NOTE ADULT - MEDICATION SUMMARY - MEDICATIONS TO TAKE
I will START or STAY ON the medications listed below when I get home from the hospital:    topiramate 200 mg oral tablet  -- 1 tab(s) by mouth every 12 hours  -- Indication: For Seizure    lacosamide 100 mg oral tablet  -- 1 tab(s) by mouth once a day in the morning MDD:300mg  -- Indication: For Seizure    lacosamide 150 mg oral tablet  -- 1 tab(s) by mouth once a day (at bedtime) MDD:300mg  -- Indication: For Seizure    atorvastatin 20 mg oral tablet  -- 1 tab(s) by mouth once a day (at bedtime)  -- Indication: For Hyperlipidemia    Abilify 5 mg oral tablet  -- 1 tab(s) by mouth once a day  -- Indication: For TBI    senna oral tablet  -- 2 tab(s) by mouth once a day (at bedtime) for constipation  -- Indication: For Constipation    docusate sodium 100 mg oral capsule  -- 1 cap(s) by mouth once a day for constipation  -- Indication: For Constipation    polyethylene glycol 3350 oral powder for reconstitution  -- 17 gram(s) by mouth once a day, As Needed -for constipation   -- Indication: For Constipation    potassium chloride 20 mEq oral powder for reconstitution  -- 1 each by mouth 2 times a day  -- Indication: For Nutritional supplement    baclofen 10 mg oral tablet  -- 1 tab(s) by mouth 2 times a day  -- Indication: For muscle contracture    omeprazole 20 mg oral delayed release capsule  -- 1 cap(s) by mouth once a day  -- Indication: For reflux

## 2018-11-28 VITALS
TEMPERATURE: 98 F | HEART RATE: 59 BPM | RESPIRATION RATE: 18 BRPM | SYSTOLIC BLOOD PRESSURE: 110 MMHG | DIASTOLIC BLOOD PRESSURE: 61 MMHG | OXYGEN SATURATION: 100 %

## 2018-11-28 LAB
ALBUMIN SERPL ELPH-MCNC: 2.8 G/DL — LOW (ref 3.3–5)
ALP SERPL-CCNC: 98 U/L — SIGNIFICANT CHANGE UP (ref 40–120)
ALT FLD-CCNC: 48 U/L — HIGH (ref 4–41)
AST SERPL-CCNC: 47 U/L — HIGH (ref 4–40)
BILIRUB SERPL-MCNC: 0.3 MG/DL — SIGNIFICANT CHANGE UP (ref 0.2–1.2)
BUN SERPL-MCNC: 11 MG/DL — SIGNIFICANT CHANGE UP (ref 7–23)
CALCIUM SERPL-MCNC: 8.5 MG/DL — SIGNIFICANT CHANGE UP (ref 8.4–10.5)
CHLORIDE SERPL-SCNC: 109 MMOL/L — HIGH (ref 98–107)
CO2 SERPL-SCNC: 16 MMOL/L — LOW (ref 22–31)
CREAT SERPL-MCNC: 0.57 MG/DL — SIGNIFICANT CHANGE UP (ref 0.5–1.3)
GLUCOSE SERPL-MCNC: 104 MG/DL — HIGH (ref 70–99)
HCT VFR BLD CALC: 34 % — LOW (ref 39–50)
HGB BLD-MCNC: 11.3 G/DL — LOW (ref 13–17)
MAGNESIUM SERPL-MCNC: 2 MG/DL — SIGNIFICANT CHANGE UP (ref 1.6–2.6)
MCHC RBC-ENTMCNC: 31.3 PG — SIGNIFICANT CHANGE UP (ref 27–34)
MCHC RBC-ENTMCNC: 33.2 % — SIGNIFICANT CHANGE UP (ref 32–36)
MCV RBC AUTO: 94.2 FL — SIGNIFICANT CHANGE UP (ref 80–100)
NRBC # FLD: 0 — SIGNIFICANT CHANGE UP
PHOSPHATE SERPL-MCNC: 3.9 MG/DL — SIGNIFICANT CHANGE UP (ref 2.5–4.5)
PLATELET # BLD AUTO: 242 K/UL — SIGNIFICANT CHANGE UP (ref 150–400)
PMV BLD: 11.4 FL — SIGNIFICANT CHANGE UP (ref 7–13)
POTASSIUM SERPL-MCNC: 3.8 MMOL/L — SIGNIFICANT CHANGE UP (ref 3.5–5.3)
POTASSIUM SERPL-SCNC: 3.8 MMOL/L — SIGNIFICANT CHANGE UP (ref 3.5–5.3)
PROT SERPL-MCNC: 6.7 G/DL — SIGNIFICANT CHANGE UP (ref 6–8.3)
RBC # BLD: 3.61 M/UL — LOW (ref 4.2–5.8)
RBC # FLD: 12.6 % — SIGNIFICANT CHANGE UP (ref 10.3–14.5)
SODIUM SERPL-SCNC: 135 MMOL/L — SIGNIFICANT CHANGE UP (ref 135–145)
WBC # BLD: 5.5 K/UL — SIGNIFICANT CHANGE UP (ref 3.8–10.5)
WBC # FLD AUTO: 5.5 K/UL — SIGNIFICANT CHANGE UP (ref 3.8–10.5)

## 2018-11-28 PROCEDURE — 99239 HOSP IP/OBS DSCHRG MGMT >30: CPT

## 2018-11-28 RX ORDER — POLYETHYLENE GLYCOL 3350 17 G/17G
17 POWDER, FOR SOLUTION ORAL
Qty: 30 | Refills: 0 | OUTPATIENT
Start: 2018-11-28 | End: 2018-12-27

## 2018-11-28 RX ORDER — LACOSAMIDE 50 MG/1
1 TABLET ORAL
Qty: 30 | Refills: 0 | OUTPATIENT
Start: 2018-11-28 | End: 2018-12-27

## 2018-11-28 RX ORDER — ARIPIPRAZOLE 15 MG/1
1 TABLET ORAL
Qty: 0 | Refills: 0 | COMMUNITY

## 2018-11-28 RX ORDER — ARIPIPRAZOLE 15 MG/1
1 TABLET ORAL
Qty: 30 | Refills: 0 | OUTPATIENT
Start: 2018-11-28 | End: 2018-12-27

## 2018-11-28 RX ORDER — DOCUSATE SODIUM 100 MG
1 CAPSULE ORAL
Qty: 30 | Refills: 0 | OUTPATIENT
Start: 2018-11-28 | End: 2018-12-27

## 2018-11-28 RX ORDER — BACLOFEN 100 %
1 POWDER (GRAM) MISCELLANEOUS
Qty: 0 | Refills: 0 | COMMUNITY

## 2018-11-28 RX ORDER — ATORVASTATIN CALCIUM 80 MG/1
1 TABLET, FILM COATED ORAL
Qty: 30 | Refills: 0 | OUTPATIENT
Start: 2018-11-28 | End: 2018-12-27

## 2018-11-28 RX ORDER — OMEPRAZOLE 10 MG/1
1 CAPSULE, DELAYED RELEASE ORAL
Qty: 0 | Refills: 0 | COMMUNITY

## 2018-11-28 RX ORDER — SENNA PLUS 8.6 MG/1
2 TABLET ORAL
Qty: 60 | Refills: 0 | OUTPATIENT
Start: 2018-11-28 | End: 2018-12-27

## 2018-11-28 RX ORDER — OMEPRAZOLE 10 MG/1
1 CAPSULE, DELAYED RELEASE ORAL
Qty: 30 | Refills: 0 | OUTPATIENT
Start: 2018-11-28 | End: 2018-12-27

## 2018-11-28 RX ORDER — BACLOFEN 100 %
1 POWDER (GRAM) MISCELLANEOUS
Qty: 60 | Refills: 0 | OUTPATIENT
Start: 2018-11-28 | End: 2018-12-27

## 2018-11-28 RX ORDER — TOPIRAMATE 25 MG
1 TABLET ORAL
Qty: 60 | Refills: 0 | OUTPATIENT
Start: 2018-11-28 | End: 2018-12-27

## 2018-11-28 RX ADMIN — POLYETHYLENE GLYCOL 3350 17 GRAM(S): 17 POWDER, FOR SOLUTION ORAL at 11:39

## 2018-11-28 RX ADMIN — LACOSAMIDE 100 MILLIGRAM(S): 50 TABLET ORAL at 05:47

## 2018-11-28 RX ADMIN — Medication 100 MILLIGRAM(S): at 13:50

## 2018-11-28 RX ADMIN — SODIUM CHLORIDE 50 MILLILITER(S): 9 INJECTION INTRAMUSCULAR; INTRAVENOUS; SUBCUTANEOUS at 13:51

## 2018-11-28 RX ADMIN — Medication 200 MILLIGRAM(S): at 09:12

## 2018-11-28 RX ADMIN — Medication 100 MILLIGRAM(S): at 05:48

## 2018-11-28 RX ADMIN — ARIPIPRAZOLE 5 MILLIGRAM(S): 15 TABLET ORAL at 11:39

## 2018-11-28 RX ADMIN — SODIUM CHLORIDE 50 MILLILITER(S): 9 INJECTION INTRAMUSCULAR; INTRAVENOUS; SUBCUTANEOUS at 05:48

## 2018-11-28 RX ADMIN — CHLORHEXIDINE GLUCONATE 1 APPLICATION(S): 213 SOLUTION TOPICAL at 05:48

## 2018-11-28 RX ADMIN — Medication 10 MILLIGRAM(S): at 05:48

## 2018-11-28 RX ADMIN — PANTOPRAZOLE SODIUM 40 MILLIGRAM(S): 20 TABLET, DELAYED RELEASE ORAL at 06:24

## 2018-11-28 RX ADMIN — Medication 100 MILLIGRAM(S): at 11:39

## 2018-11-28 NOTE — PROGRESS NOTE ADULT - PROBLEM SELECTOR PLAN 4
- UA positive with large blood, neg bacteria, moderate LE, and javier placed by urology in ED, javier removed 11/25, passed TOV  - Urine culture showing 2 GNR, likely contaminant  - Continue to monitor for now

## 2018-11-28 NOTE — PHYSICAL THERAPY INITIAL EVALUATION ADULT - ADDITIONAL COMMENTS
Pt. is minimally verbal and did not participate much in PT interview.  Per chart review, pt. lives with his parents.  However, prior level of function is unclear, as information is documented inconsistently.  Patient is noted to be ambulatory with a cane or walker at baseline, but pt. is documented elsewhere as being non-ambulatory and wheelchair-bound.     Pt. left in bed post-PT in NAD with all lines/tubes intact & call bell within reach.  REECE tucker.

## 2018-11-28 NOTE — PROGRESS NOTE ADULT - PROBLEM SELECTOR PLAN 1
Resolved  - Patient presented with fever and hypotension likely secondary to proctocolitis vs UTI  - UA mildly positive with urine culture having multiple GNR likely contaminant  - BCx 11/21 NGTD    - CT A/P showing proctocolitis  - Continue with  Cipro 400 IV Q12 and Metronidazole 500 IV Q8 (11/22-), plan for total of 7 days
Recorded fever of T 101.9 at rehab w/ hypotension BP 89/54, found to be hypotensive in ED, bolused 2L IVF  - CT abd w/ proctocolitis  - UA positive w/ suprapubic tenderness.   - f/u UCx, BCx  - c/w IVF NS at 100cc/hr  - c/w cipro 400 IV Q12 and Flagyl/Metronidazole 500 IV Q8  - HIDA scan to r/o cholecystitis (see below)
Recorded fever of T 101.9 at rehab w/ hypotension BP 89/54, found to be hypotensive in ED, bolused 2L IVF  - CT abd w/ proctocolitis  - UA positive w/ suprapubic tenderness.   - f/u UCx  - BCx with no growth at 24hr  - c/w IVF NS at 100cc/hr  - c/w cipro 400 IV Q12 and Flagyl/Metronidazole 500 IV Q8; can escalate to meropenem if pt becomes febrile/hemodynamically unstable  - HIDA scan today to r/o cholecystitis (see below)
Resolved  - Patient presented with fever and hypotension likely secondary to proctocolitis vs UTI  - UA mildly positive with urine culture having multiple GNR likely contaminant  - BCx 11/21 NGTD    - CT A/P showing proctocolitis  - Continue with  Cipro 400 IV Q12 and Metronidazole 500 IV Q8 (11/22-), plan for total of 7 days
Resolved  - Patient presented with fever and hypotension likely secondary to proctocolitis vs UTI  - UA mildly positive with urine culture having multiple GNR likely contaminant  - BCx 11/21 NGTD    - CT A/P showing proctocolitis  - Continue with  Cipro 400 IV Q12 and Metronidazole 500 IV Q8 (11/22-), plan for total of 7 days (today is day 5)
Resolved  - Patient presented with fever and hypotension likely secondary to proctocolitis vs UTI vs Cholecystitis  - UA mildly positive with urine culture having multiple GNR likely contaminant  - BCx 11/21 NGTD    - CT A/P showing proctocolitis  - Continue with  Cipro 400 IV Q12 and Metronidazole 500 IV Q8 (11/22-), consider change to PO, plan for total of 7 days (today is day 6)
Resolved  - Patient presented with fever and hypotension likely secondary to proctocolitis vs UTI vs Cholecystitis  - UA mildly positive with urine culture having multiple GNR likely contaminant  - BCx 11/21 NGTD    - CT A/P showing proctocolitis  - s/p 7 day course Cipro 400 IV Q12 and Metronidazole 500 IV Q8 (11/22-28)

## 2018-11-28 NOTE — PHYSICAL THERAPY INITIAL EVALUATION ADULT - RANGE OF MOTION EXAMINATION, REHAB EVAL
deficits as listed below/bilateral lower extremity ROM was WFL (within functional limits)/bilateral upper extremity ROM was WFL (within functional limits)/(not formally assessed --> thought (+) Right hand contracture noted)

## 2018-11-28 NOTE — PROGRESS NOTE ADULT - PROBLEM SELECTOR PROBLEM 2
R/O Cholecystitis

## 2018-11-28 NOTE — PHYSICAL THERAPY INITIAL EVALUATION ADULT - PATIENT PROFILE REVIEW, REHAB EVAL
yes/ACTIVITY: out of bed with assistance; consult with Oliver Jacinto RN --> pt. OK for PT as tolerated

## 2018-11-28 NOTE — PHYSICAL THERAPY INITIAL EVALUATION ADULT - DISCHARGE DISPOSITION, PT EVAL
prior level of function must be determined; if pt. is wheelchair-bound at baseline, home PT is recommended, but if pt. is ambulatory at baseline, then he would likely benefit from returning to inpatient restorative rehab --> however, per JUAN ANTONIO and RN manager, pt.'s family will be taking pt. home, as they do not wish for pt. to return to rehab

## 2018-11-28 NOTE — PROGRESS NOTE ADULT - PROBLEM SELECTOR PLAN 7
- DVT prophylaxis: SCD's, IMPROVE score 1  - Diet: halal regular diet  - Dispo: Family states they do not want patient to return to rehab, would like to get HHA in place for return to home, CM garrett Washington PGY-2  Internal medicine [facility]  Pager [phone]
- DVT prophylaxis: SCD's, IMPROVE score 1  - Diet: halal regular diet  - Dispo: Family states they do not want patient to return to rehab, would like to get A in place for return to home, CM aware    Manpreet Graves, PGY1  Spectra [phone], Pager [phone]
- DVT prophylaxis: SCD's, IMPROVE score 1  - Diet: halal regular diet  - Dispo: Family states they do not want patient to return to rehab, would like to get HHA in place for return to home, CM aware
- DVT prophylaxis: SCD's, IMPROVE score 1  - Diet: halal regular diet, thickened liquids  - Dispo: Family states they do not want patient to return to rehab, would like to get HHA in place for return to home, CM aware
- DVT prophylaxis: SCD's, IMPROVE score 1  - Diet: halal regular diet, thickened liquids  - Dispo: Family states they do not want patient to return to rehab, would like to get A in place for return to home

## 2018-11-28 NOTE — PROGRESS NOTE ADULT - NSHPATTENDINGPLANDISCUSS_GEN_ALL_CORE
PT, RN, SW/CM, resident.
Pt, RN, resident
Pt,  and house staff
Pt,  and house staff
Pt, RN, resident
Pt, RN, resident
Pt,  and house staff

## 2018-11-28 NOTE — PROGRESS NOTE ADULT - PROBLEM SELECTOR PLAN 6
- Continue with home meds Lacosamide 100 QAM/ 150 QHS and Topiramate 200 Q12h  - Continue with seizure/aspiration precautions
DVT ppx: SCD's, IMPROVE score 1  Diet: halal regular diet  PT consult  Dispo: Family states they do not want pt to return to rehab, would like to get HHA in place for return to home
- Continue with home meds Lacosamide 100 QAM/ 150 QHS and Topiramate 200 Q12h  - Continue with seizure/aspiration precautions
DVT ppx: SCD's, IMPROVE score 1  Diet: halal regular diet  PT consult  Dispo: Family states they do not want pt to return to rehab, would like to get HHA in place for return to home
- Continue with home meds Lacosamide 100 QAM/ 150 QHS and Topiramate 200 Q12h  - Continue with seizure/aspiration precautions
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
## 2018-11-28 NOTE — PROGRESS NOTE ADULT - PROVIDER SPECIALTY LIST ADULT
Internal Medicine
Surgery
Internal Medicine
Internal Medicine
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
## 2018-11-28 NOTE — PROGRESS NOTE ADULT - PROBLEM SELECTOR PROBLEM 5
Elevated liver enzymes
Seizure
Elevated liver enzymes
Seizure
Elevated liver enzymes

## 2018-11-28 NOTE — PROGRESS NOTE ADULT - SUBJECTIVE AND OBJECTIVE BOX
Dr. Lidia Rutledge, PGY1  Pager [phone]    TASIA WARE  37y  MRN: [id number]    Subjective:  Patient is a 37y old  Male who presents with a chief complaint of fever (27 Nov 2018 10:55)      Overnight:   Patient endorsed shaking in the right leg (contracted, unable to move on command at baseline), which     MEDICATIONS  (STANDING):  ARIPiprazole 5 milliGRAM(s) Oral daily  atorvastatin 20 milliGRAM(s) Oral at bedtime  baclofen 10 milliGRAM(s) Oral two times a day  chlorhexidine 4% Liquid 1 Application(s) Topical two times a day  ciprofloxacin   IVPB 400 milliGRAM(s) IV Intermittent every 12 hours  docusate sodium 100 milliGRAM(s) Oral daily  lacosamide 100 milliGRAM(s) Oral <User Schedule>  lacosamide 150 milliGRAM(s) Oral <User Schedule>  metroNIDAZOLE  IVPB 500 milliGRAM(s) IV Intermittent every 8 hours  pantoprazole    Tablet 40 milliGRAM(s) Oral before breakfast  polyethylene glycol 3350 17 Gram(s) Oral daily  senna 2 Tablet(s) Oral at bedtime  sodium chloride 0.9%. 1000 milliLiter(s) (50 mL/Hr) IV Continuous <Continuous>  topiramate 200 milliGRAM(s) Oral every 12 hours    MEDICATIONS  (PRN):  acetaminophen   Tablet .. 650 milliGRAM(s) Oral every 6 hours PRN Temp greater or equal to 38C (100.4F), Mild Pain (1 - 3)        Objective:  Vitals: Vital Signs Last 24 Hrs  T(C): 36.9 (11-28-18 @ 10:30), Max: 37.3 (11-27-18 @ 14:37)  T(F): 98.4 (11-28-18 @ 10:30), Max: 99.1 (11-27-18 @ 14:37)  HR: 65 (11-28-18 @ 10:30) (65 - 71)  BP: 107/58 (11-28-18 @ 10:30) (86/44 - 107/58)  BP(mean): --  RR: 18 (11-28-18 @ 10:30) (18 - 18)  SpO2: 100% (11-28-18 @ 10:30) (98% - 100%)                I&O's Summary    27 Nov 2018 07:01  -  28 Nov 2018 07:00  --------------------------------------------------------  IN: 900 mL / OUT: 0 mL / NET: 900 mL        PHYSICAL EXAM:  Constitutional: resting, well-developed, poorly nourished  ENMT: normal external ears and nose, normal hearing, dry oral mucosa  Eyes: normal conjunctiva, EOMI, PERRL  Neck: supple, no JVD  Respiratory: Clear to auscultation bilaterally. No wheezes, rales or rhonchi. Normal respiratory effort  Cardiovascular: RRR, no M/R/G, no edema, 2+ Peripheral Pulses  Abdomen: soft, nontender, no rigidity/guarding/rebound, +BS  Skin: warm, dry, no rashes or lesions  Neurologic: sensation grossly intact, CN grossly intact, +RUE contracture and weakness, can wiggle left foot but not right foot  Musculoskeletal: no clubbing, no cyanosis, no joint swelling  Psychiatric: A&O x1-2, no agitation, anxiety                                         LABS:  11-28    135  |  109<H>  |  11  ----------------------------<  104<H>  3.8   |  16<L>  |  0.57  11-27    137  |  111<H>  |  8   ----------------------------<  94  3.4<L>   |  16<L>  |  0.63  11-26    138  |  112<H>  |  7   ----------------------------<  99  3.2<L>   |  17<L>  |  0.64    Ca    8.5      28 Nov 2018 06:00  Ca    8.3<L>      27 Nov 2018 05:25  Ca    8.3<L>      26 Nov 2018 04:50  Phos  3.9     11-28  Mg     2.0     11-28    TPro  6.7  /  Alb  2.8<L>  /  TBili  0.3  /  DBili  x   /  AST  47<H>  /  ALT  48<H>  /  AlkPhos  98  11-28  TPro  6.2  /  Alb  2.6<L>  /  TBili  0.3  /  DBili  x   /  AST  54<H>  /  ALT  50<H>  /  AlkPhos  93  11-27  TPro  6.0  /  Alb  2.6<L>  /  TBili  0.3  /  DBili  x   /  AST  54<H>  /  ALT  53<H>  /  AlkPhos  100  11-26                                     11.3   5.50  )-----------( 242      ( 28 Nov 2018 06:00 )             34.0                         10.2   4.74  )-----------( 196      ( 27 Nov 2018 05:25 )             29.9                         9.7    4.80  )-----------( 195      ( 26 Nov 2018 04:50 )             28.1         RADIOLOGY & ADDITIONAL TESTS:  Xray Chest 1 View- PORTABLE-Urgent (11.27.18 @ 14:56)  An intravascular location of the midline catheter is not able to be   confirmed on this image.      Imaging Personally Reviewed:  [ x] YES  [ ] NO      Consultants involved in case:   PT to evaluate pt today prior to discharge    Consultant(s) Notes Reviewed:  [ ] YES  [ ] NO:   Care Discussed with Consultants/Other Providers [ ] YES  [ ] Dr. Lidia Rutledge, PGY1  Pager [phone]    TASIA WARE  37y  MRN: [id number]    Subjective:  Patient is a 37y old  Male who presents with a chief complaint of fever (27 Nov 2018 10:55)      Overnight:   Patient endorsed shaking in the right leg (contracted, unable to move on command at baseline), which patient said "seizure" but it does not appear to be seizure-like activity as the shaking was not witnessed, isolated to the right lower leg, and patient experienced no alterations in consciousness. Patient denies pain, HA, cp/sob, n/v/d.        MEDICATIONS  (STANDING):  ARIPiprazole 5 milliGRAM(s) Oral daily  atorvastatin 20 milliGRAM(s) Oral at bedtime  baclofen 10 milliGRAM(s) Oral two times a day  chlorhexidine 4% Liquid 1 Application(s) Topical two times a day  ciprofloxacin   IVPB 400 milliGRAM(s) IV Intermittent every 12 hours  docusate sodium 100 milliGRAM(s) Oral daily  lacosamide 100 milliGRAM(s) Oral <User Schedule>  lacosamide 150 milliGRAM(s) Oral <User Schedule>  metroNIDAZOLE  IVPB 500 milliGRAM(s) IV Intermittent every 8 hours  pantoprazole    Tablet 40 milliGRAM(s) Oral before breakfast  polyethylene glycol 3350 17 Gram(s) Oral daily  senna 2 Tablet(s) Oral at bedtime  sodium chloride 0.9%. 1000 milliLiter(s) (50 mL/Hr) IV Continuous <Continuous>  topiramate 200 milliGRAM(s) Oral every 12 hours    MEDICATIONS  (PRN):  acetaminophen   Tablet .. 650 milliGRAM(s) Oral every 6 hours PRN Temp greater or equal to 38C (100.4F), Mild Pain (1 - 3)        Objective:  Vitals: Vital Signs Last 24 Hrs  T(C): 36.9 (11-28-18 @ 10:30), Max: 37.3 (11-27-18 @ 14:37)  T(F): 98.4 (11-28-18 @ 10:30), Max: 99.1 (11-27-18 @ 14:37)  HR: 65 (11-28-18 @ 10:30) (65 - 71)  BP: 107/58 (11-28-18 @ 10:30) (86/44 - 107/58)  BP(mean): --  RR: 18 (11-28-18 @ 10:30) (18 - 18)  SpO2: 100% (11-28-18 @ 10:30) (98% - 100%)                I&O's Summary    27 Nov 2018 07:01  -  28 Nov 2018 07:00  --------------------------------------------------------  IN: 900 mL / OUT: 0 mL / NET: 900 mL        PHYSICAL EXAM:  Constitutional: resting, well-developed, poorly nourished  ENMT: normal external ears and nose, normal hearing, dry oral mucosa  Eyes: normal conjunctiva, EOMI, PERRL  Neck: supple, no JVD  Respiratory: Clear to auscultation bilaterally. No wheezes, rales or rhonchi. Normal respiratory effort  Cardiovascular: RRR, no M/R/G, no edema, 2+ Peripheral Pulses  Abdomen: soft, nontender, no rigidity/guarding/rebound, +BS  Skin: warm, dry, no rashes or lesions  Neurologic: sensation grossly intact, CN grossly intact, +RUE contracture and weakness, can wiggle left foot but not right foot  Musculoskeletal: no clubbing, no cyanosis, no joint swelling  Psychiatric: A&O x1-2, no agitation, anxiety                                         LABS:  11-28    135  |  109<H>  |  11  ----------------------------<  104<H>  3.8   |  16<L>  |  0.57  11-27    137  |  111<H>  |  8   ----------------------------<  94  3.4<L>   |  16<L>  |  0.63  11-26    138  |  112<H>  |  7   ----------------------------<  99  3.2<L>   |  17<L>  |  0.64    Ca    8.5      28 Nov 2018 06:00  Ca    8.3<L>      27 Nov 2018 05:25  Ca    8.3<L>      26 Nov 2018 04:50  Phos  3.9     11-28  Mg     2.0     11-28    TPro  6.7  /  Alb  2.8<L>  /  TBili  0.3  /  DBili  x   /  AST  47<H>  /  ALT  48<H>  /  AlkPhos  98  11-28  TPro  6.2  /  Alb  2.6<L>  /  TBili  0.3  /  DBili  x   /  AST  54<H>  /  ALT  50<H>  /  AlkPhos  93  11-27  TPro  6.0  /  Alb  2.6<L>  /  TBili  0.3  /  DBili  x   /  AST  54<H>  /  ALT  53<H>  /  AlkPhos  100  11-26                                     11.3   5.50  )-----------( 242      ( 28 Nov 2018 06:00 )             34.0                         10.2   4.74  )-----------( 196      ( 27 Nov 2018 05:25 )             29.9                         9.7    4.80  )-----------( 195      ( 26 Nov 2018 04:50 )             28.1         RADIOLOGY & ADDITIONAL TESTS:  Xray Chest 1 View- PORTABLE-Urgent (11.27.18 @ 14:56)  An intravascular location of the midline catheter is not able to be   confirmed on this image.      Imaging Personally Reviewed:  [ x] YES  [ ] NO      Consultants involved in case:   PT to evaluate pt today prior to discharge    Consultant(s) Notes Reviewed:  [ ] YES  [ ] NO:   Care Discussed with Consultants/Other Providers [ ] YES  [ ]

## 2018-11-28 NOTE — PROGRESS NOTE ADULT - PROBLEM SELECTOR PLAN 5
- Mild elevation in LFT with normal ALK and bili now downtrending  - Likely secondary to sepsis  - Acute cholecystis ruled out via HIDA  - Continue to monitor CMP
c/w home meds Lacosamide 100 QAM/ 150 QHS, Topiramate 200 Q12h
- Mild elevation in LFT with normal ALK and bili now downtrending  - Likely secondary to sepsis  - Acute cholecystis ruled out via HIDA  - Continue to monitor CMP
c/w home meds Lacosamide 100 QAM/ 150 QHS, Topiramate 200 Q12h
- Mild elevation in LFT with normal ALK and bili now downtrending  - Likely secondary to sepsis  - Acute cholecystis ruled out via HIDA  - Continue to monitor CMP

## 2018-11-28 NOTE — PROGRESS NOTE ADULT - PROBLEM SELECTOR PLAN 3
- CT Abdomen with wall thickening of the rectum and sigmoid colon with trace   presacral fluid, suggestive of proctocolitis  - Continue with Ciprofloxacin 400 IV Q12 and Metronidazole 500 IV Q8 (11/22-), planning for total of 7 day course  - Continue with bowel regimen colace/ senna/ miralax
CT Abdomen with wall thickening of the rectum and sigmoid colon with trace   presacral fluid, suggestive of proctocolitis  - c/w Ciprofloxacin 400 IV Q12 and Flagyl/Metronidazole 500 IV Q8  - bowel regimen colace/ senna/ miralax
- CT Abdomen with wall thickening of the rectum and sigmoid colon with trace   presacral fluid, suggestive of proctocolitis  - Continue with Ciprofloxacin 400 IV Q12 and Metronidazole 500 IV Q8 (11/22-), planning for total of 7 day course  - Continue with bowel regimen colace/ senna/ miralax
CT Abdomen with wall thickening of the rectum and sigmoid colon with trace   presacral fluid, suggestive of proctocolitis  - c/w Ciprofloxacin 400 IV Q12 and Flagyl/Metronidazole 500 IV Q8  - bowel regimen colace/ senna/ miralax
- CT Abdomen with wall thickening of the rectum and sigmoid colon with trace   presacral fluid, suggestive of proctocolitis  - s/p 7d course Ciprofloxacin 400 IV Q12 and Metronidazole 500 IV Q8 (11/22-28)  - Continue with bowel regimen colace/ senna/ miralax

## 2018-11-28 NOTE — PROGRESS NOTE ADULT - PROBLEM SELECTOR PLAN 2
RUQ tender on exam now resolved  - U/S Abdomen with mildly distended gallbladder however there is no wall thickening with trace pericholecystic fluid and perihepatic fluid and gallbladder sludge. No stones are seen   - HIDA scan negative for acute cholecystitis  - LFT's trending down, continue to monitor CMP
RUQ tender on exam and U/S Abdomen with mildly distended gallbladder however there is no wall thickening. There is trace pericholecystic fluid and perihepatic fluid. There is gallbladder sludge. No stones are seen. Findings are equivocal for acute cholecystitis.  - LFT's slightly up from yesterday (AST//80, yesterday 113/68)  - order HIDA scan to r/o acute cholecystitis
RUQ tender on exam and U/S Abdomen with mildly distended gallbladder however there is no wall thickening. There is trace pericholecystic fluid and perihepatic fluid. There is gallbladder sludge. No stones are seen. Findings are equivocal for acute cholecystitis.  - LFT's trending down, 78 / 68 today, from yesterday AST//80  - HIDA scan today to r/o acute cholecystitis
RUQ tender on exam now resolved  - U/S Abdomen with mildly distended gallbladder however there is no wall thickening with trace pericholecystic fluid and perihepatic fluid and gallbladder sludge. No stones are seen   - HIDA scan negative for acute cholecystitis  - LFT's trending down, continue to monitor CMP
RUQ tenderness on exam now resolved  - U/S Abdomen with mildly distended gallbladder however there is no wall thickening with trace pericholecystic fluid and perihepatic fluid and gallbladder sludge. No stones are seen   - HIDA scan negative for acute cholecystitis  - LFT's trending down, continue to monitor CMP, will instruct pt to f/u elevated LFT's with PCP

## 2018-11-28 NOTE — PHYSICAL THERAPY INITIAL EVALUATION ADULT - PATIENT/FAMILY/SIGNIFICANT OTHER GOALS STATEMENT, PT EVAL
per JUAN ANTONIO and RN Manager Sonia, pt.'s family is requesting discharge home and does not want pt. to return to rehab

## 2018-11-28 NOTE — PHYSICAL THERAPY INITIAL EVALUATION ADULT - IMPAIRMENTS CONTRIBUTING TO GAIT DEVIATIONS, PT EVAL
decreased ROM/impaired postural control/impaired motor control/narrow base of support/impaired coordination/abnormal muscle tone/impaired balance/cognition
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
## 2018-12-10 ENCOUNTER — APPOINTMENT (OUTPATIENT)
Dept: NEUROLOGY | Facility: CLINIC | Age: 38
End: 2018-12-10

## 2019-04-01 PROCEDURE — G9005: CPT

## 2019-08-14 NOTE — PATIENT PROFILE ADULT - NSPROGENDIFFINTUB_GEN_A_NUR
Problem: Mobility Impaired (Adult and Pediatric)  Goal: *Acute Goals and Plan of Care (Insert Text)  Description  FUNCTIONAL STATUS PRIOR TO ADMISSION: Patient was independent and active without use of DME.    HOME SUPPORT PRIOR TO ADMISSION: The patient lived with  and son but did not require assist.    Physical Therapy Goals  Initiated 8/14/2019  1. Patient will move from supine to sit and sit to supine  in bed with modified independence within 7 day(s). 2.  Patient will transfer from bed to chair and chair to bed with modified independence using the least restrictive device within 7 day(s). 3.  Patient will perform sit to stand with modified independence within 7 day(s). 4.  Patient will ambulate with modified independence for 150 feet with the least restrictive device within 7 day(s). 5.  Patient will ascend/descend 12 stairs with 2 handrail(s) with modified independence within 7 day(s). Outcome: Progressing Towards Goal  Note:   PHYSICAL THERAPY EVALUATION  Patient: Ryann Jewell (37 y.o. female)  Date: 8/14/2019  Primary Diagnosis: Lumbar stenosis with neurogenic claudication [M48.062]  Procedure(s) (LRB):  L4-L5 OBLIQUE LUMBAR INTERBODY FUSION, L4-S1 POSTERIOR DECOMPRESSION, L4-5 POSTERIOR LUMBAR FUSION WITH INSTRUMENTATION, ILIAC CREST BONE MARROW ASPIRATE AND OSTEOAMP FIBERS WITH IMAGE GUIDANCE (N/A) 1 Day Post-Op   Precautions:   Fall, WBAT, Back(brace with OOB)      ASSESSMENT  Based on the objective data described below, the patient presents with decreased strength, balance and ROM following admission for elective back surgery. Patient is now POD #1 L4-5 oblique and posterior fusion. Patient was educated on all back precautions, log roll and back brace usage: she verbalized understanding. Patient was able to tolerate session, blood pressure has chronic low blood pressure. She was asymptomatic with activity, and blood pressure improved with activity: see below.   Patient overall requiring RW for stability, but able to tolerate all activity without complications. .    Current Level of Function Impacting Discharge (mobility/balance): CGA-MIN A for transfers and ambulation with RW    Functional Outcome Measure: The patient scored 80/100 on the Barthel Index outcome measure . Other factors to consider for discharge: none     Patient will benefit from skilled therapy intervention to address the above noted impairments. PLAN :  Recommendations and Planned Interventions: bed mobility training, transfer training, gait training, therapeutic exercises, neuromuscular re-education and therapeutic activities      Frequency/Duration: Patient will be followed by physical therapy:  twice daily to address goals. Recommendation for discharge: (in order for the patient to meet his/her long term goals)  Physical therapy at least 2 days/week in the home     This discharge recommendation:  A follow-up discussion with the attending provider and/or case management is planned    Equipment recommendations for successful discharge (if) home: patient owns rollator, would benefit from RW- will continue to discuss with patient          SUBJECTIVE:   Patient stated Erika Lynn am glad that hernández came out.     OBJECTIVE DATA SUMMARY:   HISTORY:    Past Medical History:   Diagnosis Date    Anxiety     Atypical lobular hyperplasia of breast 2011    LEFT breast     Degenerative lumbar spinal stenosis     History of seasonal allergies     PONV (postoperative nausea and vomiting)     Pure hypercholesterolemia 3/18/2011    PVCs (premature ventricular contractions) 2009    Has resolved     Past Surgical History:   Procedure Laterality Date    HX BREAST BIOPSY Right 1996    RIGHT-Benign    HX BREAST BIOPSY Left 7/2011    LEFT-ADH    HX COLONOSCOPY N/A     HX GYN      reanastomosis of tubes    HX GYN  7/2012    Uterine ablation    HX HYSTERECTOMY N/A     HX ROTATOR CUFF REPAIR Right 2010       Personal factors and/or comorbidities impacting plan of care: see below    Home Situation  Home Environment: Private residence  # Steps to Enter: 2  Rails to Enter: No  One/Two Story Residence: Two story  # of Interior Steps: 12  Interior Rails: Both  Lift Chair Available: No  Living Alone: No  Support Systems: Spouse/Significant Other/Partner, Family member(s)  Patient Expects to be Discharged to[de-identified] Private residence  Current DME Used/Available at Home: Walker, rolling, Raised toilet seat  Tub or Shower Type: Shower    EXAMINATION/PRESENTATION/DECISION MAKING:   Vitals:    08/14/19 0335 08/14/19 0836 08/14/19 0918 08/14/19 0925   BP: 102/61 94/57 (!) 86/56 112/73   BP 1 Location: Left arm Left arm Left arm    BP Patient Position: At rest At rest At rest;Pre-activity    Pulse: (!) 51 (!) 52 (!) 51 75   Resp: 16 14     Temp: 97.4 °F (36.3 °C) 97.8 °F (36.6 °C)     SpO2: 100% 96%     Weight:       Height:           Critical Behavior:  Neurologic State: Alert  Orientation Level: Oriented X4  Cognition: Appropriate decision making, Appropriate for age attention/concentration, Appropriate safety awareness  Safety/Judgement: Awareness of environment, Good awareness of safety precautions  Hearing:   Auditory  Auditory Impairment: None  Skin:  all exposed intact, hemovac drain in place  Edema: none noted  Range Of Motion:  AROM: Within functional limits           PROM: Within functional limits           Strength:    Strength: Generally decreased, functional                    Tone & Sensation:   Tone: Normal              Sensation: Intact               Coordination:  Coordination: Within functional limits  Vision:   Tracking: Able to track stimulus in all quadrants w/o difficulty  Diplopia: No  Acuity: Within Defined Limits  Functional Mobility:  Bed Mobility:  Rolling: Minimum assistance  Supine to Sit: Minimum assistance     Scooting: Supervision  Transfers:  Sit to Stand: Contact guard assistance  Stand to Sit: Contact guard assistance        Bed to Chair: Contact guard assistance              Balance:   Sitting: Intact  Standing: Intact; With support  Ambulation/Gait Training:  Distance (ft): 45 Feet (ft)  Assistive Device: Walker, rolling;Gait belt;Brace/Splint  Ambulation - Level of Assistance: Contact guard assistance     Gait Description (WDL): Exceptions to WDL                                          Stairs: Therapeutic Exercises:       Functional Measure:  Barthel Index:    Bathin  Bladder: 10  Bowels: 10  Groomin  Dressing: 10  Feeding: 10  Mobility: 10  Stairs: 0  Toilet Use: 10  Transfer (Bed to Chair and Back): 10  Total: 80/100       Percentage of impairment   0%   1-19%   20-39%   40-59%   60-79%   80-99%   100%   Barthel Score 0-100 100 99-80 79-60 59-40 20-39 1-19   0     The Barthel ADL Index: Guidelines  1. The index should be used as a record of what a patient does, not as a record of what a patient could do. 2. The main aim is to establish degree of independence from any help, physical or verbal, however minor and for whatever reason. 3. The need for supervision renders the patient not independent. 4. A patient's performance should be established using the best available evidence. Asking the patient, friends/relatives and nurses are the usual sources, but direct observation and common sense are also important. However direct testing is not needed. 5. Usually the patient's performance over the preceding 24-48 hours is important, but occasionally longer periods will be relevant. 6. Middle categories imply that the patient supplies over 50 per cent of the effort. 7. Use of aids to be independent is allowed. Arabella Rosas., Barthel, D.W. (2929). Functional evaluation: the Barthel Index. [address] [employer] (14)2. ABELARDO Aburto, Jyotsna Rich., Bebeto Vaughn., Nic, [address] [place] [address] ().  Measuring the change indisability after inpatient rehabilitation; comparison of the responsiveness of the Barthel Index and Functional [place] Measure. Journal of Neurology, Neurosurgery, and Psychiatry, 66(4), 791-114. ADDISON Cordova, ED Tobias, & Rebecca Cano M.A. (2004.) Assessment of post-stroke quality of life in cost-effectiveness studies: The usefulness of the Barthel Index and the EuroQoL-5D. Quality of Life Research, 15, 269-68           Physical Therapy Evaluation Charge Determination   History Examination Presentation Decision-Making   HIGH Complexity :3+ comorbidities / personal factors will impact the outcome/ POC  HIGH Complexity : 4+ Standardized tests and measures addressing body structure, function, activity limitation and / or participation in recreation  LOW Complexity : Stable, uncomplicated  Other outcome measures Barthel Index  LOW       Based on the above components, the patient evaluation is determined to be of the following complexity level: LOW     Pain Rating:  None reported: patient was premedicated prior to session    Activity Tolerance:   Good  Please refer to the flowsheet for vital signs taken during this treatment. After treatment patient left in no apparent distress:   Sitting in chair, Call bell within reach and Bed / chair alarm activated    COMMUNICATION/EDUCATION:   The patients plan of care was discussed with: Occupational Therapist and Registered Nurse. Fall prevention education was provided and the patient/caregiver indicated understanding., Patient/family have participated as able in goal setting and plan of care. and Patient/family agree to work toward stated goals and plan of care.     Thank you for this referral.  Galen Young, PT, DPT   Time Calculation: 30 mins never intubated

## 2019-11-18 NOTE — PROGRESS NOTE ADULT - PROBLEM SELECTOR PLAN 6
[Anorexia] : no anorexia [FreeTextEntry1] : 60 yo woman HTN, HLD (on statin) and FHX RA here for polarthralgias/myalgias\par \par Has had joint pain of the small knuckles for years - at least 2.   It is stiff in the AM for about 30 minutes and seems to improve with movement.  developed a trigger finger, but other joints are more random.  Also gets a snapping in the neck, pain in the hips and arms hurt with supination.  Shoulder pain is with lefting and is described as dull in character.  JOints crick-crack.  there has been no swelling, heat or redness in the joints.  Does not get pain in the feet.  Has muscle pain in the upper back and shoulders which pre-date the statin and are not associated with weakness\par \par Rheum ROS\par - develop a rash - saw a dermatologist - brachial radial pruritus - - get heats with it - - uses lotion\par - has experienced dry eyes - waking up at night - ebb and flow - red - uses artificial tears. \par - allergy to dust mites\par - uses an antihistamine drop - doesn’t use it often - prn - uses it twice in the past two weeks so doesn’t feel like that has resulted in the dry eye\par - dry mouth - tries to hydrate - \par - some of the pain is more muscular in nature. \par - more fatigued easily \par - hasn’t had xrays- \par - has taken cyclobenzaprine in the past for the neck - which has helped\par - not always restorative sleep - falls asleep quickly - can awaken feeling a little sore - can occasional night sweats - tired during the day - lack of energy after work - snores at night\par  [Malaise] : no malaise [Weight Loss] : no weight loss [Fever] : no fever [Malar Facial Rash] : no malar facial rash [Chills] : no chills [Oral Ulcers] : no oral ulcers [Skin Lesions] : skin lesions [Cough] : no cough [Dysphagia] : no dysphagia [Dry Mouth] : dry mouth [Shortness of Breath] : no shortness of breath - Lovenox SQ dvt ppx  - Regular diet [Arthralgias] : arthralgias [Joint Warmth] : no joint warmth [Joint Swelling] : no joint swelling [Difficulty Standing] : no difficulty standing [Difficulty Walking] : no difficulty walking [Muscle Weakness] : no muscle weakness [Eye Pain] : no eye pain [Myalgias] : myalgias [Dry Eyes] : dry eyes [de-identified] : - more musclur pain - difficult to lift up\par - more fatigued easily \par - hasn’t had xrays- \par - just started started atorvastain for not worse\par - nicole beem pm wilbur;pbemza[sally\par - not always restorative sleep - falls asleep quickly - can awaken feleling a little sore - can occaional night sweats - tired during the day - lack of enery after work - snores at night\par \par -

## 2020-01-01 NOTE — PATIENT PROFILE ADULT - COMPLETE THE FOLLOWING IF THE PATIENT REFUSES THE INFLUENZA VACCINE:
Risks/benefits discussed with patient/surrogate/Vaccine Information Sheet (VIS) provided-VIS date: 8/07/15
2020

## 2020-02-13 NOTE — DISCHARGE NOTE ADULT - DISCHARGE TO
Thank you for coming to Urgent Care today. It was a pleasure caring for you!  Anastasiia Ivy, APNP     -Take diclofenac twice daily with food. No other anti-inflammatory medicines (advil, ibuprofen, motrin, aleve, naproxen, aspirin).  -Can take tylenol  -Flexeril as needed for muscle spasm. Make may you sleepy.   -Consider heating pad, TENs unit, and/or topical icy hot/salonpas  -Consider back support brace if lifting  -Follow up with your regular doctor for any new or worsening concerns or persistent pain.  -Go to the emergency room for any numbness or weakness or loss of bowel/bladder control     Patient Education     Back Pain (Acute or Chronic)    Back pain is one of the most common problems. The good news is that most people feel better in 1 to 2 weeks, and most of the rest in 1 to 2 months. Most people can remain active.  People who have pain describe it differently--not everyone is the same.  · The pain can be sharp, stabbing, shooting, aching, cramping or burning.  · Movement, standing, bending, lifting, sitting, or walking may worsen pain.  · It can be localized to one spot or area, or it can be more generalized.  · It can spread or radiate upwards, to the front, or go down your arms or legs (sciatica).  · It can cause muscle spasm.  Most of the time, mechanical problems with the muscles or spine cause the pain. Mechanical problems are usually caused by an injury to the muscles or ligaments. While illness can cause back pain, it is usually not caused by a serious illness. Mechanical problems include:   · Physical activity such as sports, exercise, work, or normal activity  · Overexertion, lifting, pushing, pulling incorrectly or too aggressively  · Sudden twisting, bending, or stretching from an accident, or accidental movement  · Poor posture  · Stretching or moving wrong, without noticing pain at the time  · Poor coordination, lack of regular exercise (check with your doctor about this)  · Spinal disc  disease or arthritis  · Stress  Pain can also be related to pregnancy, or illness like appendicitis, bladder or kidney infections, pelvic infections, and many other things.  Acute back pain usually gets better in 1 to 2 weeks. Back pain related to disk disease, arthritis in the spinal joints or spinal stenosis (narrowing of the spinal canal) can become chronic and last for months or years.  Unless you had a physical injury (for example, a car accident or fall) X-rays are usually not needed for the initial evaluation of back pain. If pain continues and does not respond to medical treatment, X-rays and other tests may be needed.  Home care  Try these home care recommendations:  · When in bed, try to find a position of comfort. A firm mattress is best. Try lying flat on your back with pillows under your knees. You can also try lying on your side with your knees bent up towards your chest and a pillow between your knees.  · At first, do not try to stretch out the sore spots. If there is a strain, it is not like the good soreness you get after exercising without an injury. In this case, stretching may make it worse.  · Don't sit for long periods, as in a long car ride or during other travel. This puts more stress on the lower back than standing or walking.  · During the first 24 to 72 hours after an acute injury or flare up of chronic back pain, apply an ice pack to the painful area for 20 minutes and then remove it for 20 minutes. Do this over a period of 60 to 90 minutes or several times a day. This will reduce swelling and pain. Wrap the ice pack in a thin towel or plastic to protect your skin.  · You can start with ice, then switch to heat. Heat (hot shower, hot bath, or heating pad) reduces pain and works well for muscle spasms. Heat can be applied to the painful area for 20 minutes then remove it for 20 minutes. Do this over a period of 60 to 90 minutes or several times a day. Do not sleep on a heating pad. It can  lead to skin burns or tissue damage.  · You can alternate ice and heat therapy. Talk with your doctor about the best treatment for your back pain.  · Therapeutic massage can help relax the back muscles without stretching them.  · Be aware of safe lifting methods and do not lift anything without stretching first.  Medicines  Talk to your doctor before using medicine, especially if you have other medical problems or are taking other medicines.  · You may use over-the-counter medicine as directed on the bottle to control pain, unless another pain medicine was prescribed. If you have chronic conditions like diabetes, liver or kidney disease, stomach ulcers, or gastrointestinal bleeding, or are taking blood thinners, talk to your doctor before taking any medicine.  · Be careful if you are given a prescription medicines, narcotics, or medicine for muscle spasms. They can cause drowsiness, affect your coordination, reflexes, and judgement. Do not drive or operate heavy machinery.  Follow-up care  Follow up with your healthcare provider, or as advised.   A radiologist will review any X-rays that were taken. Your provide will notify you of any new findings that may affect your care.  Call 911  Call 911 if any of the following occur:  · Trouble breathing  · Confusion  · Very drowsy or trouble awakening  · Fainting or loss of consciousness  · Rapid or very slow heart rate  · Loss of bowel or bladder control  When to seek medical advice  Call your healthcare provider right away if any of these occur:   · Pain becomes worse or spreads to your legs  · Weakness or numbness in one or both legs  · Numbness in the groin or genital area  Date Last Reviewed: 7/1/2016  © 7830-0507 [employer], [employer]. [address] [place] [address], [place] [address]. All rights reserved. This information is not intended as a substitute for professional medical care. Always follow your healthcare professional's instructions.            Home

## 2020-02-19 NOTE — PROGRESS NOTE ADULT - PROBLEM/PLAN-4
DISPLAY PLAN FREE TEXT
General

## 2020-06-01 NOTE — ED ADULT TRIAGE NOTE - PAIN RATING/NUMBER SCALE (0-10): REST
Past Medical History:   Diagnosis Date    *Atrial fibrillation     Anemia due to blood loss, chronic 10/15/2013    Anxiety     Bilateral carotid artery disease 2016    CHF (congestive heart failure)     Depression     Hyperlipidemia     Hypertension     Prostate hypertrophy     Sleep apnea 2013    Stroke     Venous insufficiency 2013       Past Surgical History:   Procedure Laterality Date    APPENDECTOMY      CATARACT EXTRACTION, BILATERAL  2013    CHOLECYSTECTOMY      COLON SURGERY      diverticulitis    EYE SURGERY  2013    cataract    HEMORRHOID SURGERY      JOINT REPLACEMENT      left hip    WOUND DEBRIDEMENT         Review of patient's allergies indicates:   Allergen Reactions    No known allergies        No current facility-administered medications on file prior to encounter.      Current Outpatient Medications on File Prior to Encounter   Medication Sig    aspirin (ECOTRIN) 81 MG EC tablet Take 1 tablet (81 mg total) by mouth once daily.    diltiaZEM (CARDIZEM CD) 120 MG Cp24 Take 1 capsule (120 mg total) by mouth once daily.    furosemide (LASIX) 40 MG tablet TAKE 1 TABLET(40 MG) BY MOUTH DAILY AS NEEDED    naproxen sodium (ANAPROX) 220 MG tablet Take 220 mg by mouth 2 (two) times daily as needed.    oxybutynin (DITROPAN-XL) 10 MG 24 hr tablet Take 1 tablet (10 mg total) by mouth once daily.    LORazepam (ATIVAN) 1 MG tablet Take 1 tablet (1 mg total) by mouth 2 (two) times daily as needed for Anxiety.     Family History     Problem Relation (Age of Onset)    Diabetes Mother, Sister, Sister, Sister    Heart disease Mother    Hypertension Brother        Tobacco Use    Smoking status: Former Smoker     Packs/day: 0.50     Years: 20.00     Pack years: 10.00     Types: Cigarettes     Last attempt to quit: 1998     Years since quittin.4    Smokeless tobacco: Never Used   Substance and Sexual Activity    Alcohol use: Not Currently     Comment: social use  of alcohol    Drug use: No    Sexual activity: Not Currently     Birth control/protection: None     Review of Systems   Constitution: Positive for malaise/fatigue.   HENT: Negative for hearing loss and hoarse voice.    Eyes: Negative for blurred vision and visual disturbance.   Cardiovascular: Positive for dyspnea on exertion and irregular heartbeat. Negative for chest pain, claudication, leg swelling, near-syncope, orthopnea, palpitations, paroxysmal nocturnal dyspnea and syncope.   Respiratory: Negative for cough, hemoptysis, shortness of breath, sleep disturbances due to breathing, snoring and wheezing.    Endocrine: Negative for cold intolerance and heat intolerance.   Hematologic/Lymphatic: Bruises/bleeds easily (on eliquis).   Skin: Positive for poor wound healing. Negative for color change, dry skin and nail changes.   Musculoskeletal: Positive for arthritis, back pain and joint pain. Negative for myalgias.        Wheelchair bound   Gastrointestinal: Negative for bloating, abdominal pain, constipation, nausea and vomiting.   Genitourinary: Negative for dysuria, flank pain, hematuria and hesitancy.   Neurological: Positive for weakness. Negative for headaches, light-headedness, loss of balance, numbness and paresthesias.   Psychiatric/Behavioral: Negative for altered mental status.   Allergic/Immunologic: Negative for environmental allergies.     Objective:     Vital Signs (Most Recent):  Temp: 98.3 °F (36.8 °C) (06/01/20 1134)  Pulse: 94 (06/01/20 1307)  Resp: 18 (06/01/20 1134)  BP: (!) 156/67 (06/01/20 1134)  SpO2: (!) 93 % (06/01/20 1134) Vital Signs (24h Range):  Temp:  [97.8 °F (36.6 °C)-98.3 °F (36.8 °C)] 98.3 °F (36.8 °C)  Pulse:  [] 94  Resp:  [18] 18  SpO2:  [93 %-98 %] 93 %  BP: (116-156)/(64-70) 156/67     Weight: 93.1 kg (205 lb 4 oz)  Body mass index is 30.75 kg/m².    SpO2: (!) 93 %  O2 Device (Oxygen Therapy): room air      Intake/Output Summary (Last 24 hours) at 6/1/2020 1349  Last  data filed at 6/1/2020 1100  Gross per 24 hour   Intake 1208 ml   Output 800 ml   Net 408 ml       Lines/Drains/Airways     Peripheral Intravenous Line                 Peripheral IV - Single Lumen 05/30/20 1420 20 G Right Forearm 1 day         Peripheral IV - Single Lumen 05/30/20 1716 20 G Right Antecubital 1 day                Physical Exam   Constitutional: He is oriented to person, place, and time. He appears well-developed and well-nourished. No distress.   HENT:   Head: Normocephalic and atraumatic.   Eyes: Pupils are equal, round, and reactive to light.   Neck: Normal range of motion and full passive range of motion without pain. Neck supple. No JVD present.   Cardiovascular: S1 normal, S2 normal and intact distal pulses. An irregularly irregular rhythm present. Tachycardia present. PMI is not displaced. Exam reveals no distant heart sounds.   No murmur heard.  Pulses:       Radial pulses are 2+ on the right side, and 2+ on the left side.        Dorsalis pedis pulses are 2+ on the right side, and 2+ on the left side.   Remains in AFIB    Pulmonary/Chest: Effort normal. No accessory muscle usage. No respiratory distress. He has no decreased breath sounds. He has no wheezes. He has no rales.   Abdominal: Soft. Bowel sounds are normal. He exhibits no distension. There is no tenderness.   Musculoskeletal: Normal range of motion. He exhibits edema (trace LE).        Right ankle: He exhibits swelling.        Left ankle: He exhibits swelling.   Trace BLE edema   Neurological: He is alert and oriented to person, place, and time.   Skin: Skin is warm and dry. He is not diaphoretic. No cyanosis. Nails show no clubbing.   Psychiatric: He has a normal mood and affect. His speech is normal and behavior is normal. Judgment and thought content normal. Cognition and memory are normal.   Nursing note and vitals reviewed.      Significant Labs:   BMP:   Recent Labs   Lab 05/31/20  0445 06/01/20  0506   GLU 93 91   * 136    K 4.0 3.7    103   CO2 27 27   BUN 14 12   CREATININE 0.8 0.9   CALCIUM 7.6* 7.7*   MG 2.1  --    , CBC   Recent Labs   Lab 05/31/20  0445 06/01/20  0506   WBC 2.87*  2.87* 2.51*   HGB 7.2*  7.2* 8.8*   HCT 23.5*  23.5* 27.9*     182 182    and All pertinent lab results from the last 24 hours have been reviewed.    Significant Imaging: Echocardiogram:   2D echo with color flow doppler:   Results for orders placed or performed during the hospital encounter of 01/11/20   2D echo with color flow doppler   Result Value Ref Range    QEF 60 55 - 65    Diastolic Dysfunction Yes (A)     Est. PA Systolic Pressure 26.42     Narrative    Date of Procedure: 01/12/2020        TEST DESCRIPTION       Aorta: The aortic root is normal in size, measuring 2.2 cm at sinotubular junction and 3.3 cm at Sinuses of Valsalva.     Left Atrium: The left atrial volume index is severely enlarged, measuring 53.06 cc/m2.     Left Ventricle: The left ventricle is normal in size, with an end-diastolic diameter of 4.2 cm, and an end-systolic diameter of 2.7 cm. LV wall thickness is normal, with the septum measuring 1.4 cm and the posterior wall measuring 1.1 cm across. Relative   wall thickness was increased at 0.52, and the LV mass index was 105.3 g/m2 consistent with concentric remodeling. There are no regional wall motion abnormalities. Left ventricular systolic function appears normal. Visually estimated ejection fraction is   60-65%. The LV Doppler derived stroke volume equals 49.0 ccs.     Diastolic indices: E wave velocity 0.8 m/s, E/A ratio 2.1,  msec., E/e' ratio(avg) 10. There is diastolic dysfunction secondary to relaxation abnormality.     Right Atrium: The right atrium is normal in size, measuring 5.8 cm in length and 3.7 cm in width in the apical view.     Right Ventricle: The right ventricle is normal in size. Global right ventricular systolic function appears normal. Tricuspid annular plane systolic excursion  (TAPSE) is .8 cm. The estimated PA systolic pressure is greater than 26 mmHg.     Aortic Valve:  The mean gradient obtained across the aortic valve is 9 mmHg.     Mitral Valve:  The pressure half time is 52 msec. The calculated mitral valve area is 4.23 cm2.     Intracavitary: There is no evidence of pericardial effusion, intracavity mass, thrombi, or vegetation.         CONCLUSIONS     1 - Severe left atrial enlargement.     2 - Concentric remodeling.     3 - No wall motion abnormalities.     4 - Normal left ventricular systolic function (EF 60-65%).     5 - Impaired LV relaxation, normal LAP (grade 1 diastolic dysfunction).     6 - Normal right ventricular systolic function .     7 - The estimated PA systolic pressure is greater than 26 mmHg.             This document has been electronically    SIGNED BY: Johnson San MD On: 01/12/2020 12:09    and Transthoracic echo (TTE) complete (Cupid Only): No results found for this or any previous visit. and X-Ray: CXR: X-Ray Chest 1 View (CXR): No results found for this visit on 05/30/20.   5

## 2021-05-10 NOTE — DIETITIAN INITIAL EVALUATION ADULT. - PROBLEM SELECTOR PLAN 3
Quality 226: Preventive Care And Screening: Tobacco Use: Screening And Cessation Intervention: Patient screened for tobacco use and is an ex/non-smoker Detail Level: Detailed Quality 130: Documentation Of Current Medications In The Medical Record: Current Medications Documented Quality 110: Preventive Care And Screening: Influenza Immunization: Influenza Immunization Administered during Influenza season - Mild, likely 2/2 SIADH vs. dehydration.  - Recheck lytes  - Improving with IVF. Will encourage po intake.

## 2021-06-18 NOTE — H&P ADULT - NSHPPHYSICALEXAM_GEN_ALL_CORE
T(C): 36.8 (11-03-18 @ 20:28), Max: 37.1 (11-03-18 @ 12:04)  HR: 68 (11-03-18 @ 20:28) (66 - 84)  BP: 124/84 (11-03-18 @ 20:28) (122/80 - 145/61)  RR: 18 (11-03-18 @ 20:28) (16 - 18)  SpO2: 100% (11-03-18 @ 20:28) (100% - 100%)  Wt(kg): --  GENERAL: NAD, well-developed  HEAD:  Atraumatic, Normocephalic. No open wound on head.  EYES: EOMI, PERRLA, conjunctiva and sclera clear  NECK: Supple, No JVD  CHEST/LUNG: Clear to auscultation bilaterally; No wheeze  HEART: Regular rate and rhythm; No murmurs, rubs, or gallops  ABDOMEN: Soft, Nontender, Nondistended; Bowel sounds present  EXTREMITIES:  2+ Peripheral Pulses, No clubbing, cyanosis, or edema  PSYCH: AAOx3, minimally verbal.  NEUROLOGY: right sided weakness.  SKIN: No rashes or lesions Left arm;
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
## 2022-02-09 NOTE — H&P ADULT - ATTENDING COMMENTS
Oculoplastic Surgeon Procedure Text (A): After obtaining clear surgical margins the patient was sent to oculoplastics for surgical repair.  The patient understands they will receive post-surgical care and follow-up from the referring physician's office. Pt seen and examined at bedside. Agree with resident H&P and plan as edited above.

## 2023-04-26 NOTE — CHART NOTE - NSCHARTNOTESELECT_GEN_ALL_CORE
CC:  Latrell Pa is here today for Establish Care and Physical       Medications have been reviewed and updated    Patient preferred phone number: [phone]  Ok to leave detailed message on [employer] and Prefers communication and results via [employer] Due   Topic Date Due   • COVID-19 Vaccine (1) Never done   • Pneumococcal Vaccine 0-64 (1 - PCV) Never done       Patient is due for topics as listed above but is not proceeding with Immunization(s) COVID-19 and Pneumococcal at this time.      Event Note/R3 Up date note

## 2024-07-15 NOTE — ED PROVIDER NOTE - NS ED ATTENDING STATEMENT MOD
00:00 I have personally performed a face to face diagnostic evaluation on this patient. I have reviewed the ACP note and agree with the history, exam and plan of care, except as noted.

## 2024-12-16 NOTE — H&P ADULT - PROBLEM SELECTOR PLAN 1
To Dr. Castillo, please advise  Last seen 10-9-24  Ozempic 2 mg currently   recorded fever of 101.9 at NH w/ hypotension, patient found to be hypotensive here as well, CT abd w/ proctocolitis, UA positive w/ suprapubic tenderness. S/p IVF, cultures, cipro/flagyl, c/w IVF, Abx, f/u cultures